# Patient Record
Sex: MALE | Race: WHITE | NOT HISPANIC OR LATINO | Employment: OTHER | URBAN - METROPOLITAN AREA
[De-identification: names, ages, dates, MRNs, and addresses within clinical notes are randomized per-mention and may not be internally consistent; named-entity substitution may affect disease eponyms.]

---

## 2017-04-26 ENCOUNTER — OFFICE VISIT (OUTPATIENT)
Dept: SURGERY | Facility: CLINIC | Age: 62
End: 2017-04-26

## 2017-04-26 VITALS — WEIGHT: 158 LBS | BODY MASS INDEX: 23.95 KG/M2 | HEIGHT: 68 IN

## 2017-04-26 DIAGNOSIS — R10.31 RIGHT LOWER QUADRANT ABDOMINAL PAIN: Primary | ICD-10-CM

## 2017-04-26 PROCEDURE — 99203 OFFICE O/P NEW LOW 30 MIN: CPT | Performed by: SURGERY

## 2017-04-26 RX ORDER — LIOTHYRONINE SODIUM 5 UG/1
5 TABLET ORAL
COMMUNITY

## 2017-04-26 RX ORDER — ACETAMINOPHEN 160 MG
2000 TABLET,DISINTEGRATING ORAL DAILY
COMMUNITY
End: 2022-08-24

## 2017-04-27 NOTE — PROGRESS NOTES
Date of Service: 4/27/2017    Chief Complaint   Patient presents with   • Hernia     [facility]       Subjective: Kenneth Jean Baptiste is a 61 y.o. male who is referred to my clinic by Gerard Miller* for Right hip pain.  The patient has chronic pain issues about one week ago he developed sudden onset of right buttock and hip pain.  The pain was dull and burning in nature and radiates to the back right leg, lower abdomen and scrotum.  The rated the pain is 9 out of 10.  He will come and go.  We will get better with passing gas and belching and food intake would make the pain worse.  There is no specific food that triggers the pain. He reports nausea but no episodes of vomiting.  He has not been eating well due to concern of worsening pain.  The pain is now resolved.  He has history of chronic constipation and is being followed by Dr. Aragon.  He also reports some intermittent perirectal pain. He is taking linzess.  He reports having one bowel movement every 1 or 2 days.  The last bowel movements were hard and small.  He reports passing gas.  He usually takes.  Soft gels and prune juice for constipation but this time he hasn't been working.  He denies any fevers and chills.  He denies any pain with bowel movements.  He had a CT scan of the abdomen on Thursday that although I don't have the images the reports show only findings consistent on file small containing fat inguinal hernia.  There is no other intra-abdominal pathology.  He has history of multiple herniated this in his back and has never had surgery by physical therapy.  His last colonoscopy was in 2013 when he was found to have diverticulosis but no polyps.  He denies any other complaint    Past Medical History:   Diagnosis Date   • Arthritis    • Diverticulitis    • Diverticulosis    • GERD (gastroesophageal reflux disease)    • Gout    • Hypertension    • Thyroid disease        Past Surgical History:   Procedure Laterality Date   • APPENDECTOMY  03/2013     Dr. Granado   • BICEPS TENDON REPAIR  08/28/2012   • INGUINAL HERNIA REPAIR Right 2000    Dr. Mckenna   • JOINT REPLACEMENT Left     hip repair 2012, replaced in 1980's   • THYROIDECTOMY, PARTIAL  06/08/2016       Current Outpatient Prescriptions on File Prior to Visit   Medication Sig Dispense Refill   • acetaminophen (TYLENOL) 325 MG tablet Take 2 tablets by mouth Every 6 (Six) Hours As Needed for mild pain (1-3).  0   • allopurinol (ZYLOPRIM) 100 MG tablet TK 1 T PO Daily  1   • ALPRAZolam (XANAX) 0.5 MG tablet Take 1 tablet by mouth At Night As Needed.     • Aspirin-Acetaminophen-Caffeine (EXCEDRIN PO) Take 1 tablet by mouth As Needed.     • lansoprazole (PREVACID) 15 MG capsule Take 20 mg by mouth Daily.     • levothyroxine (SYNTHROID, LEVOTHROID) 100 MCG tablet Take 1 tablet by mouth Daily. Wait to start until stress test has been done and is ok. 30 tablet 0   • Linaclotide 145 MCG capsule Take 1 capsule by mouth as needed.     • metoprolol succinate XL (TOPROL-XL) 25 MG 24 hr tablet Take 1 tablet by mouth Daily. 30 tablet 0   • naproxen sodium (ALEVE) 220 MG tablet Take 220 mg by mouth 2 (two) times a day as needed for mild pain (1-3).     • traMADol (ULTRAM) 50 MG tablet Take 50 mg by mouth every 6 (six) hours as needed for moderate pain (4-6).       No current facility-administered medications on file prior to visit.        Allergies   Allergen Reactions   • Sulfa Antibiotics Anaphylaxis   • Ciprofloxacin Other (See Comments)     Causes thrush   • Citalopram      PT STATED HIS HEAD HURT WHEN TAKING CELEXA    • Codeine Nausea Only   • Meperidine Nausea Only   • Sertraline Hcl      UNPLEASANT FEELING AND NAUSEA       Social History     Social History   • Marital status:      Spouse name: N/A   • Number of children: N/A   • Years of education: N/A     Occupational History   • Not on file.     Social History Main Topics   • Smoking status: Never Smoker   • Smokeless tobacco: Never Used   • Alcohol use  "Yes      Comment: 1/day   • Drug use: No   • Sexual activity: Defer     Other Topics Concern   • Not on file     Social History Narrative       Family History   Problem Relation Age of Onset   • Cancer Mother    • Stroke Father    • Heart disease Father    • Cancer Sister    • No Known Problems Daughter    • Graves' disease Son    • Cancer Maternal Aunt        REVIEW OF SYSTEMS    CONSTITUTIONAL: Reports activity change, appetite change, chills, fatigue.  Denies fever  HEENT: Reports tinnitus, denies congestion.  RESPIRATORY: Denies chronic cough or sob.  CARDIAC: Denies chest pain, palpitations, edema  GI: Reports abdominal pain, constipation, rectal pain   : Denies dysuria or hematuria.. Reports pelvic and flank pain  MUSCULOSKELETAL: Reports joint and back and neck pain  NEURO: Reports chronic headaches denies tremor  ENDOCRINE: Denies significant heat or cold intolerance or history of thyroid problems.    DERM: no rashes,lesions or discharge.     Physical Examination  Ht 68\" (172.7 cm)  Wt 158 lb (71.7 kg)  BMI 24.02 kg/m2  Body mass index is 24.02 kg/(m^2).  GENERAL:alert, well appearing, and in no distress and oriented to person, place, and time  HEENT: normochephalic, atraumatic, no scleral icterus moist mucous membranes.  NECK: Supple there is no thyromegaly or lymphadenopathy  CHEST: clear to auscultation, no wheezes, rales or rhonchi, symmetric air entry  CARDIAC: regular rate and rhythm    ABDOMEN: soft, tender at right flank and RLQ, no rebound or guarding, no peritoneal signs.  nondistended, no masses or organomegaly. Small easy reducible inguinal hernia.  The right superior gluteal area is tender to palpation just above of the takeoff of the sciatic nerve.  There is no masses or bulges.  There is no evidence of lumbar or flank hernias.  He has mild pain at his right hip with leg elevation.   EXTREMITIES: no cyanosis, clubbing or edema  NEURO: alert and oriented, normal speech, cranial nerves 2-12 " grossly intact, no focal deficits   SKIN: Moist, warm, no rashes.  Rectal exam: Perianal examination revealed no evidence of hemorrhoids.  Digital rectal examination show a tonic anal sphincter, he has an enlarged prostate without any obvious nodularities.  There is no masses in the rectal vault.  The examination is negative for blood.    Radiographic Studies:  CT A/P(4/20/17): (Report- No imaging available) Small fat containing inguinal hernia, diverticulosis without diverticulitis, splenomegaly.     Labs(4/18/17):   WBC 4.8 Hgb 5.7  Lipase, LFT normal  No other lab abnormalities    Assessment:   Kenneth Jean Baptiste is a 61 y.o. male who presents with right buttocks pain that radiates to the right lower quadrant and groin and right leg as well as lower back.  His physical exam does not show any signs of intra-abdominal pathology.  He has a small easy reducible inguinal hernia that would not cause the pain the patient is experiencing.  He has chronic right lower quadrant pain and had several studies done.  He has history of chronic constipation and has been treated by Dr. Aragon.  I do not think the patient has any signs or symptoms of bowel obstruction.  He has pain and feels nauseous after food intake that can be attributed to gallbladder disease.  I don't think his pain is related to any intra-abdominal pathology.  He has multiple hernia disease and the type of pain and distribution is more consistent with this.  I do not think the constipation will cause his right hip pain.    Plan:      - I will order a HIDA scan to rule out gallbladder dyskinesia, the next step would be to perform an upper endoscopy to rule out peptic ulcer disease.  If negative will need to have spine x-rays order.  - He has small inguinal hernia that is not the cause of his symptoms and can be fixed electively if needed after his acute issues are resolved  - Bowel regimen as per Dr. Aragon.  He can use OTC mg citrate.    I discussed my  findings with the patient.  He verbalized understanding and agree with    Zbigniew Starr MD  General, Minimally Invasive and Endoscopic Surgery  [facility]    [address], [address]  [place], [place], [address]  P: [phone]  F: [phone]

## 2017-05-03 ENCOUNTER — APPOINTMENT (OUTPATIENT)
Dept: NUCLEAR MEDICINE | Facility: HOSPITAL | Age: 62
End: 2017-05-03
Attending: SURGERY

## 2017-05-09 ENCOUNTER — OFFICE (AMBULATORY)
Dept: URBAN - METROPOLITAN AREA CLINIC 75 | Facility: CLINIC | Age: 62
End: 2017-05-09

## 2017-05-09 VITALS
HEIGHT: 68 IN | WEIGHT: 157 LBS | HEART RATE: 73 BPM | SYSTOLIC BLOOD PRESSURE: 122 MMHG | DIASTOLIC BLOOD PRESSURE: 72 MMHG

## 2017-05-09 DIAGNOSIS — K59.09 OTHER CONSTIPATION: ICD-10-CM

## 2017-05-09 DIAGNOSIS — R10.30 LOWER ABDOMINAL PAIN, UNSPECIFIED: ICD-10-CM

## 2017-05-09 DIAGNOSIS — R14.3 FLATULENCE: ICD-10-CM

## 2017-05-09 DIAGNOSIS — R19.4 CHANGE IN BOWEL HABIT: ICD-10-CM

## 2017-05-09 DIAGNOSIS — Z80.9 FAMILY HISTORY OF MALIGNANT NEOPLASM, UNSPECIFIED: ICD-10-CM

## 2017-05-09 PROCEDURE — 99214 OFFICE O/P EST MOD 30 MIN: CPT

## 2017-05-09 RX ORDER — LINACLOTIDE 290 UG/1
290 CAPSULE, GELATIN COATED ORAL
Qty: 90 | Refills: 3 | Status: COMPLETED
Start: 2017-05-09 | End: 2021-01-26

## 2017-05-09 RX ORDER — LINACLOTIDE 145 UG/1
145 CAPSULE, GELATIN COATED ORAL
Qty: 30 | Refills: 0 | Status: COMPLETED
End: 2017-05-09

## 2017-05-15 ENCOUNTER — APPOINTMENT (OUTPATIENT)
Dept: NUCLEAR MEDICINE | Facility: HOSPITAL | Age: 62
End: 2017-05-15
Attending: SURGERY

## 2017-06-20 VITALS
RESPIRATION RATE: 14 BRPM | WEIGHT: 148 LBS | HEART RATE: 65 BPM | SYSTOLIC BLOOD PRESSURE: 119 MMHG | DIASTOLIC BLOOD PRESSURE: 79 MMHG | HEART RATE: 70 BPM | HEART RATE: 72 BPM | RESPIRATION RATE: 19 BRPM | DIASTOLIC BLOOD PRESSURE: 49 MMHG | HEIGHT: 68 IN | TEMPERATURE: 97.3 F | SYSTOLIC BLOOD PRESSURE: 107 MMHG | DIASTOLIC BLOOD PRESSURE: 72 MMHG | HEART RATE: 78 BPM | RESPIRATION RATE: 16 BRPM | OXYGEN SATURATION: 100 % | DIASTOLIC BLOOD PRESSURE: 68 MMHG | SYSTOLIC BLOOD PRESSURE: 98 MMHG | RESPIRATION RATE: 18 BRPM | SYSTOLIC BLOOD PRESSURE: 116 MMHG | DIASTOLIC BLOOD PRESSURE: 64 MMHG | TEMPERATURE: 97.5 F | OXYGEN SATURATION: 97 % | DIASTOLIC BLOOD PRESSURE: 75 MMHG | SYSTOLIC BLOOD PRESSURE: 102 MMHG | HEART RATE: 76 BPM | RESPIRATION RATE: 10 BRPM | SYSTOLIC BLOOD PRESSURE: 95 MMHG | SYSTOLIC BLOOD PRESSURE: 123 MMHG | HEART RATE: 67 BPM | HEART RATE: 71 BPM | SYSTOLIC BLOOD PRESSURE: 101 MMHG | SYSTOLIC BLOOD PRESSURE: 109 MMHG | DIASTOLIC BLOOD PRESSURE: 66 MMHG | OXYGEN SATURATION: 99 % | OXYGEN SATURATION: 98 % | DIASTOLIC BLOOD PRESSURE: 77 MMHG | HEART RATE: 74 BPM

## 2017-06-21 ENCOUNTER — AMBULATORY SURGICAL CENTER (AMBULATORY)
Dept: URBAN - METROPOLITAN AREA SURGERY 17 | Facility: SURGERY | Age: 62
End: 2017-06-21

## 2017-06-21 ENCOUNTER — OFFICE (AMBULATORY)
Dept: URBAN - METROPOLITAN AREA CLINIC 64 | Facility: CLINIC | Age: 62
End: 2017-06-21

## 2017-06-21 DIAGNOSIS — D12.2 BENIGN NEOPLASM OF ASCENDING COLON: ICD-10-CM

## 2017-06-21 DIAGNOSIS — K57.30 DIVERTICULOSIS OF LARGE INTESTINE WITHOUT PERFORATION OR ABS: ICD-10-CM

## 2017-06-21 DIAGNOSIS — R10.30 LOWER ABDOMINAL PAIN, UNSPECIFIED: ICD-10-CM

## 2017-06-21 DIAGNOSIS — R19.4 CHANGE IN BOWEL HABIT: ICD-10-CM

## 2017-06-21 LAB
GI HISTOLOGY: A. UNSPECIFIED: (no result)
GI HISTOLOGY: PDF REPORT: (no result)

## 2017-06-21 PROCEDURE — 45385 COLONOSCOPY W/LESION REMOVAL: CPT | Performed by: INTERNAL MEDICINE

## 2017-06-21 PROCEDURE — 88305 TISSUE EXAM BY PATHOLOGIST: CPT | Performed by: INTERNAL MEDICINE

## 2017-06-21 RX ADMIN — PROPOFOL 50 MG: 10 INJECTION, EMULSION INTRAVENOUS at 09:59

## 2017-06-21 RX ADMIN — PROPOFOL 100 MG: 10 INJECTION, EMULSION INTRAVENOUS at 09:52

## 2017-06-21 RX ADMIN — PROPOFOL 50 MG: 10 INJECTION, EMULSION INTRAVENOUS at 09:56

## 2017-06-21 RX ADMIN — PROPOFOL 50 MG: 10 INJECTION, EMULSION INTRAVENOUS at 09:52

## 2017-06-21 NOTE — SERVICENOTES
Due hip replacement and wonders if this could contribute to abdominal pain. Gluten free diet has helped.

## 2017-06-21 NOTE — SERVICEHPINOTES
Patient presents with c/o lower abdominal pain radiating to low back, bloating, constipation x 1 month. He had been taking Linzess 145mg PRN which was working until this recent complaints. He did have a CT scan which suggested a Right Inguinal Hernia. He saw a surgeon who did not feel like this is the problem. He finally took Mg Citrate which relieved the pain, bloating, constipation. He did go gluten free about 10 days ago which has seemed to help his symptoms. He continues to have lower abdominal pain and tenderness. His pain is worsened by foods. It did not seem to correlate with any food types. Last CN was in 2013 which was normal. He is due every 5 years due to FHCC and Appendicular cancer.

## 2017-12-19 PROCEDURE — 99283 EMERGENCY DEPT VISIT LOW MDM: CPT

## 2017-12-20 ENCOUNTER — HOSPITAL ENCOUNTER (EMERGENCY)
Facility: HOSPITAL | Age: 62
Discharge: HOME | End: 2017-12-20
Attending: EMERGENCY MEDICINE | Admitting: EMERGENCY MEDICINE

## 2017-12-20 VITALS
TEMPERATURE: 98 F | DIASTOLIC BLOOD PRESSURE: 84 MMHG | BODY MASS INDEX: 22.43 KG/M2 | WEIGHT: 148 LBS | RESPIRATION RATE: 17 BRPM | HEIGHT: 68 IN | SYSTOLIC BLOOD PRESSURE: 140 MMHG | OXYGEN SATURATION: 100 % | HEART RATE: 84 BPM

## 2017-12-20 DIAGNOSIS — M25.562 ACUTE PAIN OF LEFT KNEE: Primary | ICD-10-CM

## 2017-12-20 DIAGNOSIS — M71.22 SYNOVIAL CYST OF LEFT POPLITEAL SPACE: ICD-10-CM

## 2017-12-20 LAB
ALBUMIN SERPL-MCNC: 4.5 G/DL (ref 3.5–5.2)
ALBUMIN/GLOB SERPL: 1.4 G/DL
ALP SERPL-CCNC: 65 U/L (ref 39–117)
ALT SERPL W P-5'-P-CCNC: 9 U/L (ref 1–41)
ANION GAP SERPL CALCULATED.3IONS-SCNC: 12.4 MMOL/L
AST SERPL-CCNC: 15 U/L (ref 1–40)
BASOPHILS # BLD AUTO: 0.04 10*3/MM3 (ref 0–0.2)
BASOPHILS NFR BLD AUTO: 0.6 % (ref 0–1.5)
BILIRUB SERPL-MCNC: 0.4 MG/DL (ref 0.1–1.2)
BUN BLD-MCNC: 17 MG/DL (ref 8–23)
BUN/CREAT SERPL: 15.9 (ref 7–25)
CALCIUM SPEC-SCNC: 9.3 MG/DL (ref 8.6–10.5)
CHLORIDE SERPL-SCNC: 101 MMOL/L (ref 98–107)
CO2 SERPL-SCNC: 28.6 MMOL/L (ref 22–29)
CREAT BLD-MCNC: 1.07 MG/DL (ref 0.76–1.27)
D DIMER PPP FEU-MCNC: <0.27 MCGFEU/ML (ref 0–0.49)
DEPRECATED RDW RBC AUTO: 46.3 FL (ref 37–54)
EOSINOPHIL # BLD AUTO: 0.28 10*3/MM3 (ref 0–0.7)
EOSINOPHIL NFR BLD AUTO: 4.5 % (ref 0.3–6.2)
ERYTHROCYTE [DISTWIDTH] IN BLOOD BY AUTOMATED COUNT: 13.5 % (ref 11.5–14.5)
GFR SERPL CREATININE-BSD FRML MDRD: 70 ML/MIN/1.73
GLOBULIN UR ELPH-MCNC: 3.3 GM/DL
GLUCOSE BLD-MCNC: 101 MG/DL (ref 65–99)
HCT VFR BLD AUTO: 44.7 % (ref 40.4–52.2)
HGB BLD-MCNC: 15.5 G/DL (ref 13.7–17.6)
HOLD SPECIMEN: NORMAL
HOLD SPECIMEN: NORMAL
IMM GRANULOCYTES # BLD: 0.02 10*3/MM3 (ref 0–0.03)
IMM GRANULOCYTES NFR BLD: 0.3 % (ref 0–0.5)
LYMPHOCYTES # BLD AUTO: 1.87 10*3/MM3 (ref 0.9–4.8)
LYMPHOCYTES NFR BLD AUTO: 29.7 % (ref 19.6–45.3)
MCH RBC QN AUTO: 33.1 PG (ref 27–32.7)
MCHC RBC AUTO-ENTMCNC: 34.7 G/DL (ref 32.6–36.4)
MCV RBC AUTO: 95.5 FL (ref 79.8–96.2)
MONOCYTES # BLD AUTO: 0.43 10*3/MM3 (ref 0.2–1.2)
MONOCYTES NFR BLD AUTO: 6.8 % (ref 5–12)
NEUTROPHILS # BLD AUTO: 3.65 10*3/MM3 (ref 1.9–8.1)
NEUTROPHILS NFR BLD AUTO: 58.1 % (ref 42.7–76)
PLATELET # BLD AUTO: 155 10*3/MM3 (ref 140–500)
PMV BLD AUTO: 10 FL (ref 6–12)
POTASSIUM BLD-SCNC: 3.8 MMOL/L (ref 3.5–5.2)
PROT SERPL-MCNC: 7.8 G/DL (ref 6–8.5)
RBC # BLD AUTO: 4.68 10*6/MM3 (ref 4.6–6)
SODIUM BLD-SCNC: 142 MMOL/L (ref 136–145)
WBC NRBC COR # BLD: 6.29 10*3/MM3 (ref 4.5–10.7)
WHOLE BLOOD HOLD SPECIMEN: NORMAL
WHOLE BLOOD HOLD SPECIMEN: NORMAL

## 2017-12-20 PROCEDURE — 85379 FIBRIN DEGRADATION QUANT: CPT | Performed by: NURSE PRACTITIONER

## 2017-12-20 PROCEDURE — 85025 COMPLETE CBC W/AUTO DIFF WBC: CPT | Performed by: EMERGENCY MEDICINE

## 2017-12-20 PROCEDURE — 80053 COMPREHEN METABOLIC PANEL: CPT | Performed by: EMERGENCY MEDICINE

## 2017-12-20 NOTE — DISCHARGE INSTRUCTIONS
Rest, ice, elevate and wear ace wrap for 3-5 days  Follow up with pmd/orthopedic md in 5-7 days if symptoms not improving  Return to er for increased pain/swelling, fever, chills, chest pain, shortness of air, or any new or worsening symptoms

## 2017-12-20 NOTE — ED NOTES
PT REPORTS THAT I THINK I HAVE A DVT IN MY LEFT CALF. PT REPORTS THERE IS A BUMP COMING OUT OF THE BACK OF LEFT KNEE. PT REPORTS LUMP HAS MOVED DOWN TO CALF NOW.      Arminda Corral RN  12/19/17 9938

## 2017-12-20 NOTE — ED PROVIDER NOTES
Pt with a hx of hypertension who presents complaining of L calf pain onset about two weeks ago. He denies any other symptoms at this time. Pt denies recent long distance travel or hx of blood clots.    On exam:  Musculoskeletal: tednerness to popliteal facet and gastroc; Mild crepitance with flexion and extension of L knee    Bedside US used    I reviewed pt's lab results. Suggested pt place ice to the effected area and follow up with his PCP. Will discharge. Pt understands and agrees with treatment. All questions and concerns were addressed at this time.    I supervised care provided by the midlevel provider.    We have discussed this patient's history, physical exam, and treatment plan.   I have reviewed the note and personally saw and examined the patient and agree with the plan of care.  Documentation assistance provided by netta Tena for Dr. Ocasio.  Information recorded by the scribe was done at my direction and has been verified and validated by me.       Octavio Tnea  12/20/17 8835       Reese Ocasio MD  12/20/17 3653

## 2017-12-20 NOTE — ED PROVIDER NOTES
EMERGENCY DEPARTMENT ENCOUNTER    CHIEF COMPLAINT  Chief Complaint: leg swelling   History given by: patient   History limited by: nothing   Room Number: 37/37  PMD: Gerard Miller MD      HPI:  Pt is a 62 y.o. male who presents with left calf pain and swelling for roughly 2 weeks. Patient denies fever or any other sx at this time. He recently saw his PCP regarding this issue. Pt has chronic left leg weakness prior to hip replacement years ago. Pt denies personal h/o clots but does have a family history. He denies recent travel.     Past Medical History of hypertension.     Duration: 2 weeks   Timing: constant   Location: left calf   Radiation: none   Intensity/Severity: moderate   Progression: unchanged   Associated Symptoms:none  Aggravating Factors: none specified   Alleviating Factors: none specified   Previous Episodes: none specified  Treatment before arrival: none specified     PAST MEDICAL HISTORY  Active Ambulatory Problems     Diagnosis Date Noted   • Degeneration of intervertebral disc of mid-cervical region 05/18/2016   • Left facial numbness 12/23/2016   • Migraine without aura and without status migrainosus, not intractable 12/24/2016   • Graves' disease 12/24/2016   • Postoperative hypothyroidism 12/24/2016   • Heart palpitations 12/24/2016   • Chest pain 12/24/2016   • Thrombocytopenia 12/24/2016     Resolved Ambulatory Problems     Diagnosis Date Noted   • No Resolved Ambulatory Problems     Past Medical History:   Diagnosis Date   • Arthritis    • Diverticulitis    • Diverticulosis    • GERD (gastroesophageal reflux disease)    • Gout    • Hypertension    • Thyroid disease        PAST SURGICAL HISTORY  Past Surgical History:   Procedure Laterality Date   • APPENDECTOMY  03/2013    Dr. Granado   • BICEPS TENDON REPAIR  08/28/2012   • INGUINAL HERNIA REPAIR Right 2000    Dr. Mckenna   • JOINT REPLACEMENT Left     hip repair 2012, replaced in 1980's   • THYROIDECTOMY, PARTIAL  06/08/2016        FAMILY HISTORY  Family History   Problem Relation Age of Onset   • Cancer Mother    • Stroke Father    • Heart disease Father    • Cancer Sister    • No Known Problems Daughter    • Graves' disease Son    • Cancer Maternal Aunt        SOCIAL HISTORY  Social History     Social History   • Marital status:      Spouse name: N/A   • Number of children: N/A   • Years of education: N/A     Occupational History   • Not on file.     Social History Main Topics   • Smoking status: Never Smoker   • Smokeless tobacco: Never Used   • Alcohol use Yes      Comment: 1/day   • Drug use: No   • Sexual activity: Defer     Other Topics Concern   • Not on file     Social History Narrative         ALLERGIES  Sulfa antibiotics; Ciprofloxacin; Citalopram; Codeine; Meperidine; and Sertraline hcl    REVIEW OF SYSTEMS  Review of Systems   Constitutional: Negative for chills and fever.   HENT: Negative for sore throat.    Cardiovascular: Positive for leg swelling (left).   Gastrointestinal: Negative for nausea and vomiting.   Genitourinary: Negative for dysuria.   Musculoskeletal: Negative for back pain.        Left leg pain   Skin: Negative for rash.   Psychiatric/Behavioral: The patient is not nervous/anxious.        PHYSICAL EXAM  ED Triage Vitals   Temp Heart Rate Resp BP SpO2   12/19/17 2133 12/19/17 2133 12/19/17 2133 12/19/17 2144 12/19/17 2133   98 °F (36.7 °C) 86 16 173/93 100 %       Physical Exam   Constitutional: He is well-developed, well-nourished, and in no distress. No distress.   HENT:   Head: Atraumatic.   Mouth/Throat: Mucous membranes are normal.   Eyes: No scleral icterus.   Neck: Normal range of motion.   Cardiovascular: Normal rate, regular rhythm and normal heart sounds.    Pulses:       Dorsalis pedis pulses are 2+ on the left side.        Posterior tibial pulses are 2+ on the left side.   Pulmonary/Chest: Effort normal and breath sounds normal.   Musculoskeletal: Normal range of motion.        Left lower  leg: He exhibits tenderness (proximal). He exhibits no swelling.   No warmth or erythema to left calf. Negative Homans side.    Neurological: He is alert.   Skin: Skin is warm and dry.   Psychiatric: Mood and affect normal.   Nursing note and vitals reviewed.      LAB RESULTS  Recent Results (from the past 24 hour(s))   Comprehensive Metabolic Panel    Collection Time: 12/20/17  1:46 AM   Result Value Ref Range    Glucose 101 (H) 65 - 99 mg/dL    BUN 17 8 - 23 mg/dL    Creatinine 1.07 0.76 - 1.27 mg/dL    Sodium 142 136 - 145 mmol/L    Potassium 3.8 3.5 - 5.2 mmol/L    Chloride 101 98 - 107 mmol/L    CO2 28.6 22.0 - 29.0 mmol/L    Calcium 9.3 8.6 - 10.5 mg/dL    Total Protein 7.8 6.0 - 8.5 g/dL    Albumin 4.50 3.50 - 5.20 g/dL    ALT (SGPT) 9 1 - 41 U/L    AST (SGOT) 15 1 - 40 U/L    Alkaline Phosphatase 65 39 - 117 U/L    Total Bilirubin 0.4 0.1 - 1.2 mg/dL    eGFR Non African Amer 70 >60 mL/min/1.73    Globulin 3.3 gm/dL    A/G Ratio 1.4 g/dL    BUN/Creatinine Ratio 15.9 7.0 - 25.0    Anion Gap 12.4 mmol/L   Light Blue Top    Collection Time: 12/20/17  1:46 AM   Result Value Ref Range    Extra Tube hold for add-on    Green Top (Gel)    Collection Time: 12/20/17  1:46 AM   Result Value Ref Range    Extra Tube Hold for add-ons.    Lavender Top    Collection Time: 12/20/17  1:46 AM   Result Value Ref Range    Extra Tube hold for add-on    Gold Top - SST    Collection Time: 12/20/17  1:46 AM   Result Value Ref Range    Extra Tube Hold for add-ons.    CBC Auto Differential    Collection Time: 12/20/17  1:46 AM   Result Value Ref Range    WBC 6.29 4.50 - 10.70 10*3/mm3    RBC 4.68 4.60 - 6.00 10*6/mm3    Hemoglobin 15.5 13.7 - 17.6 g/dL    Hematocrit 44.7 40.4 - 52.2 %    MCV 95.5 79.8 - 96.2 fL    MCH 33.1 (H) 27.0 - 32.7 pg    MCHC 34.7 32.6 - 36.4 g/dL    RDW 13.5 11.5 - 14.5 %    RDW-SD 46.3 37.0 - 54.0 fl    MPV 10.0 6.0 - 12.0 fL    Platelets 155 140 - 500 10*3/mm3    Neutrophil % 58.1 42.7 - 76.0 %    Lymphocyte  % 29.7 19.6 - 45.3 %    Monocyte % 6.8 5.0 - 12.0 %    Eosinophil % 4.5 0.3 - 6.2 %    Basophil % 0.6 0.0 - 1.5 %    Immature Grans % 0.3 0.0 - 0.5 %    Neutrophils, Absolute 3.65 1.90 - 8.10 10*3/mm3    Lymphocytes, Absolute 1.87 0.90 - 4.80 10*3/mm3    Monocytes, Absolute 0.43 0.20 - 1.20 10*3/mm3    Eosinophils, Absolute 0.28 0.00 - 0.70 10*3/mm3    Basophils, Absolute 0.04 0.00 - 0.20 10*3/mm3    Immature Grans, Absolute 0.02 0.00 - 0.03 10*3/mm3   D-dimer, Quantitative    Collection Time: 12/20/17  1:46 AM   Result Value Ref Range    D-Dimer, Quantitative <0.27 0.00 - 0.49 MCGFEU/mL       I ordered the above labs and reviewed the results    PROGRESS AND CONSULTS    2142: Labs were ordered in triage.     0334: Rechecked pt. Pt is resting comfortably. Informed pt that d-dimer is negative, which makes a blood clot unlikely. I suspect that pt's pain is musculoskeletal and encouraged him to f/u with his PCP if pain persists. I also encouraged pt to see his orthopedist as needed.     0340: Reviewed pt's history and workup with Dr. Ocasio.  At bedside evaluation, they agree with the plan of care.    0405: Dr. Ocasio evaluated LLE with US at bedside. Evidence for baker's cyst seen.     Reviewed implications of results, diagnosis, meds, responsibility to follow up, warning signs and symptoms of possible worsening, potential complications and reasons to return to ER with patient.  Discussed all results and noted any abnormalities with patient.  Discussed absolute need to recheck abnormalities with PCP or orthopedist.     Discussed plan for discharge, as there is no emergent indication for admission.  Pt is agreeable and understands need for follow up and repeat testing.  Pt is aware that discharge does not mean that nothing is wrong but it indicates no emergency is present.  Pt is discharged with instructions to follow up with primary care doctor to have their blood pressure rechecked.       DIAGNOSIS  Final diagnoses:  "  Acute pain of left knee   Synovial cyst of left popliteal space       FOLLOW UP   Gerard Miller MD  [address] [facility] [id number] [phone]          Ronald Stevens MD  [address] [id number] [phone]    Schedule an appointment as soon as possible for a visit      Gerard Miller MD  [address] [facility] [id number] [phone]    Call in 1 day      COURSE & MEDICAL DECISION MAKING  Pertinent Labs that were ordered and reviewed are noted above.  Results were reviewed/discussed with the patient and they were also made aware of online assess.   Pt also made aware that some labs, such as cultures, will not be resulted during ER visit and follow up with PMD is necessary.     MEDICATIONS GIVEN IN ER  Medications - No data to display    /80  Pulse 84  Temp 98 °F (36.7 °C) (Tympanic)   Resp 16  Ht 172.7 cm (68\")  Wt 67.1 kg (148 lb)  SpO2 100%  BMI 22.5 kg/m2      I personally reviewed the past medical history, past surgical history, social history, family history, current medications and allergies as they appear in this chart.  The scribe's note accurately reflects the work and decisions made by me.     Documentation assistance provided by netta Light for NAHID Freed on 12/19/2017 at 4:16 AM. Information recorded by the scribe was done at my direction and has been verified and validated by me.           Monalisa Light  12/20/17 0417       OCTAVIA Gambino  12/20/17 0448    "

## 2017-12-20 NOTE — ED NOTES
"Pt to Room 27 with c/o left sided posterior knee tenderness X4 weeks.  Pt denies trauma to the area, or long car/plane travel.  Pt states that he followed up with his PC and wad advised that \"it was apart of the knee coming through the back because of age.\"  Pt denies history of blood clots, states that his father does have a history of such.  Pt not have swelling to the left calf/knee.  Pt is alert and oriented X4, PERRLA, respirations are even and unlabored, chest rise and fall is equal in expansion.  Pt does not appear to be in distress at this time.  Pt denies fever, chills, n/v/d, blurred vision, chest pain, abdominal pain, loss in control of bowel/bladder.  Bed in low position, call light within reach, side rails up X2.      Julee Mascorro RN  12/20/17 0156    "

## 2019-07-30 ENCOUNTER — TRANSCRIBE ORDERS (OUTPATIENT)
Dept: ADMINISTRATIVE | Facility: HOSPITAL | Age: 64
End: 2019-07-30

## 2019-07-30 DIAGNOSIS — R51.9 GENERALIZED HEADACHES: Primary | ICD-10-CM

## 2019-08-12 ENCOUNTER — HOSPITAL ENCOUNTER (OUTPATIENT)
Dept: MRI IMAGING | Facility: HOSPITAL | Age: 64
Discharge: HOME | End: 2019-08-12
Admitting: INTERNAL MEDICINE

## 2019-08-12 DIAGNOSIS — R51.9 GENERALIZED HEADACHES: ICD-10-CM

## 2019-08-12 PROCEDURE — 70551 MRI BRAIN STEM W/O DYE: CPT

## 2020-03-30 ENCOUNTER — OFFICE (AMBULATORY)
Dept: URBAN - METROPOLITAN AREA TELEHEALTH 6 | Facility: TELEHEALTH | Age: 65
End: 2020-03-30

## 2020-03-30 VITALS — WEIGHT: 139 LBS | HEIGHT: 68 IN

## 2020-03-30 DIAGNOSIS — R19.4 CHANGE IN BOWEL HABIT: ICD-10-CM

## 2020-03-30 DIAGNOSIS — R10.31 RIGHT LOWER QUADRANT PAIN: ICD-10-CM

## 2020-03-30 DIAGNOSIS — K59.09 OTHER CONSTIPATION: ICD-10-CM

## 2020-03-30 DIAGNOSIS — K41.90: ICD-10-CM

## 2020-03-30 PROCEDURE — 99214 OFFICE O/P EST MOD 30 MIN: CPT | Mod: 95 | Performed by: INTERNAL MEDICINE

## 2020-03-31 RX ORDER — LINACLOTIDE 290 UG/1
290 CAPSULE, GELATIN COATED ORAL
Qty: 90 | Refills: 3 | Status: COMPLETED
Start: 2017-05-09 | End: 2021-01-26

## 2021-01-26 VITALS
HEART RATE: 80 BPM | DIASTOLIC BLOOD PRESSURE: 53 MMHG | OXYGEN SATURATION: 99 % | OXYGEN SATURATION: 97 % | SYSTOLIC BLOOD PRESSURE: 98 MMHG | SYSTOLIC BLOOD PRESSURE: 166 MMHG | SYSTOLIC BLOOD PRESSURE: 143 MMHG | HEIGHT: 68 IN | RESPIRATION RATE: 19 BRPM | HEART RATE: 78 BPM | SYSTOLIC BLOOD PRESSURE: 112 MMHG | SYSTOLIC BLOOD PRESSURE: 121 MMHG | WEIGHT: 138 LBS | RESPIRATION RATE: 13 BRPM | SYSTOLIC BLOOD PRESSURE: 118 MMHG | OXYGEN SATURATION: 100 % | DIASTOLIC BLOOD PRESSURE: 82 MMHG | TEMPERATURE: 97.1 F | RESPIRATION RATE: 12 BRPM | DIASTOLIC BLOOD PRESSURE: 47 MMHG | HEART RATE: 77 BPM | RESPIRATION RATE: 10 BRPM | DIASTOLIC BLOOD PRESSURE: 67 MMHG | TEMPERATURE: 98.2 F | RESPIRATION RATE: 15 BRPM | DIASTOLIC BLOOD PRESSURE: 69 MMHG | SYSTOLIC BLOOD PRESSURE: 110 MMHG | SYSTOLIC BLOOD PRESSURE: 119 MMHG | SYSTOLIC BLOOD PRESSURE: 100 MMHG | HEART RATE: 85 BPM | DIASTOLIC BLOOD PRESSURE: 59 MMHG | SYSTOLIC BLOOD PRESSURE: 93 MMHG | HEART RATE: 75 BPM | HEART RATE: 79 BPM | DIASTOLIC BLOOD PRESSURE: 79 MMHG | HEART RATE: 74 BPM | RESPIRATION RATE: 8 BRPM | DIASTOLIC BLOOD PRESSURE: 60 MMHG | HEART RATE: 82 BPM | RESPIRATION RATE: 16 BRPM | HEART RATE: 81 BPM | DIASTOLIC BLOOD PRESSURE: 94 MMHG | RESPIRATION RATE: 14 BRPM | HEART RATE: 76 BPM | DIASTOLIC BLOOD PRESSURE: 85 MMHG

## 2021-01-29 ENCOUNTER — AMBULATORY SURGICAL CENTER (AMBULATORY)
Dept: URBAN - METROPOLITAN AREA SURGERY 17 | Facility: SURGERY | Age: 66
End: 2021-01-29

## 2021-01-29 DIAGNOSIS — Z80.9 FAMILY HISTORY OF MALIGNANT NEOPLASM, UNSPECIFIED: ICD-10-CM

## 2021-01-29 DIAGNOSIS — Z86.010 PERSONAL HISTORY OF COLONIC POLYPS: ICD-10-CM

## 2021-01-29 DIAGNOSIS — K57.30 DIVERTICULOSIS OF LARGE INTESTINE WITHOUT PERFORATION OR ABS: ICD-10-CM

## 2021-01-29 PROCEDURE — 45378 DIAGNOSTIC COLONOSCOPY: CPT | Performed by: INTERNAL MEDICINE

## 2021-03-16 ENCOUNTER — TELEPHONE (OUTPATIENT)
Dept: NEUROSURGERY | Facility: CLINIC | Age: 66
End: 2021-03-16

## 2021-03-16 RX ORDER — OMEPRAZOLE 20 MG/1
20 CAPSULE, DELAYED RELEASE ORAL DAILY
COMMUNITY
End: 2021-09-27 | Stop reason: ALTCHOICE

## 2021-03-16 RX ORDER — LORATADINE 10 MG/1
10 TABLET ORAL DAILY
COMMUNITY
Start: 2015-04-16 | End: 2021-11-29

## 2021-03-16 RX ORDER — VITAMIN B COMPLEX
1 TABLET ORAL DAILY
COMMUNITY
Start: 2017-04-18 | End: 2021-11-29

## 2021-03-17 ENCOUNTER — OFFICE VISIT (OUTPATIENT)
Dept: NEUROSURGERY | Facility: CLINIC | Age: 66
End: 2021-03-17

## 2021-03-17 VITALS
BODY MASS INDEX: 20.83 KG/M2 | WEIGHT: 145.5 LBS | SYSTOLIC BLOOD PRESSURE: 146 MMHG | HEIGHT: 70 IN | DIASTOLIC BLOOD PRESSURE: 88 MMHG | TEMPERATURE: 98.9 F

## 2021-03-17 DIAGNOSIS — G89.29 CHRONIC BILATERAL THORACIC BACK PAIN: ICD-10-CM

## 2021-03-17 DIAGNOSIS — M54.6 CHRONIC BILATERAL THORACIC BACK PAIN: ICD-10-CM

## 2021-03-17 DIAGNOSIS — M50.122 CERVICAL DISC DISORDER AT C5-C6 LEVEL WITH RADICULOPATHY: Primary | ICD-10-CM

## 2021-03-17 DIAGNOSIS — R29.898 BILATERAL LEG WEAKNESS: ICD-10-CM

## 2021-03-17 PROCEDURE — 99204 OFFICE O/P NEW MOD 45 MIN: CPT | Performed by: NEUROLOGICAL SURGERY

## 2021-03-17 RX ORDER — MELOXICAM 15 MG/1
15 TABLET ORAL
COMMUNITY
Start: 2020-12-30 | End: 2021-09-27

## 2021-04-14 ENCOUNTER — HOSPITAL ENCOUNTER (OUTPATIENT)
Dept: MRI IMAGING | Facility: HOSPITAL | Age: 66
Discharge: HOME | End: 2021-04-14

## 2021-04-14 DIAGNOSIS — M54.6 CHRONIC BILATERAL THORACIC BACK PAIN: ICD-10-CM

## 2021-04-14 DIAGNOSIS — R29.898 BILATERAL LEG WEAKNESS: ICD-10-CM

## 2021-04-14 DIAGNOSIS — G89.29 CHRONIC BILATERAL THORACIC BACK PAIN: ICD-10-CM

## 2021-04-14 PROCEDURE — 72148 MRI LUMBAR SPINE W/O DYE: CPT

## 2021-04-14 PROCEDURE — 72146 MRI CHEST SPINE W/O DYE: CPT

## 2021-04-19 ENCOUNTER — ANESTHESIA EVENT (OUTPATIENT)
Dept: PAIN MEDICINE | Facility: HOSPITAL | Age: 66
End: 2021-04-19

## 2021-04-19 ENCOUNTER — HOSPITAL ENCOUNTER (OUTPATIENT)
Dept: GENERAL RADIOLOGY | Facility: HOSPITAL | Age: 66
Discharge: HOME | End: 2021-04-19

## 2021-04-19 ENCOUNTER — ANESTHESIA (OUTPATIENT)
Dept: PAIN MEDICINE | Facility: HOSPITAL | Age: 66
End: 2021-04-19

## 2021-04-19 ENCOUNTER — HOSPITAL ENCOUNTER (OUTPATIENT)
Dept: PAIN MEDICINE | Facility: HOSPITAL | Age: 66
Discharge: HOME | End: 2021-04-19

## 2021-04-19 VITALS
OXYGEN SATURATION: 100 % | HEART RATE: 66 BPM | RESPIRATION RATE: 16 BRPM | BODY MASS INDEX: 21.22 KG/M2 | WEIGHT: 140 LBS | SYSTOLIC BLOOD PRESSURE: 128 MMHG | HEIGHT: 68 IN | TEMPERATURE: 97.1 F | DIASTOLIC BLOOD PRESSURE: 76 MMHG

## 2021-04-19 DIAGNOSIS — M50.122 CERVICAL DISC DISORDER AT C5-C6 LEVEL WITH RADICULOPATHY: Primary | ICD-10-CM

## 2021-04-19 DIAGNOSIS — R52 PAIN: ICD-10-CM

## 2021-04-19 PROCEDURE — 77003 FLUOROGUIDE FOR SPINE INJECT: CPT

## 2021-04-19 PROCEDURE — 25010000002 DEXAMETHASONE SODIUM PHOSPHATE 10 MG/ML SOLUTION: Performed by: ANESTHESIOLOGY

## 2021-04-19 RX ORDER — LIDOCAINE HYDROCHLORIDE 10 MG/ML
1 INJECTION, SOLUTION INFILTRATION; PERINEURAL ONCE
Status: DISCONTINUED | OUTPATIENT
Start: 2021-04-19 | End: 2021-04-20 | Stop reason: HOSPADM

## 2021-04-19 RX ORDER — DEXAMETHASONE SODIUM PHOSPHATE 10 MG/ML
10 INJECTION, SOLUTION INTRAMUSCULAR; INTRAVENOUS ONCE
Status: COMPLETED | OUTPATIENT
Start: 2021-04-19 | End: 2021-04-19

## 2021-04-19 RX ORDER — FENTANYL CITRATE 50 UG/ML
100 INJECTION, SOLUTION INTRAMUSCULAR; INTRAVENOUS ONCE
Status: DISCONTINUED | OUTPATIENT
Start: 2021-04-19 | End: 2021-04-20 | Stop reason: HOSPADM

## 2021-04-19 RX ORDER — MIDAZOLAM HYDROCHLORIDE 1 MG/ML
2 INJECTION INTRAMUSCULAR; INTRAVENOUS ONCE
Status: DISCONTINUED | OUTPATIENT
Start: 2021-04-19 | End: 2021-04-20 | Stop reason: HOSPADM

## 2021-04-19 RX ADMIN — DEXAMETHASONE SODIUM PHOSPHATE 10 MG: 10 INJECTION, SOLUTION INTRAMUSCULAR; INTRAVENOUS at 08:26

## 2021-04-19 NOTE — ANESTHESIA PROCEDURE NOTES
PAIN Epidural block    Pre-sedation assessment completed: 4/19/2021 8:18 AM    Patient reassessed immediately prior to procedure    Patient location during procedure: pain clinic  Start Time: 4/19/2021 8:18 AM  Stop Time: 4/19/2021 8:28 AM  Indication:at surgeon's request and procedure for pain  Performed By  Anesthesiologist: Jackson Lucero MD  Preanesthetic Checklist  Completed: patient identified, IV checked, site marked, risks and benefits discussed, surgical consent, monitors and equipment checked, pre-op evaluation and timeout performed  Additional Notes  Dx : Post-Op Diagnosis Codes:     * Cervical disc displacement (M50.20)     * Cervical radiculopathy (M54.12)      Plan : return to clinic as needed  Prep:  Pt Position:prone (prone)  Sterile Tech:cap, gloves, mask and sterile barrier  Prep:chlorhexidine gluconate and isopropyl alcohol  Monitoring:blood pressure monitoring, continuous pulse oximetry and EKG  Procedure:Sedation: no     Approach:midline  Guidance: fluoroscopy and c arm pa and lat and loss of resistance  Location:cervical  Level:6-7 (interlaminar)  Needle Type:Food Runnertead  Needle Gauge:20  Aspiration:negative  Medications:  Preservative Free Saline:3mL  Comments:Dexamethasone 10 mg in epid  Post Assessment:  Post-procedure: bandaid.  Pt Tolerance:patient tolerated the procedure well with no apparent complications  Complications:no

## 2021-04-19 NOTE — H&P
[facility]    History and Physical    Patient Name: Kenneth Jean Baptiste  :  1955  MRN:  [id number]  Date of Admission: 2021    Subjective     Patient is a 65 y.o. male presents with chief complaint of chronic, intermitent, moderate, severe, 7/10, unknown etiology, sharp, aching, stabbing, tingling, muscle spasms in legs neck and arm: bilateral pain.  Onset of symptoms was gradual starting several years ago.  Symptoms are associated/aggravated by nothing in particular, activity, lifting, sitting, standing or straining. Symptoms improve with massage, relaxation, pain medication, lying down, TENS unit, rest and cbd oil    The following portions of the patients history were reviewed and updated as appropriate: current medications, allergies, past medical history, past surgical history, past family history, past social history and problem list                Objective     Past Medical History:   Past Medical History:   Diagnosis Date   • Arthritis    • Diverticulitis    • Diverticulosis    • GERD (gastroesophageal reflux disease)    • Gout    • Hypertension    • Thyroid disease      Past Surgical History:   Past Surgical History:   Procedure Laterality Date   • APPENDECTOMY  2013    Dr. Granado   • BICEPS TENDON REPAIR  2012   • INGUINAL HERNIA REPAIR Right     Dr. Mckenna   • JOINT REPLACEMENT Left     hip repair , replaced in    • THYROIDECTOMY, PARTIAL  2016     Family History:   Family History   Problem Relation Age of Onset   • Cancer Mother    • Stroke Father    • Heart disease Father    • Cancer Sister    • No Known Problems Daughter    • Graves' disease Son    • Cancer Maternal Aunt      Social History:   Social History     Socioeconomic History   • Marital status:      Spouse name: Not on file   • Number of children: Not on file   • Years of education: Not on file   • Highest education level: Not on file   Tobacco Use   • Smoking status: Never Smoker   •  "Smokeless tobacco: Never Used   Substance and Sexual Activity   • Alcohol use: Yes     Comment: 1/day   • Drug use: No   • Sexual activity: Defer       Vital Signs Range for the last 24 hours  Temperature: Temp:  [36.2 °C (97.1 °F)] 36.2 °C (97.1 °F)   Temp Source: Temp src: Infrared   BP: BP: (143)/(80) 143/80   Pulse: Heart Rate:  [71] 71   Respirations: Resp:  [16] 16   SPO2: SpO2:  [100 %] 100 %   O2 Amount (l/min):     O2 Devices Device (Oxygen Therapy): room air   Weight: Weight:  [63.5 kg (140 lb)] 63.5 kg (140 lb)     Flowsheet Rows      First Filed Value   Admission Height  172.7 cm (68\") Documented at 04/19/2021 0801   Admission Weight  63.5 kg (140 lb) Documented at 04/19/2021 0801          --------------------------------------------------------------------------------    Current Outpatient Medications   Medication Sig Dispense Refill   • acetaminophen (TYLENOL) 325 MG tablet Take 2 tablets by mouth Every 6 (Six) Hours As Needed for mild pain (1-3).  0   • allopurinol (ZYLOPRIM) 100 MG tablet TK 1 T PO Daily  1   • ALPRAZolam (XANAX) 0.5 MG tablet Take 1 tablet by mouth At Night As Needed.     • B Complex Vitamins (B-Complex/B-12) tablet Take 1 tablet by mouth Daily.     • Cholecalciferol (VITAMIN D3) 2000 UNITS capsule Take 2,000 Units by mouth Daily.     • lansoprazole (PREVACID) 15 MG capsule Take 20 mg by mouth Daily.     • levothyroxine (SYNTHROID, LEVOTHROID) 100 MCG tablet Take 1 tablet by mouth Daily. Wait to start until stress test has been done and is ok. 30 tablet 0   • Linaclotide 145 MCG capsule Take 1 capsule by mouth as needed.     • liothyronine (CYTOMEL) 5 MCG tablet Take 5 mcg by mouth.     • loratadine (CLARITIN) 10 MG tablet Take 10 mg by mouth Daily.     • meloxicam (MOBIC) 15 MG tablet Take 15 mg by mouth.     • naproxen sodium (ALEVE) 220 MG tablet Take 220 mg by mouth 2 (two) times a day as needed for mild pain (1-3).     • omeprazole (priLOSEC) 20 MG capsule Take 20 mg by mouth " Daily.     • traMADol (ULTRAM) 50 MG tablet Take 50 mg by mouth every 6 (six) hours as needed for moderate pain (4-6).     • Aspirin-Acetaminophen-Caffeine (EXCEDRIN PO) Take 1 tablet by mouth As Needed.     • metoprolol succinate XL (TOPROL-XL) 25 MG 24 hr tablet Take 1 tablet by mouth Daily. 30 tablet 0     Current Facility-Administered Medications   Medication Dose Route Frequency Provider Last Rate Last Admin   • dexamethasone sodium phosphate injection 10 mg  10 mg Intravenous Once Jackson Lucero MD       • fentaNYL citrate (PF) (SUBLIMAZE) injection 100 mcg  100 mcg Intravenous Once Jackson Lucero MD       • iopamidol (ISOVUE-M 200) injection 41%  2 mL Epidural Once in imaging Jackson Lucero MD       • lidocaine (XYLOCAINE) 1 % injection 1 mL  1 mL Intradermal Once Jackson Lucero MD       • midazolam (VERSED) injection 2 mg  2 mg Intravenous Once Jackson Lucero MD           --------------------------------------------------------------------------------  Assessment/Plan      Anesthesia Evaluation     Patient summary reviewed and Nursing notes reviewed         Pain impairs ability to perform ADLs: Sleeping, Working, Toileting and Exercise/Activity  Modalities previously tried to control pain with limited effectiveness within the last 4-6 weeks: Rest, Heat, OTC medications, Prescription medications and Physical therapy     Airway   Mallampati: II  Dental - normal exam     Pulmonary - negative pulmonary ROS and normal exam   Cardiovascular - normal exam    (+) hypertension,       Neuro/Psych- negative ROS and neuro exam normal  GI/Hepatic/Renal/Endo - negative ROS     Musculoskeletal (-) negative ROS and normal exam    Abdominal  - normal exam   Substance History - negative use     OB/GYN negative ob/gyn ROS         Other                 Diagnosis and Plan    Treatment Plan  ASA 2      Procedures: Cervical Epidural Steroid Injection(LIDA), With fluoroscopy,       Anesthetic plan and risks discussed with  patient.          Diagnosis     * Cervical disc displacement [M50.20]     * Cervical radiculopathy [M54.12]      CHIEF COMPLAINT:       HISTORY OF PRESENT ILLNESS:      PAST MEDICAL HISTORY:  Current Outpatient Medications on File Prior to Encounter   Medication Sig Dispense Refill   • acetaminophen (TYLENOL) 325 MG tablet Take 2 tablets by mouth Every 6 (Six) Hours As Needed for mild pain (1-3).  0   • allopurinol (ZYLOPRIM) 100 MG tablet TK 1 T PO Daily  1   • ALPRAZolam (XANAX) 0.5 MG tablet Take 1 tablet by mouth At Night As Needed.     • B Complex Vitamins (B-Complex/B-12) tablet Take 1 tablet by mouth Daily.     • Cholecalciferol (VITAMIN D3) 2000 UNITS capsule Take 2,000 Units by mouth Daily.     • lansoprazole (PREVACID) 15 MG capsule Take 20 mg by mouth Daily.     • levothyroxine (SYNTHROID, LEVOTHROID) 100 MCG tablet Take 1 tablet by mouth Daily. Wait to start until stress test has been done and is ok. 30 tablet 0   • Linaclotide 145 MCG capsule Take 1 capsule by mouth as needed.     • liothyronine (CYTOMEL) 5 MCG tablet Take 5 mcg by mouth.     • loratadine (CLARITIN) 10 MG tablet Take 10 mg by mouth Daily.     • meloxicam (MOBIC) 15 MG tablet Take 15 mg by mouth.     • naproxen sodium (ALEVE) 220 MG tablet Take 220 mg by mouth 2 (two) times a day as needed for mild pain (1-3).     • omeprazole (priLOSEC) 20 MG capsule Take 20 mg by mouth Daily.     • traMADol (ULTRAM) 50 MG tablet Take 50 mg by mouth every 6 (six) hours as needed for moderate pain (4-6).     • Aspirin-Acetaminophen-Caffeine (EXCEDRIN PO) Take 1 tablet by mouth As Needed.     • metoprolol succinate XL (TOPROL-XL) 25 MG 24 hr tablet Take 1 tablet by mouth Daily. 30 tablet 0   • [DISCONTINUED] aspirin 81 MG tablet Take 81 mg by mouth Daily.       No current facility-administered medications on file prior to encounter.       Past Medical History:   Diagnosis Date   • Arthritis    • Diverticulitis    • Diverticulosis    • GERD  "(gastroesophageal reflux disease)    • Gout    • Hypertension    • Neck pain    • Peripheral neuropathy    • Thyroid disease          SOCIAL HISTORY:  No tobacco    REVIEW OF SYSTEMS:  No hematologic infectious or constitutional symptoms    PHYSICAL EXAM:  /80 (BP Location: Left arm, Patient Position: Sitting)   Pulse 71   Temp 36.2 °C (97.1 °F) (Infrared)   Resp 16   Ht 172.7 cm (68\")   Wt 63.5 kg (140 lb)   SpO2 100%   BMI 21.29 kg/m²     Well-developed well-nourished no acute distress  Extra ocular movements intact  Mallampati class II airway  Cardiac:  Regular rate and rhythm  Lungs:  Clear to auscultation bilaterally with good effort  Alert and oriented ×3  Deep tendon reflexes normal in the bilateral bicep and tricep   5 out of 5 strength bilateral upper and lower extremities  Cervical spine without obvious deformities ecchymoses  Cervical spine nontender to palpation      DIAGNOSIS:  Post-Op Diagnosis Codes:     * Cervical disc displacement [M50.20]     * Cervical radiculopathy [M54.12]    PLAN:  1.  Cervical 6 epidural steroid injections, up to 3, spaced 1-2 weeks apart.  If pain control is acceptable after 1 or 2 injections, it was discussed with the patient that they may return for the subsequent injections if and when their pain returns.  The risks were discussed with the patient including failure of relief, worsening pain, Headache (post dural puncture headache), bleeding (epidural hematoma) and infection (epidural abscess or skin infection).  2.  Physical therapy exercises at home as prescribed by physical therapy or from the pain clinic handout (given to the patient).  Continuation of these exercises every day, or multiple times per week, even when the patient has good pain relief, was stressed to the patient as a preventative measure to decrease the frequency and severity of future pain episodes.  3.  Continue pain medicines as already prescribed.  If patient not currently taking any, it " is recommended to begin Acetaminophen 1000 mg po q 8 hours.  If other medicines containing Acetaminophen are currently prescribed, maintain daily dose at 3000 mg.    4.  If they can tolerate NSAIDS, it is recommended to take Ibuprofen 600 mg po q 6 hours for 7 days during pain exacerbations.  Alternatively, they may substitute an NSAID of their choice (e.g. Aleve).  This may be taken at the same time as Acetaminophen.  5.  Heat and ice to the affected area as tolerated for pain control.  It was discussed that heating pads can cause burns.  6.  Low impact exercise such as walking or water exercise was recommended to maintain overall health and aid in weight control.   7.  Follow up as needed for subsequent injections.  8.  Patient was counseled to abstain from tobacco products.      Target : C6-7      Time :  23    min

## 2021-05-05 ENCOUNTER — OFFICE (AMBULATORY)
Dept: URBAN - METROPOLITAN AREA CLINIC 75 | Facility: CLINIC | Age: 66
End: 2021-05-05

## 2021-05-05 VITALS
HEART RATE: 58 BPM | DIASTOLIC BLOOD PRESSURE: 74 MMHG | RESPIRATION RATE: 16 BRPM | HEIGHT: 68 IN | WEIGHT: 143 LBS | TEMPERATURE: 97.1 F | OXYGEN SATURATION: 98 % | SYSTOLIC BLOOD PRESSURE: 124 MMHG

## 2021-05-05 DIAGNOSIS — R10.31 RIGHT LOWER QUADRANT PAIN: ICD-10-CM

## 2021-05-05 DIAGNOSIS — K59.09 OTHER CONSTIPATION: ICD-10-CM

## 2021-05-05 DIAGNOSIS — K58.1 IRRITABLE BOWEL SYNDROME WITH CONSTIPATION: ICD-10-CM

## 2021-05-05 PROCEDURE — 99214 OFFICE O/P EST MOD 30 MIN: CPT | Performed by: INTERNAL MEDICINE
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
## 2021-05-19 ENCOUNTER — OFFICE VISIT (OUTPATIENT)
Dept: NEUROSURGERY | Facility: CLINIC | Age: 66
End: 2021-05-19

## 2021-05-19 VITALS
SYSTOLIC BLOOD PRESSURE: 140 MMHG | BODY MASS INDEX: 21.7 KG/M2 | WEIGHT: 143.2 LBS | HEIGHT: 68 IN | TEMPERATURE: 98.2 F | DIASTOLIC BLOOD PRESSURE: 76 MMHG

## 2021-05-19 DIAGNOSIS — M50.122 CERVICAL DISC DISORDER AT C5-C6 LEVEL WITH RADICULOPATHY: Primary | ICD-10-CM

## 2021-05-19 DIAGNOSIS — M51.36 DDD (DEGENERATIVE DISC DISEASE), LUMBAR: ICD-10-CM

## 2021-05-19 DIAGNOSIS — G89.29 CHRONIC BILATERAL THORACIC BACK PAIN: ICD-10-CM

## 2021-05-19 DIAGNOSIS — M54.6 CHRONIC BILATERAL THORACIC BACK PAIN: ICD-10-CM

## 2021-05-19 PROBLEM — M51.369 DDD (DEGENERATIVE DISC DISEASE), LUMBAR: Status: ACTIVE | Noted: 2021-05-19

## 2021-05-19 PROCEDURE — 99214 OFFICE O/P EST MOD 30 MIN: CPT | Performed by: NEUROLOGICAL SURGERY

## 2021-08-05 ENCOUNTER — OFFICE (AMBULATORY)
Dept: URBAN - METROPOLITAN AREA CLINIC 75 | Facility: CLINIC | Age: 66
End: 2021-08-05

## 2021-08-05 VITALS — DIASTOLIC BLOOD PRESSURE: 72 MMHG | SYSTOLIC BLOOD PRESSURE: 128 MMHG | HEIGHT: 68 IN | WEIGHT: 141 LBS

## 2021-08-05 DIAGNOSIS — R19.09 OTHER INTRA-ABDOMINAL AND PELVIC SWELLING, MASS AND LUMP: ICD-10-CM

## 2021-08-05 DIAGNOSIS — K58.1 IRRITABLE BOWEL SYNDROME WITH CONSTIPATION: ICD-10-CM

## 2021-08-05 PROCEDURE — 99214 OFFICE O/P EST MOD 30 MIN: CPT | Performed by: NURSE PRACTITIONER

## 2021-08-05 RX ORDER — LACTULOSE 10 G/15ML
600 SOLUTION ORAL; RECTAL
Qty: 1800 | Refills: 11 | Status: COMPLETED
Start: 2021-08-05 | End: 2021-11-18

## 2021-09-22 ENCOUNTER — TELEPHONE (OUTPATIENT)
Dept: NEUROSURGERY | Facility: CLINIC | Age: 66
End: 2021-09-22

## 2021-09-22 NOTE — TELEPHONE ENCOUNTER
I called and left message on patient’s voicemail regarding appointment on 11-29-21 @ 845 am has been changed to 12-6-21 @ 1130 am due to Dr Can will be out of the office.

## 2021-09-27 ENCOUNTER — OFFICE VISIT (OUTPATIENT)
Dept: SURGERY | Facility: CLINIC | Age: 66
End: 2021-09-27

## 2021-09-27 VITALS — HEIGHT: 68 IN | WEIGHT: 140 LBS | BODY MASS INDEX: 21.22 KG/M2

## 2021-09-27 DIAGNOSIS — K40.21 BILATERAL RECURRENT INGUINAL HERNIA WITHOUT OBSTRUCTION OR GANGRENE: Primary | ICD-10-CM

## 2021-09-27 DIAGNOSIS — K42.9 UMBILICAL HERNIA WITHOUT OBSTRUCTION AND WITHOUT GANGRENE: ICD-10-CM

## 2021-09-27 PROCEDURE — 99204 OFFICE O/P NEW MOD 45 MIN: CPT | Performed by: SURGERY

## 2021-09-27 RX ORDER — CEFAZOLIN SODIUM 2 G/100ML
2 INJECTION, SOLUTION INTRAVENOUS ONCE
Status: CANCELLED | OUTPATIENT
Start: 2021-12-01 | End: 2021-09-27

## 2021-09-27 RX ORDER — LACTULOSE 10 G/15ML
SOLUTION ORAL DAILY PRN
COMMUNITY
Start: 2021-08-06 | End: 2021-11-29

## 2021-09-27 RX ORDER — ONDANSETRON 2 MG/ML
4 INJECTION INTRAMUSCULAR; INTRAVENOUS EVERY 6 HOURS PRN
Status: CANCELLED | OUTPATIENT
Start: 2021-09-27

## 2021-09-27 RX ORDER — POLYETHYLENE GLYCOL 3350 17 G/17G
17 POWDER, FOR SOLUTION ORAL DAILY PRN
COMMUNITY
End: 2022-08-24

## 2021-09-27 NOTE — PROGRESS NOTES
Date: 2021   Patient Name: Kenneth Jean Baptiste   Medical Record #: [id number]   : 1955  Age: 66 y.o.  Sex: male      Referring MD:  Ollie Davila DO    Reason for Visit  Chief Complaint   Patient presents with   • Hernia        History of Present Illness:     Kenneth Jean Baptiste is a 66 y.o. male who presents with a bilateral masses in his inguinal region.  He is here today for second opinion regarding treatment of his hernias.  He reports having small bulge over the right groin now for several years.  He reports intermittent episodes of right lower quadrant abdominal pain as well as right groin pain.  He has not been able to reduce any bulges from the area.  He has history of appendectomy.  He has a sister that had appendectomy later in life developed pseudomyxoma peritonei.  He is concerned about the possibility of having pseudomyxoma peritonei.  The pain sometimes radiates to the back and improves with palpation of the area.  He has history of lumbar vertebral disease.  He does not have any recent weight loss.  He has history of constipation.  He takes multiple laxatives as well as stool softeners.  Has been on Linzess.  He also complains of intermittent episode of left groin pain.  He has felt a bulge over the area for the past several years.  He was diagnosed by Dr. Medina with bilateral inguinal hernia recommend he undergo inguinal hernia repair.    He also has intermittent episodes of abdominal pain located over the umbilical area without radiation.  Pain is sharp and usually self-limited.  He does not feel any bulge over the area.  He also has left upper abdominal pain that is burning in nature and radiates to his left flank and back.  He has been able to tolerate diet without any nausea or vomiting.  He has been having better bowel function now that he was started on Linzess.  He underwent multiple CT scan of the abdomen and pelvis, none of them with images available but per reports.   1 CT scan reports a recurrent right inguinal hernia.  Other scans did not mention anything about hernia    Past Medical History    Patient Active Problem List   Diagnosis   • Degeneration of intervertebral disc of mid-cervical region   • Left facial numbness   • Migraine without aura and without status migrainosus, not intractable   • Graves' disease   • Postoperative hypothyroidism   • Heart palpitations   • Chest pain   • Thrombocytopenia (CMS/HCC)   • Bilateral leg weakness   • Cervical disc disorder at C5-C6 level with radiculopathy   • Chronic bilateral thoracic back pain   • DDD (degenerative disc disease), lumbar   • Bilateral recurrent inguinal hernia without obstruction or gangrene   • Umbilical hernia without obstruction and without gangrene         Past Surgical History    Past Surgical History:   Procedure Laterality Date   • APPENDECTOMY  03/2013    Dr. Granado   • BICEPS TENDON REPAIR  08/28/2012   • COLONOSCOPY N/A 02/2021   • EPIDURAL BLOCK     • INGUINAL HERNIA REPAIR Right 2000    Dr. Mckenna   • JOINT REPLACEMENT Left     hip repair 2012, replaced in 1980's   • THYROIDECTOMY, PARTIAL  06/08/2016       Allergies    Allergies   Allergen Reactions   • Sulfa Antibiotics Anaphylaxis   • Ciprofloxacin Other (See Comments)     Causes thrush   • Citalopram      PT STATED HIS HEAD HURT WHEN TAKING CELEXA    • Codeine Nausea Only   • Meperidine Nausea Only   • Sertraline Hcl      UNPLEASANT FEELING AND NAUSEA       Medications  Current Outpatient Medications   Medication Sig Dispense Refill   • allopurinol (ZYLOPRIM) 100 MG tablet Take 300 mg by mouth Daily.  1   • ALPRAZolam (XANAX) 0.5 MG tablet Take 1 tablet by mouth At Night As Needed.     • Aspirin-Acetaminophen-Caffeine (EXCEDRIN PO) Take 1 tablet by mouth As Needed.     • B Complex Vitamins (B-Complex/B-12) tablet Take 1 tablet by mouth Daily.     • Cholecalciferol (VITAMIN D3) 2000 UNITS capsule Take 2,000 Units by mouth Daily.     • lactulose  (CHRONULAC) 10 GM/15ML solution Daily As Needed.     • lansoprazole (PREVACID) 15 MG capsule Take 20 mg by mouth Daily.     • levothyroxine (SYNTHROID, LEVOTHROID) 100 MCG tablet Take 1 tablet by mouth Daily. Wait to start until stress test has been done and is ok. 30 tablet 0   • Linaclotide 145 MCG capsule Take 1 capsule by mouth as needed.     • liothyronine (CYTOMEL) 5 MCG tablet Take 5 mcg by mouth.     • loratadine (CLARITIN) 10 MG tablet Take 10 mg by mouth Daily.     • naproxen sodium (ALEVE) 220 MG tablet Take 220 mg by mouth 2 (two) times a day as needed for mild pain (1-3).     • polyethylene glycol (MIRALAX) 17 g packet Take 17 g by mouth Daily.     • traMADol (ULTRAM) 50 MG tablet Take 50 mg by mouth every 6 (six) hours as needed for moderate pain (4-6).       No current facility-administered medications for this visit.         Social History  Social History     Socioeconomic History   • Marital status:      Spouse name: Not on file   • Number of children: Not on file   • Years of education: Not on file   • Highest education level: Not on file   Tobacco Use   • Smoking status: Never Smoker   • Smokeless tobacco: Never Used   Vaping Use   • Vaping Use: Never used   Substance and Sexual Activity   • Alcohol use: Yes     Alcohol/week: 7.0 standard drinks     Types: 7 Standard drinks or equivalent per week     Comment: 1/day   • Drug use: No   • Sexual activity: Yes     Partners: Female     Birth control/protection: None       Family History  Family History   Problem Relation Age of Onset   • Cancer Mother    • Stroke Father    • Heart disease Father    • Cancer Sister    • No Known Problems Daughter    • Graves' disease Son    • Cancer Maternal Aunt          Review of Systems      CONSTITUTIONAL: Denies fevers, chills, unintentional weight loss or weight gain  RESPIRATORY: Denies chronic cough or sob.   CARDIAC: Denies chest pain, palpitations, edema  GI: Denies dyspepsia, reflux, heartburn, nausea,  "vomiting, diarrhea but reports constipation  : Denies dysuria or hematuria.  MUSCULOSKELETAL: Denies muscle weakness and pain   NEURO: Denies chronic headaches.   ENDOCRINE: Denies significant heat or cold intolerance or history of thyroid problems.   DERM: no rashes,lesions or discharge.     Physical Exam  Ht 172.7 cm (68\")   Wt 63.5 kg (140 lb)   BMI 21.29 kg/m²   Body mass index is 21.29 kg/m².  General: Alert oriented x3, no acute distress  HEENT: EOMI, conjunctiva clear, moist mucus membranes.  Lungs: clear to auscultation and percussion, no chest deformities noted.  Cardiovascular:  Regular rate and rhythm  Extremities: Without clubbing cyanosis or edema  Musculoskeletal: Without acute abnormality  Skin:  No rashes or hematomas.  Neuro: Gait normal. Sensation and strength grossly normal.  Abdominal exam: soft, mildly tender right lower quadrant, no rebound or guarding, no distention, no peritoneal irritation.  Small and easily reducible right inguinal hernia, moderate size and is reducible left inguinal hernia.  Mild tenderness over the area.  Small easily reducible umbilical hernia.  Well-healed laparoscopic incisions    Medical Decision Making  Test Results:    Results for orders placed or performed during the hospital encounter of 12/20/17   Comprehensive Metabolic Panel    Specimen: Blood   Result Value Ref Range    Glucose 101 (H) 65 - 99 mg/dL    BUN 17 8 - 23 mg/dL    Creatinine 1.07 0.76 - 1.27 mg/dL    Sodium 142 136 - 145 mmol/L    Potassium 3.8 3.5 - 5.2 mmol/L    Chloride 101 98 - 107 mmol/L    CO2 28.6 22.0 - 29.0 mmol/L    Calcium 9.3 8.6 - 10.5 mg/dL    Total Protein 7.8 6.0 - 8.5 g/dL    Albumin 4.50 3.50 - 5.20 g/dL    ALT (SGPT) 9 1 - 41 U/L    AST (SGOT) 15 1 - 40 U/L    Alkaline Phosphatase 65 39 - 117 U/L    Total Bilirubin 0.4 0.1 - 1.2 mg/dL    eGFR Non African Amer 70 >60 mL/min/1.73    Globulin 3.3 gm/dL    A/G Ratio 1.4 g/dL    BUN/Creatinine Ratio 15.9 7.0 - 25.0    Anion Gap " 12.4 mmol/L   CBC Auto Differential    Specimen: Blood   Result Value Ref Range    WBC 6.29 4.50 - 10.70 10*3/mm3    RBC 4.68 4.60 - 6.00 10*6/mm3    Hemoglobin 15.5 13.7 - 17.6 g/dL    Hematocrit 44.7 40.4 - 52.2 %    MCV 95.5 79.8 - 96.2 fL    MCH 33.1 (H) 27.0 - 32.7 pg    MCHC 34.7 32.6 - 36.4 g/dL    RDW 13.5 11.5 - 14.5 %    RDW-SD 46.3 37.0 - 54.0 fl    MPV 10.0 6.0 - 12.0 fL    Platelets 155 140 - 500 10*3/mm3    Neutrophil % 58.1 42.7 - 76.0 %    Lymphocyte % 29.7 19.6 - 45.3 %    Monocyte % 6.8 5.0 - 12.0 %    Eosinophil % 4.5 0.3 - 6.2 %    Basophil % 0.6 0.0 - 1.5 %    Immature Grans % 0.3 0.0 - 0.5 %    Neutrophils, Absolute 3.65 1.90 - 8.10 10*3/mm3    Lymphocytes, Absolute 1.87 0.90 - 4.80 10*3/mm3    Monocytes, Absolute 0.43 0.20 - 1.20 10*3/mm3    Eosinophils, Absolute 0.28 0.00 - 0.70 10*3/mm3    Basophils, Absolute 0.04 0.00 - 0.20 10*3/mm3    Immature Grans, Absolute 0.02 0.00 - 0.03 10*3/mm3   D-dimer, Quantitative    Specimen: Blood   Result Value Ref Range    D-Dimer, Quantitative <0.27 0.00 - 0.49 MCGFEU/mL   Light Blue Top   Result Value Ref Range    Extra Tube hold for add-on    Green Top (Gel)   Result Value Ref Range    Extra Tube Hold for add-ons.    Lavender Top   Result Value Ref Range    Extra Tube hold for add-on    Gold Top - SST   Result Value Ref Range    Extra Tube Hold for add-ons.      CT scan abdomen pelvis with IV and oral contrast 2/23/2021 (only report available): No acute CT findings, colonic diverticulosis, prostamegaly.    CT scan abdomen pelvis 4/8/2020: Enlarged prostate, liver lesions consistent with hemangiomas, postsurgical changes from prior right inguinal hernia repair with small fat-containing hernia in the inguinal canal.    Colonoscopy report 1/29/2021: Nonbleeding diverticula, normal terminal ileum      Impression:  This is a 66 y.o. male patient with recurrent reducible right inguinal hernia, left inguinal hernia, reducible umbilical hernia.  Discussed with  him about treatment options including conservative management versus surgical intervention.  At this point he is minimally symptomatic and does not need to have this urgently fix.  Discussed with him about possibility of conservative management with watchful waiting for worsening symptoms or hernia enlargement versus surgical repair of the hernia.  Risk and benefits of both approaches including the risk of incarceration strangulation and the need for emergency operation with conservative management versus risk and benefits of surgical intervention including bleeding, infection, mesh infection, chronic pain, intra-abdominal injuries as well as the risk of hernia recurrence were discussed in detail with the patient.  I offer him robotic assisted laparoscopic bilateral inguinal hernia repair with mesh placement and umbilical hernia repair with mesh.  Discussed with him that due to the recurrent nature of the right inguinal hernia that was done laparoscopically in the past he may need to have open right inguinal hernia and robotic left inguinal hernia repair.  I will try to repair his umbilical hernia robotically but if not possible then I would proceed to repair it with mesh in an open fashion.  He understand that his constipation most likely worsen after surgery due to narcotic pain medication use and the risk of urinary retention that is higher in his case due to enlarged prostate.  I discussed with him about the need to start Flomax preop to try to prevent urinary retention postop.  He is allergic to sulfa and had cardiac arrest and although cross-reaction with Flomax is very rare and hesitant to start him on Flomax.  I will discuss with urology service about different options and get back to the patient.    Of note: I discussed with him that to me is unlikely that he will  have pseudomyxoma peritonei, there is a extremely rare disease and none of the imaging suggest evidence of this.  I will take a detailed look of  the abdomen during surgery    Plan:  Surgical scheduling process is initiated.      Orders:   Orders Placed This Encounter   Procedures   • Follow Anesthesia Guidelines / Protocol     Standing Status:   Future   • Obtain Informed Consent     Standing Status:   Future     Order Specific Question:   Informed Consent Given For     Answer:   Robotic assisted laparoscopic bilateral inguinal hernia repair with mesh placement and umbilical hernia repair with mesh   • Provide NPO Instructions to Patient     Standing Status:   Future   • Chlorhexidine Skin Prep     Chlorhexidine Skin Prep and Instructions For All Patients Having A Procedure Requiring an Outward Incision if Not Allergic. If Allergic, Give Antibacterial Skin Wipes and Instructions. Do Not Use For Facial Cases or on Any Mucus Membranes.     Standing Status:   Future       Zbigniew Starr MD  General, Minimally Invasive and Endoscopic Surgery  [facility]    [address], [address]  [place], [place], [address]  P: [phone]  F: [phone]

## 2021-11-18 ENCOUNTER — OFFICE (AMBULATORY)
Dept: URBAN - METROPOLITAN AREA CLINIC 75 | Facility: CLINIC | Age: 66
End: 2021-11-18

## 2021-11-18 VITALS
DIASTOLIC BLOOD PRESSURE: 72 MMHG | HEART RATE: 79 BPM | SYSTOLIC BLOOD PRESSURE: 114 MMHG | OXYGEN SATURATION: 98 % | WEIGHT: 137 LBS | HEIGHT: 68 IN

## 2021-11-18 DIAGNOSIS — R10.31 RIGHT LOWER QUADRANT PAIN: ICD-10-CM

## 2021-11-18 DIAGNOSIS — R10.11 RIGHT UPPER QUADRANT PAIN: ICD-10-CM

## 2021-11-18 DIAGNOSIS — K59.00 CONSTIPATION, UNSPECIFIED: ICD-10-CM

## 2021-11-18 PROCEDURE — 99214 OFFICE O/P EST MOD 30 MIN: CPT | Performed by: NURSE PRACTITIONER

## 2021-11-18 RX ORDER — LINACLOTIDE 72 UG/1
72 CAPSULE, GELATIN COATED ORAL
Qty: 90 | Refills: 3 | Status: COMPLETED
End: 2022-01-27

## 2021-11-29 ENCOUNTER — PRE-ADMISSION TESTING (OUTPATIENT)
Dept: PREADMISSION TESTING | Facility: HOSPITAL | Age: 66
End: 2021-11-29

## 2021-11-29 VITALS
HEIGHT: 68 IN | TEMPERATURE: 98.1 F | WEIGHT: 141.3 LBS | SYSTOLIC BLOOD PRESSURE: 128 MMHG | HEART RATE: 73 BPM | BODY MASS INDEX: 21.41 KG/M2 | RESPIRATION RATE: 16 BRPM | OXYGEN SATURATION: 100 % | DIASTOLIC BLOOD PRESSURE: 75 MMHG

## 2021-11-29 LAB
ANION GAP SERPL CALCULATED.3IONS-SCNC: 6.3 MMOL/L (ref 5–15)
BUN SERPL-MCNC: 13 MG/DL (ref 8–23)
BUN/CREAT SERPL: 15.1 (ref 7–25)
CALCIUM SPEC-SCNC: 9.3 MG/DL (ref 8.6–10.5)
CHLORIDE SERPL-SCNC: 105 MMOL/L (ref 98–107)
CO2 SERPL-SCNC: 28.7 MMOL/L (ref 22–29)
CREAT SERPL-MCNC: 0.86 MG/DL (ref 0.76–1.27)
DEPRECATED RDW RBC AUTO: 44.1 FL (ref 37–54)
ERYTHROCYTE [DISTWIDTH] IN BLOOD BY AUTOMATED COUNT: 12.9 % (ref 12.3–15.4)
GFR SERPL CREATININE-BSD FRML MDRD: 89 ML/MIN/1.73
GLUCOSE SERPL-MCNC: 100 MG/DL (ref 65–99)
HCT VFR BLD AUTO: 42.7 % (ref 37.5–51)
HGB BLD-MCNC: 14.9 G/DL (ref 13–17.7)
MCH RBC QN AUTO: 32.5 PG (ref 26.6–33)
MCHC RBC AUTO-ENTMCNC: 34.9 G/DL (ref 31.5–35.7)
MCV RBC AUTO: 93 FL (ref 79–97)
PLATELET # BLD AUTO: 144 10*3/MM3 (ref 140–450)
PMV BLD AUTO: 10.2 FL (ref 6–12)
POTASSIUM SERPL-SCNC: 4.2 MMOL/L (ref 3.5–5.2)
QT INTERVAL: 434 MS
RBC # BLD AUTO: 4.59 10*6/MM3 (ref 4.14–5.8)
SARS-COV-2 ORF1AB RESP QL NAA+PROBE: NOT DETECTED
SODIUM SERPL-SCNC: 140 MMOL/L (ref 136–145)
WBC NRBC COR # BLD: 5.79 10*3/MM3 (ref 3.4–10.8)

## 2021-11-29 PROCEDURE — C9803 HOPD COVID-19 SPEC COLLECT: HCPCS

## 2021-11-29 PROCEDURE — 93010 ELECTROCARDIOGRAM REPORT: CPT | Performed by: INTERNAL MEDICINE

## 2021-11-29 PROCEDURE — U0004 COV-19 TEST NON-CDC HGH THRU: HCPCS

## 2021-11-29 PROCEDURE — U0005 INFEC AGEN DETEC AMPLI PROBE: HCPCS

## 2021-11-29 PROCEDURE — 93005 ELECTROCARDIOGRAM TRACING: CPT

## 2021-11-29 PROCEDURE — 80048 BASIC METABOLIC PNL TOTAL CA: CPT

## 2021-11-29 PROCEDURE — 85027 COMPLETE CBC AUTOMATED: CPT

## 2021-11-29 PROCEDURE — 36415 COLL VENOUS BLD VENIPUNCTURE: CPT

## 2021-11-29 RX ORDER — ALLOPURINOL 300 MG/1
300 TABLET ORAL DAILY
COMMUNITY
Start: 2021-11-01

## 2021-12-01 ENCOUNTER — ANESTHESIA (OUTPATIENT)
Dept: PERIOP | Facility: HOSPITAL | Age: 66
End: 2021-12-01

## 2021-12-01 ENCOUNTER — HOSPITAL ENCOUNTER (OUTPATIENT)
Facility: HOSPITAL | Age: 66
Setting detail: HOSPITAL OUTPATIENT SURGERY
Discharge: HOME | End: 2021-12-01
Attending: SURGERY | Admitting: SURGERY

## 2021-12-01 ENCOUNTER — ANESTHESIA EVENT (OUTPATIENT)
Dept: PERIOP | Facility: HOSPITAL | Age: 66
End: 2021-12-01

## 2021-12-01 VITALS
WEIGHT: 139.33 LBS | OXYGEN SATURATION: 98 % | RESPIRATION RATE: 18 BRPM | SYSTOLIC BLOOD PRESSURE: 142 MMHG | DIASTOLIC BLOOD PRESSURE: 87 MMHG | TEMPERATURE: 97.8 F | HEART RATE: 76 BPM | BODY MASS INDEX: 21.12 KG/M2 | HEIGHT: 68 IN

## 2021-12-01 DIAGNOSIS — K40.21 BILATERAL RECURRENT INGUINAL HERNIA WITHOUT OBSTRUCTION OR GANGRENE: ICD-10-CM

## 2021-12-01 DIAGNOSIS — K42.9 UMBILICAL HERNIA WITHOUT OBSTRUCTION AND WITHOUT GANGRENE: ICD-10-CM

## 2021-12-01 DIAGNOSIS — R33.9 URINARY RETENTION: Primary | ICD-10-CM

## 2021-12-01 PROCEDURE — C1781 MESH (IMPLANTABLE): HCPCS | Performed by: SURGERY

## 2021-12-01 PROCEDURE — 0 CEFAZOLIN IN DEXTROSE 2-4 GM/100ML-% SOLUTION: Performed by: SURGERY

## 2021-12-01 PROCEDURE — 49652 PR LAP, VENTRAL HERNIA REPAIR,REDUCIBLE: CPT | Performed by: SURGERY

## 2021-12-01 PROCEDURE — 25010000002 ONDANSETRON PER 1 MG: Performed by: NURSE ANESTHETIST, CERTIFIED REGISTERED

## 2021-12-01 PROCEDURE — 25010000002 HYDROMORPHONE PER 4 MG: Performed by: NURSE ANESTHETIST, CERTIFIED REGISTERED

## 2021-12-01 PROCEDURE — 49651 LAP ING HERNIA REPAIR RECUR: CPT | Performed by: SURGERY

## 2021-12-01 PROCEDURE — 25010000002 KETOROLAC TROMETHAMINE PER 15 MG: Performed by: NURSE ANESTHETIST, CERTIFIED REGISTERED

## 2021-12-01 PROCEDURE — C1889 IMPLANT/INSERT DEVICE, NOC: HCPCS | Performed by: SURGERY

## 2021-12-01 PROCEDURE — 25010000002 DROPERIDOL PER 5 MG: Performed by: NURSE ANESTHETIST, CERTIFIED REGISTERED

## 2021-12-01 PROCEDURE — 49650 LAP ING HERNIA REPAIR INIT: CPT | Performed by: SURGERY

## 2021-12-01 PROCEDURE — 25010000002 FENTANYL CITRATE (PF) 50 MCG/ML SOLUTION: Performed by: NURSE ANESTHETIST, CERTIFIED REGISTERED

## 2021-12-01 PROCEDURE — 25010000002 PROPOFOL 10 MG/ML EMULSION: Performed by: NURSE ANESTHETIST, CERTIFIED REGISTERED

## 2021-12-01 PROCEDURE — S0260 H&P FOR SURGERY: HCPCS | Performed by: SURGERY

## 2021-12-01 PROCEDURE — S2900 ROBOTIC SURGICAL SYSTEM: HCPCS | Performed by: SURGERY

## 2021-12-01 PROCEDURE — 25010000002 DEXAMETHASONE PER 1 MG: Performed by: NURSE ANESTHETIST, CERTIFIED REGISTERED

## 2021-12-01 PROCEDURE — P9612 CATHETERIZE FOR URINE SPEC: HCPCS

## 2021-12-01 PROCEDURE — 25010000002 MIDAZOLAM PER 1 MG: Performed by: ANESTHESIOLOGY

## 2021-12-01 PROCEDURE — 25010000002 NEOSTIGMINE 5 MG/10ML SOLUTION: Performed by: NURSE ANESTHETIST, CERTIFIED REGISTERED

## 2021-12-01 DEVICE — SEAL HEMO SURG ARISTA/AH ABS/PWDR 3GM: Type: IMPLANTABLE DEVICE | Site: ABDOMEN | Status: FUNCTIONAL

## 2021-12-01 DEVICE — BARD 3DMAX MESH RIGHT LARGE
Type: IMPLANTABLE DEVICE | Site: ABDOMEN | Status: FUNCTIONAL
Brand: BARD 3DMAX MESH

## 2021-12-01 DEVICE — DEV WND/CLS CONTRL TISS STRATAFIX SPIRAL MNCRYL SH 2/0 20CM: Type: IMPLANTABLE DEVICE | Site: ABDOMEN | Status: FUNCTIONAL

## 2021-12-01 DEVICE — BARD 3DMAX MESH LEFT LARGE
Type: IMPLANTABLE DEVICE | Site: ABDOMEN | Status: FUNCTIONAL
Brand: BARD 3DMAX MESH

## 2021-12-01 RX ORDER — FENTANYL CITRATE 50 UG/ML
INJECTION, SOLUTION INTRAMUSCULAR; INTRAVENOUS AS NEEDED
Status: DISCONTINUED | OUTPATIENT
Start: 2021-12-01 | End: 2021-12-01 | Stop reason: SURG

## 2021-12-01 RX ORDER — IBUPROFEN 600 MG/1
600 TABLET ORAL ONCE AS NEEDED
Status: COMPLETED | OUTPATIENT
Start: 2021-12-01 | End: 2021-12-01

## 2021-12-01 RX ORDER — PROMETHAZINE HYDROCHLORIDE 12.5 MG/1
12.5 TABLET ORAL ONCE AS NEEDED
Status: DISCONTINUED | OUTPATIENT
Start: 2021-12-01 | End: 2021-12-01 | Stop reason: HOSPADM

## 2021-12-01 RX ORDER — NALOXONE HCL 0.4 MG/ML
0.2 VIAL (ML) INJECTION AS NEEDED
Status: DISCONTINUED | OUTPATIENT
Start: 2021-12-01 | End: 2021-12-01 | Stop reason: HOSPADM

## 2021-12-01 RX ORDER — FENTANYL CITRATE 50 UG/ML
50 INJECTION, SOLUTION INTRAMUSCULAR; INTRAVENOUS
Status: DISCONTINUED | OUTPATIENT
Start: 2021-12-01 | End: 2021-12-01 | Stop reason: HOSPADM

## 2021-12-01 RX ORDER — KETOROLAC TROMETHAMINE 30 MG/ML
INJECTION, SOLUTION INTRAMUSCULAR; INTRAVENOUS AS NEEDED
Status: DISCONTINUED | OUTPATIENT
Start: 2021-12-01 | End: 2021-12-01 | Stop reason: SURG

## 2021-12-01 RX ORDER — OXYCODONE AND ACETAMINOPHEN 7.5; 325 MG/1; MG/1
2 TABLET ORAL EVERY 4 HOURS PRN
Status: DISCONTINUED | OUTPATIENT
Start: 2021-12-01 | End: 2021-12-01 | Stop reason: HOSPADM

## 2021-12-01 RX ORDER — MIDAZOLAM HYDROCHLORIDE 1 MG/ML
0.5 INJECTION INTRAMUSCULAR; INTRAVENOUS
Status: DISCONTINUED | OUTPATIENT
Start: 2021-12-01 | End: 2021-12-01 | Stop reason: HOSPADM

## 2021-12-01 RX ORDER — OXYCODONE HYDROCHLORIDE AND ACETAMINOPHEN 5; 325 MG/1; MG/1
1 TABLET ORAL ONCE AS NEEDED
Status: DISCONTINUED | OUTPATIENT
Start: 2021-12-01 | End: 2021-12-01 | Stop reason: HOSPADM

## 2021-12-01 RX ORDER — ONDANSETRON 2 MG/ML
INJECTION INTRAMUSCULAR; INTRAVENOUS AS NEEDED
Status: DISCONTINUED | OUTPATIENT
Start: 2021-12-01 | End: 2021-12-01 | Stop reason: SURG

## 2021-12-01 RX ORDER — FINASTERIDE 5 MG/1
5 TABLET, FILM COATED ORAL DAILY
Qty: 30 TABLET | Refills: 0 | Status: SHIPPED | OUTPATIENT
Start: 2021-12-01 | End: 2021-12-14 | Stop reason: ALTCHOICE

## 2021-12-01 RX ORDER — PROMETHAZINE HYDROCHLORIDE 25 MG/1
25 TABLET ORAL ONCE AS NEEDED
Status: DISCONTINUED | OUTPATIENT
Start: 2021-12-01 | End: 2021-12-01 | Stop reason: HOSPADM

## 2021-12-01 RX ORDER — DOCUSATE SODIUM 100 MG/1
100 CAPSULE, LIQUID FILLED ORAL 2 TIMES DAILY PRN
Status: DISCONTINUED | OUTPATIENT
Start: 2021-12-01 | End: 2021-12-01 | Stop reason: HOSPADM

## 2021-12-01 RX ORDER — SODIUM CHLORIDE, SODIUM LACTATE, POTASSIUM CHLORIDE, CALCIUM CHLORIDE 600; 310; 30; 20 MG/100ML; MG/100ML; MG/100ML; MG/100ML
9 INJECTION, SOLUTION INTRAVENOUS CONTINUOUS
Status: DISCONTINUED | OUTPATIENT
Start: 2021-12-01 | End: 2021-12-01 | Stop reason: HOSPADM

## 2021-12-01 RX ORDER — PROMETHAZINE HYDROCHLORIDE 25 MG/1
25 SUPPOSITORY RECTAL ONCE AS NEEDED
Status: DISCONTINUED | OUTPATIENT
Start: 2021-12-01 | End: 2021-12-01 | Stop reason: HOSPADM

## 2021-12-01 RX ORDER — ROCURONIUM BROMIDE 10 MG/ML
INJECTION, SOLUTION INTRAVENOUS AS NEEDED
Status: DISCONTINUED | OUTPATIENT
Start: 2021-12-01 | End: 2021-12-01 | Stop reason: SURG

## 2021-12-01 RX ORDER — FLUMAZENIL 0.1 MG/ML
0.2 INJECTION INTRAVENOUS AS NEEDED
Status: DISCONTINUED | OUTPATIENT
Start: 2021-12-01 | End: 2021-12-01 | Stop reason: HOSPADM

## 2021-12-01 RX ORDER — GLYCOPYRROLATE 0.2 MG/ML
INJECTION INTRAMUSCULAR; INTRAVENOUS AS NEEDED
Status: DISCONTINUED | OUTPATIENT
Start: 2021-12-01 | End: 2021-12-01 | Stop reason: SURG

## 2021-12-01 RX ORDER — OXYCODONE HYDROCHLORIDE AND ACETAMINOPHEN 5; 325 MG/1; MG/1
1 TABLET ORAL EVERY 4 HOURS PRN
Qty: 30 TABLET | Refills: 0 | Status: SHIPPED | OUTPATIENT
Start: 2021-12-01 | End: 2021-12-14

## 2021-12-01 RX ORDER — LABETALOL HYDROCHLORIDE 5 MG/ML
5 INJECTION, SOLUTION INTRAVENOUS
Status: DISCONTINUED | OUTPATIENT
Start: 2021-12-01 | End: 2021-12-01 | Stop reason: HOSPADM

## 2021-12-01 RX ORDER — FAMOTIDINE 10 MG/ML
20 INJECTION, SOLUTION INTRAVENOUS ONCE
Status: COMPLETED | OUTPATIENT
Start: 2021-12-01 | End: 2021-12-01

## 2021-12-01 RX ORDER — BUPIVACAINE HYDROCHLORIDE AND EPINEPHRINE 5; 5 MG/ML; UG/ML
INJECTION, SOLUTION PERINEURAL AS NEEDED
Status: DISCONTINUED | OUTPATIENT
Start: 2021-12-01 | End: 2021-12-01 | Stop reason: HOSPADM

## 2021-12-01 RX ORDER — LIDOCAINE HYDROCHLORIDE 10 MG/ML
0.5 INJECTION, SOLUTION EPIDURAL; INFILTRATION; INTRACAUDAL; PERINEURAL ONCE AS NEEDED
Status: DISCONTINUED | OUTPATIENT
Start: 2021-12-01 | End: 2021-12-01 | Stop reason: HOSPADM

## 2021-12-01 RX ORDER — ONDANSETRON HYDROCHLORIDE 8 MG/1
4 TABLET, FILM COATED ORAL EVERY 8 HOURS PRN
Qty: 15 TABLET | Refills: 1 | Status: SHIPPED | OUTPATIENT
Start: 2021-12-01 | End: 2021-12-14

## 2021-12-01 RX ORDER — SODIUM CHLORIDE 0.9 % (FLUSH) 0.9 %
3 SYRINGE (ML) INJECTION EVERY 12 HOURS SCHEDULED
Status: DISCONTINUED | OUTPATIENT
Start: 2021-12-01 | End: 2021-12-01 | Stop reason: HOSPADM

## 2021-12-01 RX ORDER — SODIUM CHLORIDE 9 MG/ML
INJECTION, SOLUTION INTRAVENOUS AS NEEDED
Status: DISCONTINUED | OUTPATIENT
Start: 2021-12-01 | End: 2021-12-01 | Stop reason: HOSPADM

## 2021-12-01 RX ORDER — ONDANSETRON 2 MG/ML
4 INJECTION INTRAMUSCULAR; INTRAVENOUS EVERY 6 HOURS PRN
Status: DISCONTINUED | OUTPATIENT
Start: 2021-12-01 | End: 2021-12-01 | Stop reason: HOSPADM

## 2021-12-01 RX ORDER — DIPHENHYDRAMINE HYDROCHLORIDE 50 MG/ML
12.5 INJECTION INTRAMUSCULAR; INTRAVENOUS
Status: DISCONTINUED | OUTPATIENT
Start: 2021-12-01 | End: 2021-12-01 | Stop reason: HOSPADM

## 2021-12-01 RX ORDER — SCOLOPAMINE TRANSDERMAL SYSTEM 1 MG/1
1 PATCH, EXTENDED RELEASE TRANSDERMAL
Status: DISCONTINUED | OUTPATIENT
Start: 2021-12-01 | End: 2021-12-01

## 2021-12-01 RX ORDER — HYDROCODONE BITARTRATE AND ACETAMINOPHEN 7.5; 325 MG/1; MG/1
1 TABLET ORAL ONCE AS NEEDED
Status: DISCONTINUED | OUTPATIENT
Start: 2021-12-01 | End: 2021-12-01 | Stop reason: HOSPADM

## 2021-12-01 RX ORDER — SODIUM CHLORIDE 0.9 % (FLUSH) 0.9 %
3-10 SYRINGE (ML) INJECTION AS NEEDED
Status: DISCONTINUED | OUTPATIENT
Start: 2021-12-01 | End: 2021-12-01 | Stop reason: HOSPADM

## 2021-12-01 RX ORDER — HYDROMORPHONE HYDROCHLORIDE 1 MG/ML
0.5 INJECTION, SOLUTION INTRAMUSCULAR; INTRAVENOUS; SUBCUTANEOUS
Status: DISCONTINUED | OUTPATIENT
Start: 2021-12-01 | End: 2021-12-01 | Stop reason: HOSPADM

## 2021-12-01 RX ORDER — PROPOFOL 10 MG/ML
VIAL (ML) INTRAVENOUS AS NEEDED
Status: DISCONTINUED | OUTPATIENT
Start: 2021-12-01 | End: 2021-12-01 | Stop reason: SURG

## 2021-12-01 RX ORDER — MAGNESIUM HYDROXIDE 1200 MG/15ML
LIQUID ORAL AS NEEDED
Status: DISCONTINUED | OUTPATIENT
Start: 2021-12-01 | End: 2021-12-01 | Stop reason: HOSPADM

## 2021-12-01 RX ORDER — DEXAMETHASONE SODIUM PHOSPHATE 10 MG/ML
INJECTION INTRAMUSCULAR; INTRAVENOUS AS NEEDED
Status: DISCONTINUED | OUTPATIENT
Start: 2021-12-01 | End: 2021-12-01 | Stop reason: SURG

## 2021-12-01 RX ORDER — HYDRALAZINE HYDROCHLORIDE 20 MG/ML
5 INJECTION INTRAMUSCULAR; INTRAVENOUS
Status: DISCONTINUED | OUTPATIENT
Start: 2021-12-01 | End: 2021-12-01 | Stop reason: HOSPADM

## 2021-12-01 RX ORDER — LIDOCAINE HYDROCHLORIDE 20 MG/ML
INJECTION, SOLUTION INFILTRATION; PERINEURAL AS NEEDED
Status: DISCONTINUED | OUTPATIENT
Start: 2021-12-01 | End: 2021-12-01 | Stop reason: SURG

## 2021-12-01 RX ORDER — ONDANSETRON 2 MG/ML
4 INJECTION INTRAMUSCULAR; INTRAVENOUS ONCE AS NEEDED
Status: DISCONTINUED | OUTPATIENT
Start: 2021-12-01 | End: 2021-12-01 | Stop reason: HOSPADM

## 2021-12-01 RX ORDER — MIDAZOLAM HYDROCHLORIDE 1 MG/ML
1 INJECTION INTRAMUSCULAR; INTRAVENOUS
Status: DISCONTINUED | OUTPATIENT
Start: 2021-12-01 | End: 2021-12-01 | Stop reason: HOSPADM

## 2021-12-01 RX ORDER — CEFAZOLIN SODIUM 2 G/100ML
2 INJECTION, SOLUTION INTRAVENOUS ONCE
Status: COMPLETED | OUTPATIENT
Start: 2021-12-01 | End: 2021-12-01

## 2021-12-01 RX ORDER — DROPERIDOL 2.5 MG/ML
INJECTION, SOLUTION INTRAMUSCULAR; INTRAVENOUS AS NEEDED
Status: DISCONTINUED | OUTPATIENT
Start: 2021-12-01 | End: 2021-12-01 | Stop reason: SURG

## 2021-12-01 RX ORDER — DIPHENHYDRAMINE HCL 25 MG
25 CAPSULE ORAL
Status: DISCONTINUED | OUTPATIENT
Start: 2021-12-01 | End: 2021-12-01 | Stop reason: HOSPADM

## 2021-12-01 RX ORDER — NEOSTIGMINE METHYLSULFATE 0.5 MG/ML
INJECTION, SOLUTION INTRAVENOUS AS NEEDED
Status: DISCONTINUED | OUTPATIENT
Start: 2021-12-01 | End: 2021-12-01 | Stop reason: SURG

## 2021-12-01 RX ORDER — EPHEDRINE SULFATE 50 MG/ML
5 INJECTION, SOLUTION INTRAVENOUS ONCE AS NEEDED
Status: DISCONTINUED | OUTPATIENT
Start: 2021-12-01 | End: 2021-12-01 | Stop reason: HOSPADM

## 2021-12-01 RX ADMIN — GLYCOPYRROLATE 0.4 MG: 0.2 INJECTION INTRAMUSCULAR; INTRAVENOUS at 09:57

## 2021-12-01 RX ADMIN — ROCURONIUM BROMIDE 10 MG: 50 INJECTION INTRAVENOUS at 08:36

## 2021-12-01 RX ADMIN — LIDOCAINE HYDROCHLORIDE 100 MG: 20 INJECTION, SOLUTION INFILTRATION; PERINEURAL at 07:33

## 2021-12-01 RX ADMIN — FAMOTIDINE 20 MG: 10 INJECTION INTRAVENOUS at 07:09

## 2021-12-01 RX ADMIN — IBUPROFEN 600 MG: 600 TABLET ORAL at 16:44

## 2021-12-01 RX ADMIN — FENTANYL CITRATE 50 MCG: 50 INJECTION INTRAMUSCULAR; INTRAVENOUS at 16:18

## 2021-12-01 RX ADMIN — KETOROLAC TROMETHAMINE 30 MG: 30 INJECTION, SOLUTION INTRAMUSCULAR at 09:55

## 2021-12-01 RX ADMIN — DROPERIDOL 0.62 MG: 2.5 INJECTION, SOLUTION INTRAMUSCULAR; INTRAVENOUS at 09:00

## 2021-12-01 RX ADMIN — CEFAZOLIN SODIUM 2 G: 2 INJECTION, SOLUTION INTRAVENOUS at 07:22

## 2021-12-01 RX ADMIN — ROCURONIUM BROMIDE 40 MG: 50 INJECTION INTRAVENOUS at 07:33

## 2021-12-01 RX ADMIN — FENTANYL CITRATE 50 MCG: 0.05 INJECTION, SOLUTION INTRAMUSCULAR; INTRAVENOUS at 07:51

## 2021-12-01 RX ADMIN — HYDROMORPHONE HYDROCHLORIDE 0.5 MG: 1 INJECTION, SOLUTION INTRAMUSCULAR; INTRAVENOUS; SUBCUTANEOUS at 10:57

## 2021-12-01 RX ADMIN — LIDOCAINE HYDROCHLORIDE: 20 JELLY TOPICAL at 17:54

## 2021-12-01 RX ADMIN — FENTANYL CITRATE 50 MCG: 0.05 INJECTION, SOLUTION INTRAMUSCULAR; INTRAVENOUS at 07:33

## 2021-12-01 RX ADMIN — FENTANYL CITRATE 50 MCG: 50 INJECTION INTRAMUSCULAR; INTRAVENOUS at 10:25

## 2021-12-01 RX ADMIN — OXYCODONE AND ACETAMINOPHEN 1 TABLET: 5; 325 TABLET ORAL at 15:08

## 2021-12-01 RX ADMIN — ONDANSETRON 4 MG: 2 INJECTION INTRAMUSCULAR; INTRAVENOUS at 09:50

## 2021-12-01 RX ADMIN — PROPOFOL 25 MCG/KG/MIN: 10 INJECTION, EMULSION INTRAVENOUS at 07:45

## 2021-12-01 RX ADMIN — MIDAZOLAM 0.5 MG: 1 INJECTION INTRAMUSCULAR; INTRAVENOUS at 07:14

## 2021-12-01 RX ADMIN — NEOSTIGMINE METHYLSULFATE 2 MG: 0.5 INJECTION INTRAVENOUS at 09:57

## 2021-12-01 RX ADMIN — DEXAMETHASONE SODIUM PHOSPHATE 8 MG: 10 INJECTION INTRAMUSCULAR; INTRAVENOUS at 07:47

## 2021-12-01 RX ADMIN — SODIUM CHLORIDE, POTASSIUM CHLORIDE, SODIUM LACTATE AND CALCIUM CHLORIDE: 600; 310; 30; 20 INJECTION, SOLUTION INTRAVENOUS at 09:30

## 2021-12-01 RX ADMIN — SODIUM CHLORIDE, POTASSIUM CHLORIDE, SODIUM LACTATE AND CALCIUM CHLORIDE 9 ML/HR: 600; 310; 30; 20 INJECTION, SOLUTION INTRAVENOUS at 07:01

## 2021-12-01 RX ADMIN — PROPOFOL 150 MG: 10 INJECTION, EMULSION INTRAVENOUS at 07:33

## 2021-12-01 RX ADMIN — HYDROMORPHONE HYDROCHLORIDE 0.5 MG: 1 INJECTION, SOLUTION INTRAMUSCULAR; INTRAVENOUS; SUBCUTANEOUS at 10:44

## 2021-12-01 RX ADMIN — OXYCODONE AND ACETAMINOPHEN 1 TABLET: 5; 325 TABLET ORAL at 10:44

## 2021-12-01 NOTE — DISCHARGE INSTRUCTIONS
Dr. Zbigniew Starr  [address]  [place] [address]  [phone]    Discharge Instructions for Hernia Surgery      1. Go home, rest and take it easy today; however, you should get up and move about several times today to reduce the risk of developing a clot in your legs.      2. You may experience some dizziness or memory loss from the anesthesia.  This may last for the next 24 hours.  Someone should plan on staying with you for the first 24 hours for your safety.    3. Do not make any important legal decisions or sign any legal papers for the next 24 hours.      4. Eat and drink lightly today.  Start off with liquids, jello, soup, crackers or other bland foods at first. You may advance your diet tomorrow as tolerated as long as you do not experience any nausea or vomiting.     5. You may remove your outer dressings in 3 days.  The white tapes called steri-strips should stay in place.  They will fall off on their own in 1-2 weeks.  Do not worry if they come off sooner.      6. You may notice some bleeding/drainage on your outer dressings. A little bloody drainage is normal. If the bleeding/drainage is such that the bandage cannot absorb it, remove the dressing, apply clean gauze and apply firm pressure for a full 15 minutes.  If the bleeding continues, please call me.    7. You may shower tomorrow.  No tub baths until your incisions are completely healed.      8. No lifting > 20 lbs. until you are seen at your follow-up visit.         9. You have received a prescription for a narcotic pain medicine, as you will have some pain following surgery.   You will not be totally pain free, but your pain medicine should make the pain tolerable.  Please take your pain medicine as prescribed and always take your pills with food to prevent nausea. If you are having severe pain that cannot be controlled by the pain medicine, please contact me.      10. You have also received a prescription for an anti-nausea  medicine.  Please take this as prescribed for any nausea or vomiting.  Nausea could be a result of the anesthesia or a result of the narcotic pain medicine.  If you experience severe nausea and vomiting that cannot be controlled by the nausea medicine, please call me.      11. If you had a laparoscopic surgery, it is not unusual to experience pain/discomfort in your shoulders or under your ribs after surgery.  It is from the gas used during the laparoscopic procedure and usually lasts 1-3 days.  The prescription pain medicine is used to treat the surgical pain and does not typically alleviate this “gassy” pain.     12. No driving for 24 hours and for as long as you are taking your prescription pain medicine.              13. You will need to call the office at [phone] to schedule a follow-up appointment in 6-10 days.     14. Remember to contact me for any of the following:    • Fever > 101 degrees  • Severe pain that cannot be controlled by taking your pain pills  • Severe nausea or vomiting that cannot be controlled by taking your nausea pills  • Significant bleeding of your incisions  • Drainage that has a bad smell or is yellow or green in appearance  • Any other questions or concerns        Additional Instruction for Inguinal Hernia Patients Only    1. If you did not urinate at the hospital after your surgery or if you feel the need to urinate and cannot, this will necessitate a return to the Emergency Room for placement of a urinary catheter.  You should also notify me as well.  As a rule, you should be able to empty your bladder within 4-6 hours after discharge from the hospital.      2. You may notice some scrotal bruising and/or swelling. A scrotal support or briefs as well as ice packs may be used to alleviate discomfort.

## 2021-12-01 NOTE — PERIOPERATIVE NURSING NOTE
Patient has been in Ph 2 since 1125, his sheffield catheter has been out since 1000. Patient has been up to ambulate and has attempted to void 4 times. He has been bladder scanned several times, has had nearly 1 liter of IVFs, has had 4 cups of PO liquid and has still be unable to void. Last bladder scan showed 698ml, bladder felt slightly distended and tender to palpation. Per standing orders, I performed a straight catheterization and drained 500ml from his bladder, residual bladder scan was 176ml.

## 2021-12-01 NOTE — H&P
Referring MD:  Ollie Davila DO     Reason for Visit      Chief Complaint   Patient presents with   • Hernia         History of Present Illness:     Kenneth Jean Baptiste is a 66 y.o. male who presents with a bilateral masses in his inguinal region.  He is here today for second opinion regarding treatment of his hernias.  He reports having small bulge over the right groin now for several years.  He reports intermittent episodes of right lower quadrant abdominal pain as well as right groin pain.  He has not been able to reduce any bulges from the area.  He has history of appendectomy.  He has a sister that had appendectomy later in life developed pseudomyxoma peritonei.  He is concerned about the possibility of having pseudomyxoma peritonei.  The pain sometimes radiates to the back and improves with palpation of the area.  He has history of lumbar vertebral disease.  He does not have any recent weight loss.  He has history of constipation.  He takes multiple laxatives as well as stool softeners.  Has been on Linzess.  He also complains of intermittent episode of left groin pain.  He has felt a bulge over the area for the past several years.  He was diagnosed by Dr. Medina with bilateral inguinal hernia recommend he undergo inguinal hernia repair.    He also has intermittent episodes of abdominal pain located over the umbilical area without radiation.  Pain is sharp and usually self-limited.  He does not feel any bulge over the area.  He also has left upper abdominal pain that is burning in nature and radiates to his left flank and back.  He has been able to tolerate diet without any nausea or vomiting.  He has been having better bowel function now that he was started on Linzess.  He underwent multiple CT scan of the abdomen and pelvis, none of them with images available but per reports.  1 CT scan reports a recurrent right inguinal hernia.  Other scans did not mention anything about hernia     Past Medical  History         Patient Active Problem List   Diagnosis   • Degeneration of intervertebral disc of mid-cervical region   • Left facial numbness   • Migraine without aura and without status migrainosus, not intractable   • Graves' disease   • Postoperative hypothyroidism   • Heart palpitations   • Chest pain   • Thrombocytopenia (CMS/HCC)   • Bilateral leg weakness   • Cervical disc disorder at C5-C6 level with radiculopathy   • Chronic bilateral thoracic back pain   • DDD (degenerative disc disease), lumbar   • Bilateral recurrent inguinal hernia without obstruction or gangrene   • Umbilical hernia without obstruction and without gangrene            Past Surgical History     Surgical History         Past Surgical History:   Procedure Laterality Date   • APPENDECTOMY   03/2013     Dr. Granado   • BICEPS TENDON REPAIR   08/28/2012   • COLONOSCOPY N/A 02/2021   • EPIDURAL BLOCK       • INGUINAL HERNIA REPAIR Right 2000     Dr. Mckenna   • JOINT REPLACEMENT Left       hip repair 2012, replaced in 1980's   • THYROIDECTOMY, PARTIAL   06/08/2016            Allergies           Allergies   Allergen Reactions   • Sulfa Antibiotics Anaphylaxis   • Ciprofloxacin Other (See Comments)       Causes thrush   • Citalopram         PT STATED HIS HEAD HURT WHEN TAKING CELEXA    • Codeine Nausea Only   • Meperidine Nausea Only   • Sertraline Hcl         UNPLEASANT FEELING AND NAUSEA         Medications  Current Medications          Current Outpatient Medications   Medication Sig Dispense Refill   • allopurinol (ZYLOPRIM) 100 MG tablet Take 300 mg by mouth Daily.   1   • ALPRAZolam (XANAX) 0.5 MG tablet Take 1 tablet by mouth At Night As Needed.       • Aspirin-Acetaminophen-Caffeine (EXCEDRIN PO) Take 1 tablet by mouth As Needed.       • B Complex Vitamins (B-Complex/B-12) tablet Take 1 tablet by mouth Daily.       • Cholecalciferol (VITAMIN D3) 2000 UNITS capsule Take 2,000 Units by mouth Daily.       • lactulose (CHRONULAC) 10 GM/15ML  solution Daily As Needed.       • lansoprazole (PREVACID) 15 MG capsule Take 20 mg by mouth Daily.       • levothyroxine (SYNTHROID, LEVOTHROID) 100 MCG tablet Take 1 tablet by mouth Daily. Wait to start until stress test has been done and is ok. 30 tablet 0   • Linaclotide 145 MCG capsule Take 1 capsule by mouth as needed.       • liothyronine (CYTOMEL) 5 MCG tablet Take 5 mcg by mouth.       • loratadine (CLARITIN) 10 MG tablet Take 10 mg by mouth Daily.       • naproxen sodium (ALEVE) 220 MG tablet Take 220 mg by mouth 2 (two) times a day as needed for mild pain (1-3).       • polyethylene glycol (MIRALAX) 17 g packet Take 17 g by mouth Daily.       • traMADol (ULTRAM) 50 MG tablet Take 50 mg by mouth every 6 (six) hours as needed for moderate pain (4-6).          No current facility-administered medications for this visit.               Social History  Social History   Social History            Socioeconomic History   • Marital status:        Spouse name: Not on file   • Number of children: Not on file   • Years of education: Not on file   • Highest education level: Not on file   Tobacco Use   • Smoking status: Never Smoker   • Smokeless tobacco: Never Used   Vaping Use   • Vaping Use: Never used   Substance and Sexual Activity   • Alcohol use: Yes       Alcohol/week: 7.0 standard drinks       Types: 7 Standard drinks or equivalent per week       Comment: 1/day   • Drug use: No   • Sexual activity: Yes       Partners: Female       Birth control/protection: None            Family History        Family History   Problem Relation Age of Onset   • Cancer Mother     • Stroke Father     • Heart disease Father     • Cancer Sister     • No Known Problems Daughter     • Graves' disease Son     • Cancer Maternal Aunt              Review of Systems       CONSTITUTIONAL: Denies fevers, chills, unintentional weight loss or weight gain  RESPIRATORY: Denies chronic cough or sob.   CARDIAC: Denies chest pain,  "palpitations, edema  GI: Denies dyspepsia, reflux, heartburn, nausea, vomiting, diarrhea but reports constipation  : Denies dysuria or hematuria.  MUSCULOSKELETAL: Denies muscle weakness and pain   NEURO: Denies chronic headaches.   ENDOCRINE: Denies significant heat or cold intolerance or history of thyroid problems.   DERM: no rashes,lesions or discharge.      Physical Exam  Ht 172.7 cm (68\")   Wt 63.5 kg (140 lb)   BMI 21.29 kg/m²   Body mass index is 21.29 kg/m².  General: Alert oriented x3, no acute distress  HEENT: EOMI, conjunctiva clear, moist mucus membranes.  Lungs: clear to auscultation and percussion, no chest deformities noted.  Cardiovascular:  Regular rate and rhythm  Extremities: Without clubbing cyanosis or edema  Musculoskeletal: Without acute abnormality  Skin:  No rashes or hematomas.  Neuro: Gait normal. Sensation and strength grossly normal.  Abdominal exam: soft, mildly tender right lower quadrant, no rebound or guarding, no distention, no peritoneal irritation.  Small and easily reducible right inguinal hernia, moderate size and is reducible left inguinal hernia.  Mild tenderness over the area.  Small easily reducible umbilical hernia.  Well-healed laparoscopic incisions     Medical Decision Making  Test Results:           Results for orders placed or performed during the hospital encounter of 12/20/17   Comprehensive Metabolic Panel     Specimen: Blood   Result Value Ref Range     Glucose 101 (H) 65 - 99 mg/dL     BUN 17 8 - 23 mg/dL     Creatinine 1.07 0.76 - 1.27 mg/dL     Sodium 142 136 - 145 mmol/L     Potassium 3.8 3.5 - 5.2 mmol/L     Chloride 101 98 - 107 mmol/L     CO2 28.6 22.0 - 29.0 mmol/L     Calcium 9.3 8.6 - 10.5 mg/dL     Total Protein 7.8 6.0 - 8.5 g/dL     Albumin 4.50 3.50 - 5.20 g/dL     ALT (SGPT) 9 1 - 41 U/L     AST (SGOT) 15 1 - 40 U/L     Alkaline Phosphatase 65 39 - 117 U/L     Total Bilirubin 0.4 0.1 - 1.2 mg/dL     eGFR Non African Amer 70 >60 mL/min/1.73     " Globulin 3.3 gm/dL     A/G Ratio 1.4 g/dL     BUN/Creatinine Ratio 15.9 7.0 - 25.0     Anion Gap 12.4 mmol/L   CBC Auto Differential     Specimen: Blood   Result Value Ref Range     WBC 6.29 4.50 - 10.70 10*3/mm3     RBC 4.68 4.60 - 6.00 10*6/mm3     Hemoglobin 15.5 13.7 - 17.6 g/dL     Hematocrit 44.7 40.4 - 52.2 %     MCV 95.5 79.8 - 96.2 fL     MCH 33.1 (H) 27.0 - 32.7 pg     MCHC 34.7 32.6 - 36.4 g/dL     RDW 13.5 11.5 - 14.5 %     RDW-SD 46.3 37.0 - 54.0 fl     MPV 10.0 6.0 - 12.0 fL     Platelets 155 140 - 500 10*3/mm3     Neutrophil % 58.1 42.7 - 76.0 %     Lymphocyte % 29.7 19.6 - 45.3 %     Monocyte % 6.8 5.0 - 12.0 %     Eosinophil % 4.5 0.3 - 6.2 %     Basophil % 0.6 0.0 - 1.5 %     Immature Grans % 0.3 0.0 - 0.5 %     Neutrophils, Absolute 3.65 1.90 - 8.10 10*3/mm3     Lymphocytes, Absolute 1.87 0.90 - 4.80 10*3/mm3     Monocytes, Absolute 0.43 0.20 - 1.20 10*3/mm3     Eosinophils, Absolute 0.28 0.00 - 0.70 10*3/mm3     Basophils, Absolute 0.04 0.00 - 0.20 10*3/mm3     Immature Grans, Absolute 0.02 0.00 - 0.03 10*3/mm3   D-dimer, Quantitative     Specimen: Blood   Result Value Ref Range     D-Dimer, Quantitative <0.27 0.00 - 0.49 MCGFEU/mL   Light Blue Top   Result Value Ref Range     Extra Tube hold for add-on     Green Top (Gel)   Result Value Ref Range     Extra Tube Hold for add-ons.     Lavender Top   Result Value Ref Range     Extra Tube hold for add-on     Gold Top - SST   Result Value Ref Range     Extra Tube Hold for add-ons.        CT scan abdomen pelvis with IV and oral contrast 2/23/2021 (only report available): No acute CT findings, colonic diverticulosis, prostamegaly.     CT scan abdomen pelvis 4/8/2020: Enlarged prostate, liver lesions consistent with hemangiomas, postsurgical changes from prior right inguinal hernia repair with small fat-containing hernia in the inguinal canal.     Colonoscopy report 1/29/2021: Nonbleeding diverticula, normal terminal ileum       Impression:  This is a  66 y.o. male patient with recurrent reducible right inguinal hernia, left inguinal hernia, reducible umbilical hernia.  Discussed with him about treatment options including conservative management versus surgical intervention.  At this point he is minimally symptomatic and does not need to have this urgently fix.  Discussed with him about possibility of conservative management with watchful waiting for worsening symptoms or hernia enlargement versus surgical repair of the hernia.  Risk and benefits of both approaches including the risk of incarceration strangulation and the need for emergency operation with conservative management versus risk and benefits of surgical intervention including bleeding, infection, mesh infection, chronic pain, intra-abdominal injuries as well as the risk of hernia recurrence were discussed in detail with the patient.  I offer him robotic assisted laparoscopic bilateral inguinal hernia repair with mesh placement and umbilical hernia repair with mesh.  Discussed with him that due to the recurrent nature of the right inguinal hernia that was done laparoscopically in the past he may need to have open right inguinal hernia and robotic left inguinal hernia repair.  I will try to repair his umbilical hernia robotically but if not possible then I would proceed to repair it with mesh in an open fashion.  He understand that his constipation most likely worsen after surgery due to narcotic pain medication use and the risk of urinary retention that is higher in his case due to enlarged prostate.  I discussed with him about the need to start Flomax preop to try to prevent urinary retention postop.  He is allergic to sulfa and had cardiac arrest and although cross-reaction with Flomax is very rare and hesitant to start him on Flomax.  I will discuss with urology service about different options and get back to the patient.     Of note: I discussed with him that to me is unlikely that he will  have  pseudomyxoma peritonei, there is a extremely rare disease and none of the imaging suggest evidence of this.  I will take a detailed look of the abdomen during surgery     Plan:  Surgical scheduling process is initiated.

## 2021-12-01 NOTE — ANESTHESIA PROCEDURE NOTES
Airway  Urgency: elective    Date/Time: 12/1/2021 7:33 AM  Airway not difficult    General Information and Staff    Patient location during procedure: OR  CRNA: Lakia Anthony CRNA    Indications and Patient Condition  Indications for airway management: airway protection    Preoxygenated: yes  MILS not maintained throughout  Mask difficulty assessment: 1 - vent by mask    Final Airway Details  Final airway type: endotracheal airway      Successful airway: ETT  Cuffed: yes   Successful intubation technique: direct laryngoscopy  Endotracheal tube insertion site: oral  Blade: Caitie  Blade size: 4  ETT size (mm): 7.5  Cormack-Lehane Classification: grade I - full view of glottis  Placement verified by: chest auscultation and capnometry   Measured from: lips  ETT/EBT  to lips (cm): 23  Number of attempts at approach: 1  Assessment: lips, teeth, and gum same as pre-op and atraumatic intubation    Additional Comments  Dentition intact and unchanged. CBEBS.  +ETCO2.

## 2021-12-01 NOTE — ANESTHESIA POSTPROCEDURE EVALUATION
Patient: Kenneth Jean Baptiste    Procedure Summary     Date: 12/01/21 Room / Location: [facility] OR [address] [place] [address] MAIN OR    Anesthesia Start: 0726 Anesthesia Stop: 1019    Procedure: BILATERAL INGUINAL HERNIA REPAIR LAPAROSCOPIC WITH DAVINCI ROBOT UMBILICAL HERNIA REPAIR (Bilateral Abdomen) Diagnosis:       Bilateral recurrent inguinal hernia without obstruction or gangrene      Umbilical hernia without obstruction and without gangrene      (Bilateral recurrent inguinal hernia without obstruction or gangrene [K40.21])      (Umbilical hernia without obstruction and without gangrene [K42.9])    Surgeons: Zbigniew Starr MD Provider: Vishnu Lamas MD    Anesthesia Type: general ASA Status: 2          Anesthesia Type: general    Vitals  Vitals Value Taken Time   /74 12/01/21 1116   Temp 36.6 °C (97.8 °F) 12/01/21 1109   Pulse 77 12/01/21 1120   Resp 16 12/01/21 1115   SpO2 97 % 12/01/21 1120   Vitals shown include unvalidated device data.        Post Anesthesia Care and Evaluation    Patient location during evaluation: PACU  Patient participation: complete - patient participated  Level of consciousness: awake and alert  Pain management: adequate  Airway patency: patent  Anesthetic complications: No anesthetic complications    Cardiovascular status: acceptable  Respiratory status: acceptable  Hydration status: acceptable    Comments: --------------------            12/01/21               1201     --------------------   BP:       136/72     Pulse:      90       Resp:       18       Temp:                SpO2:      97%      --------------------

## 2021-12-01 NOTE — ANESTHESIA PREPROCEDURE EVALUATION
" Anesthesia Evaluation     Patient summary reviewed and Nursing notes reviewed   history of anesthetic complications: PONV               Airway   Mallampati: II  TM distance: >3 FB  Neck ROM: full  Dental      Pulmonary - negative pulmonary ROS   Cardiovascular     ECG reviewed  Rhythm: regular  Rate: normal    (+) hypertension,       Neuro/Psych  (+) headaches, numbness,     GI/Hepatic/Renal/Endo    (+)  GERD,      Musculoskeletal     (+) neck pain,   Abdominal    Substance History - negative use     OB/GYN negative ob/gyn ROS         Other   arthritis,                      Anesthesia Plan    ASA 2     general   (Patient goes by \"Castillo\"    Patient prefers to NOT have a scope     I have reviewed the patient's history with the patient and the chart, including all pertinent laboratory results and imaging. I have explained the risks of anesthesia including but not limited to dental damage, corneal abrasion, nerve injury, MI, stroke, and death. Questions asked and answered. Anesthetic plan discussed with patient and team as indicated. Patient expressed understanding of the above.  )  intravenous induction     Anesthetic plan, all risks, benefits, and alternatives have been provided, discussed and informed consent has been obtained with: patient.      "

## 2021-12-01 NOTE — PERIOPERATIVE NURSING NOTE
"Patient states that he was able to dribble \"a few ounces\" of urine into the toilet, called me into the bathroom because of the amount of blood coming from his penis. The toilet is full of blood, drops of blood all over the toilet seat and patient is continuing to drip some blood and there is 1 quarter sized clot that has come out of the urethra. Patient assisted with clean up. Back to bed, bladder scan shows 930ml. Explained to patient that we will use lidocaine jelly this time to help with pain and discomfort and a coude catheter to hopefully, make insertion easier. Patient agrees.  "

## 2021-12-01 NOTE — BRIEF OP NOTE
INGUINAL HERNIA REPAIR LAPAROSCOPIC WITH DAVINCI ROBOT  Progress Note    Kenneth Jean Baptiste  12/1/2021    Pre-op Diagnosis:   Bilateral recurrent inguinal hernia without obstruction or gangrene [K40.21]  Umbilical hernia without obstruction and without gangrene [K42.9]       Post-Op Diagnosis Codes:     * Bilateral recurrent inguinal hernia without obstruction or gangrene [K40.21]     * Umbilical hernia without obstruction and without gangrene [K42.9]    Procedure/CPT® Codes:        Procedure(s):  BILATERAL INGUINAL HERNIA REPAIR LAPAROSCOPIC WITH DAVINCI ROBOT UMBILICAL HERNIA REPAIR    Surgeon(s):  Zbigniew tSarr MD    Anesthesia: General    Staff:   Circulator: Eleanor Mckeon RN; Ronald Stoner RN  Scrub Person: Kendal Gary; Katherine Salcedo  Assistant: Fortunato Saez CSA  Assistant: Fortunato Saez CSA      Estimated Blood Loss: minimal    Urine Voided: * No values recorded between 12/1/2021  7:26 AM and 12/1/2021  9:58 AM *    Specimens:                Metallic tackers removed not sent for pathology          Drains:   [REMOVED] Urethral Catheter Silicone 16 Fr. (Removed)       Findings: Recurrent right direct inguinal hernia, evidence of prior laparoscopic repair with mesh that have significantly shrink and was covering only part of the direct space.  There were metallic tackers to the Arvin ligament that were removed  Indirect left inguinal hernia    Complications: None    Assistant: Fortunato Saez CSA  was responsible for performing the following activities: Retraction, Suction, Irrigation, Suturing, Closing, Placing Dressing and Held/Positioned Camera and their skilled assistance was necessary for the success of this case.    Zbigniew Starr MD     Date: 12/1/2021  Time: 09:58 EST

## 2021-12-02 ENCOUNTER — TELEPHONE (OUTPATIENT)
Dept: SURGERY | Facility: CLINIC | Age: 66
End: 2021-12-02

## 2021-12-02 ENCOUNTER — TELEPHONE (OUTPATIENT)
Dept: NEUROSURGERY | Facility: CLINIC | Age: 66
End: 2021-12-02

## 2021-12-02 NOTE — PERIOPERATIVE NURSING NOTE
Home care instructions for the urinary catheter reviewed and demonstrated with patient and his wife. Standard drainage bag emptied and removed, replaced with a leg bag. Sending patient home with alcohol pads and disposable cleansing cloths.

## 2021-12-02 NOTE — TELEPHONE ENCOUNTER
Patient called and stated scrotum was swelled up like a baseball. Patient was advised to elevate area and apply ice as this will help and eventually will start to go down. Patient also is prescribed Ibuprofen and he was encouraged to take as this would help with inflammation and pain. Patient voiced understanding. If there are any other recommendations please advise.

## 2021-12-02 NOTE — TELEPHONE ENCOUNTER
Caller: Kenneth Jean Baptiste    Relationship to patient: Self    Best call back number:     Chief complaint: PATIENT CANNOT MAKE APPOINTMENT ON 12/06/2021 DUE TO SURGICAL COMPLICATIONS HE'S HAVING FROM A UROLOGY PROCEDURE, ANOTHER APPOINTMENT WAS SCHEDULED FOR THIS DAY.     Type of visit: FOLLOW UP (6 MONTH)    Requested date: Return in about 6 months (around 11/19/2021) for Face-to-face. Office Visit with Mu Can MD (05/19/2021)      If rescheduling, when is the original appointment: Appointment with Mu Can MD (12/06/2021)      Additional notes: PLEASE CONTACT PATIENT WHEN ABLE TO BE RESCHEDULED. HE IS UNABLE TO MAKE HIS APPOINTMENT DUE TO A CONFLICTING URGENT POST-OP APPOINTMENT W/ UROLOGY.     THANK YOU VERY MUCH.

## 2021-12-03 ENCOUNTER — TELEPHONE (OUTPATIENT)
Dept: SURGERY | Facility: CLINIC | Age: 66
End: 2021-12-03

## 2021-12-03 NOTE — TELEPHONE ENCOUNTER
Patient has called and complained of white skin patch and redness to Uvula. Please review picture and advise.

## 2021-12-03 NOTE — TELEPHONE ENCOUNTER
I called the patient.  On review of the picture, it looks as though he has a mild abrasion to his uvula which could have been induced during anesthesia intubation.  He could also have strep throat, but that would be highly unlikely and would usually involve more of the lateral tonsils as opposed to the uvula.  A uvular abrasion from the endotracheal tube will heal fine on its own and I encouraged him to start doing twice daily warm salt water swish and spit.  He reports he had a temperature of 99.9 today, which I explained is probably from the inflammation of his uvula but does not warrant any oral antibiotics.  He expressed understanding.

## 2021-12-13 NOTE — PROGRESS NOTES
Cc: Post-op check hernia surgery    S: Kenneth Jean Baptiste is a 66 y.o. male patient here for follow up of his robotic assisted laparoscopic bilateral inguinal hernia repair with mesh placement and umbilical hernia repair on 12/1/2021.  Patient had urinary retention postop and required Real catheter placement. Real catheter was removed he has been urinating without any problem. He was placed on Flomax.  He had inflammation of the uvula that is now completely resolved. He reports that he has been having bowel movements as he never had for the past 2 years  Patient complains of left testicular pain    O:   Alert, no acute distress  Breathing comfortable  Wounds are healing well without signs of infection.  No current signs of recurrence. Small right inguinal seroma  Abdomen: soft, non tender.     A/P Patient s/p robotic assisted laparoscopic bilateral inguinal hernia repair with mesh placement and umbilical hernia repair was complicated by urinary retention. The patient is doing great and denies any major issues.   Having great bowel function, still mild testicular pain that I discussed with him the most likely will improve with time.    He was instructed to continue with limited activity for the time being.      At 4 weeks he may resume light aerobic activity, followed by a trial of lifting heavier objects at 6 weeks.    He will follow-up in my office in 1 month    The patient was instructed to call with any difficulties.      Zbigniew Starr MD  General, Minimally Invasive and Endoscopic Surgery  [facility]    [address] [place] [address], [address]  [place], [place], [address]  P: [phone]  F: [phone]

## 2021-12-14 ENCOUNTER — OFFICE VISIT (OUTPATIENT)
Dept: SURGERY | Facility: CLINIC | Age: 66
End: 2021-12-14

## 2021-12-14 DIAGNOSIS — Z51.89 VISIT FOR WOUND CHECK: ICD-10-CM

## 2021-12-14 DIAGNOSIS — Z09 POSTOP CHECK: Primary | ICD-10-CM

## 2021-12-14 PROCEDURE — 99024 POSTOP FOLLOW-UP VISIT: CPT | Performed by: SURGERY

## 2021-12-14 RX ORDER — LACTULOSE 10 G/15ML
20 SOLUTION ORAL 2 TIMES DAILY
COMMUNITY

## 2021-12-14 RX ORDER — TAMSULOSIN HYDROCHLORIDE 0.4 MG/1
1 CAPSULE ORAL DAILY
COMMUNITY
Start: 2021-12-06 | End: 2022-08-24

## 2021-12-14 RX ORDER — LEVOTHYROXINE SODIUM 88 MCG
88 TABLET ORAL DAILY
COMMUNITY
Start: 2021-12-13

## 2022-01-27 ENCOUNTER — OFFICE (AMBULATORY)
Dept: URBAN - METROPOLITAN AREA CLINIC 75 | Facility: CLINIC | Age: 67
End: 2022-01-27

## 2022-01-27 VITALS
HEIGHT: 68 IN | WEIGHT: 141 LBS | HEART RATE: 61 BPM | SYSTOLIC BLOOD PRESSURE: 128 MMHG | OXYGEN SATURATION: 100 % | DIASTOLIC BLOOD PRESSURE: 62 MMHG

## 2022-01-27 DIAGNOSIS — K58.1 IRRITABLE BOWEL SYNDROME WITH CONSTIPATION: ICD-10-CM

## 2022-01-27 DIAGNOSIS — R10.31 RIGHT LOWER QUADRANT PAIN: ICD-10-CM

## 2022-01-27 DIAGNOSIS — K41.90: ICD-10-CM

## 2022-01-27 DIAGNOSIS — K57.30 DIVERTICULOSIS OF LARGE INTESTINE WITHOUT PERFORATION OR ABS: ICD-10-CM

## 2022-01-27 DIAGNOSIS — R14.3 FLATULENCE: ICD-10-CM

## 2022-01-27 DIAGNOSIS — R19.4 CHANGE IN BOWEL HABIT: ICD-10-CM

## 2022-01-27 DIAGNOSIS — Z86.010 PERSONAL HISTORY OF COLONIC POLYPS: ICD-10-CM

## 2022-01-27 DIAGNOSIS — K59.09 OTHER CONSTIPATION: ICD-10-CM

## 2022-01-27 DIAGNOSIS — R10.30 LOWER ABDOMINAL PAIN, UNSPECIFIED: ICD-10-CM

## 2022-01-27 DIAGNOSIS — Z80.9 FAMILY HISTORY OF MALIGNANT NEOPLASM, UNSPECIFIED: ICD-10-CM

## 2022-01-27 PROCEDURE — 99214 OFFICE O/P EST MOD 30 MIN: CPT | Performed by: NURSE PRACTITIONER

## 2022-02-14 NOTE — PROGRESS NOTES
Cc: Post-op check hernia surgery     S: Kenneth Jean Baptiste is a 66 y.o. male  patient here for follow up of his robotic assisted laparoscopic bilateral inguinal hernia repair with mesh placement and umbilical hernia repair on 12/1/2021.   He is feeling much better. He denies any abdominal pain nausea vomiting. He reports intermittent episodes of left testicular pain with pain the last for several minutes. The pain improves by itself.    O:   Alert, no acute distress  Breathing comfortable  Wounds are healing well without signs of infection.  No current signs of recurrence. There is no testicular swelling but there is exquisite tender to palpation over the left testicle.  Abdomen: soft, non tender.     A/P: Patient s/p robotic assisted laparoscopic bilateral inguinal hernia repair with mesh placement and umbilical hernia repair. Patient with the only complaint of left testicular pain. Left testicle tender to palpation. Not distended or swollen.   I discussed with him about complications from hernia repair. Testicular pain is possible but extremely rare. Discussed with him about the need to follow-up with Dr. Loredo for evaluation of testicular pain. For now continue conservative management with pain medication as needed.    Follow-up in my office in 2 months if pain does not improve. He understood    Zbigniew Starr MD, FACS  General, Minimally Invasive and Endoscopic Surgery  [facility]    [address], [address]  [place], [place], [address]  P: [phone]  F: [phone]

## 2022-02-15 ENCOUNTER — OFFICE VISIT (OUTPATIENT)
Dept: SURGERY | Facility: CLINIC | Age: 67
End: 2022-02-15

## 2022-02-15 VITALS — WEIGHT: 147 LBS | HEIGHT: 68 IN | BODY MASS INDEX: 22.28 KG/M2

## 2022-02-15 DIAGNOSIS — Z51.89 VISIT FOR WOUND CHECK: Primary | ICD-10-CM

## 2022-02-15 DIAGNOSIS — Z09 POSTOP CHECK: ICD-10-CM

## 2022-02-15 DIAGNOSIS — N50.812 TESTICULAR PAIN, LEFT: ICD-10-CM

## 2022-02-15 PROCEDURE — 99024 POSTOP FOLLOW-UP VISIT: CPT | Performed by: SURGERY

## 2022-02-15 RX ORDER — MELOXICAM 15 MG/1
15 TABLET ORAL DAILY
COMMUNITY

## 2022-05-02 ENCOUNTER — OFFICE VISIT (OUTPATIENT)
Dept: NEUROSURGERY | Facility: CLINIC | Age: 67
End: 2022-05-02

## 2022-05-02 VITALS
SYSTOLIC BLOOD PRESSURE: 130 MMHG | TEMPERATURE: 97.6 F | BODY MASS INDEX: 22.32 KG/M2 | WEIGHT: 147.27 LBS | HEIGHT: 68 IN | HEART RATE: 77 BPM | DIASTOLIC BLOOD PRESSURE: 80 MMHG | OXYGEN SATURATION: 96 %

## 2022-05-02 DIAGNOSIS — M50.123 CERVICAL DISC DISORDER AT C6-C7 LEVEL WITH RADICULOPATHY: ICD-10-CM

## 2022-05-02 DIAGNOSIS — M50.122 CERVICAL DISC DISORDER AT C5-C6 LEVEL WITH RADICULOPATHY: Primary | ICD-10-CM

## 2022-05-02 DIAGNOSIS — M54.6 CHRONIC BILATERAL THORACIC BACK PAIN: ICD-10-CM

## 2022-05-02 DIAGNOSIS — G89.29 CHRONIC BILATERAL THORACIC BACK PAIN: ICD-10-CM

## 2022-05-02 DIAGNOSIS — M54.2 CHRONIC NECK PAIN: ICD-10-CM

## 2022-05-02 DIAGNOSIS — G89.29 CHRONIC NECK PAIN: ICD-10-CM

## 2022-05-02 PROCEDURE — 99214 OFFICE O/P EST MOD 30 MIN: CPT | Performed by: NEUROLOGICAL SURGERY

## 2022-05-02 RX ORDER — CYCLOBENZAPRINE HCL 10 MG
10 TABLET ORAL 3 TIMES DAILY PRN
Qty: 60 TABLET | Refills: 3 | Status: SHIPPED | OUTPATIENT
Start: 2022-05-02 | End: 2022-07-11

## 2022-07-11 ENCOUNTER — OFFICE VISIT (OUTPATIENT)
Dept: NEUROSURGERY | Facility: CLINIC | Age: 67
End: 2022-07-11

## 2022-07-11 VITALS
BODY MASS INDEX: 22.35 KG/M2 | HEIGHT: 68 IN | DIASTOLIC BLOOD PRESSURE: 60 MMHG | OXYGEN SATURATION: 98 % | WEIGHT: 147.49 LBS | TEMPERATURE: 97.7 F | SYSTOLIC BLOOD PRESSURE: 100 MMHG | HEART RATE: 88 BPM

## 2022-07-11 DIAGNOSIS — M54.2 CHRONIC NECK PAIN: ICD-10-CM

## 2022-07-11 DIAGNOSIS — M62.838 NECK MUSCLE SPASM: ICD-10-CM

## 2022-07-11 DIAGNOSIS — G89.29 CHRONIC NECK PAIN: ICD-10-CM

## 2022-07-11 DIAGNOSIS — M50.123 CERVICAL DISC DISORDER AT C6-C7 LEVEL WITH RADICULOPATHY: ICD-10-CM

## 2022-07-11 DIAGNOSIS — M50.122 CERVICAL DISC DISORDER AT C5-C6 LEVEL WITH RADICULOPATHY: Primary | ICD-10-CM

## 2022-07-11 PROCEDURE — 99214 OFFICE O/P EST MOD 30 MIN: CPT | Performed by: NEUROLOGICAL SURGERY

## 2022-07-11 RX ORDER — METHOCARBAMOL 750 MG/1
750 TABLET, FILM COATED ORAL 2 TIMES DAILY PRN
Qty: 60 TABLET | Refills: 3 | Status: SHIPPED | OUTPATIENT
Start: 2022-07-11

## 2022-07-11 NOTE — PROGRESS NOTES
Subjective   Patient ID: Kenneth Jean Baptiste is a 67 y.o. male is here today for 2 month follow-up for neck and upper back pain after physical therapy.    Mr. Jean Baptiste reports he got minimal relief of pain from physical therapy. He reports he feels that there is more pain at the top of his left shoulder now that is now worse. He reports sometimes he feels that there is a lot of weight sitting on his shoulders.     This gentleman had some left-sided neck pain and some arm symptoms.  Physical therapy really did not help him.  The Flexeril made him too sleepy.  I asked him if they have tried dry needling and they have not.  His pain seems to be mainly in the trapezius on the left side of the neck associated with chronic muscle spasm.  The trapezius is quite tight and spasmed to me.  He has no motor deficits.  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  I am surprised the physical therapist did not try dry needling and we will [facility].  We will switch over from Flexeril to Robaxin and have him come back in 3 months.  We can have pain management try trigger point injections next time around if the dry needling is not helpful enough.    Neck Pain   This is a chronic problem. The current episode started more than 1 year ago. The problem occurs constantly. The problem has been gradually worsening. The pain is present in the left side and midline. The quality of the pain is described as aching, stabbing and shooting. The pain is at a severity of 2/10. The pain is mild. The symptoms are aggravated by position. Associated symptoms include numbness and tingling. Pertinent negatives include no fever or weakness. He has tried muscle relaxants and NSAIDs for the symptoms.       The following portions of the patient's history were reviewed and updated as appropriate: allergies,  "current medications, past family history, past medical history, past social history, past surgical history and problem list.    Review of Systems   Constitutional: Negative for activity change and fever.   Musculoskeletal: Positive for neck pain.   Neurological: Positive for tingling and numbness. Negative for weakness.   Psychiatric/Behavioral: Negative for sleep disturbance.   All other systems reviewed and are negative.          Objective     Vitals:    07/11/22 1439   BP: 100/60   Pulse: 88   Temp: 97.7 °F (36.5 °C)   SpO2: 98%   Weight: 66.9 kg (147 lb 7.8 oz)   Height: 172.7 cm (68\")     Body mass index is 22.43 kg/m².      Physical Exam  Constitutional:       Appearance: He is well-developed.   HENT:      Head: Normocephalic and atraumatic.   Eyes:      Extraocular Movements: EOM normal.      Conjunctiva/sclera: Conjunctivae normal.      Pupils: Pupils are equal, round, and reactive to light.   Neck:      Vascular: No carotid bruit.   Neurological:      Mental Status: He is oriented to person, place, and time.      Coordination: Finger-Nose-Finger Test and Heel to Shin Test normal.      Gait: Gait is intact.      Deep Tendon Reflexes:      Reflex Scores:       Tricep reflexes are 2+ on the right side and 2+ on the left side.       Bicep reflexes are 2+ on the right side and 2+ on the left side.       Brachioradialis reflexes are 2+ on the right side and 2+ on the left side.       Patellar reflexes are 2+ on the right side and 2+ on the left side.       Achilles reflexes are 2+ on the right side and 2+ on the left side.  Psychiatric:         Speech: Speech normal.       Neurologic Exam     Mental Status   Oriented to person, place, and time.   Registration of memory: Good recent and remote memory.   Attention: normal. Concentration: normal.   Speech: speech is normal   Level of consciousness: alert  Knowledge: consistent with education.     Cranial Nerves     CN II   Visual fields full to confrontation. "   Visual acuity: normal    CN III, IV, VI   Pupils are equal, round, and reactive to light.  Extraocular motions are normal.     CN V   Facial sensation intact.   Right corneal reflex: normal  Left corneal reflex: normal    CN VII   Facial expression full, symmetric.   Right facial weakness: none  Left facial weakness: none    CN VIII   Hearing: intact    CN IX, X   Palate: symmetric    CN XI   Right sternocleidomastoid strength: normal  Left sternocleidomastoid strength: normal    CN XII   Tongue: not atrophic  Tongue deviation: none    Motor Exam   Muscle bulk: normal  Right arm tone: normal  Left arm tone: normal  Right leg tone: normal  Left leg tone: normal    Strength   Strength 5/5 except as noted.     Sensory Exam   Light touch normal.     Gait, Coordination, and Reflexes     Gait  Gait: normal    Coordination   Finger to nose coordination: normal  Heel to shin coordination: normal    Reflexes   Right brachioradialis: 2+  Left brachioradialis: 2+  Right biceps: 2+  Left biceps: 2+  Right triceps: 2+  Left triceps: 2+  Right patellar: 2+  Left patellar: 2+  Right achilles: 2+  Left achilles: 2+  Right : 2+  Left : 2+          Assessment & Plan   Independent Review of Radiographic Studies:      I personally reviewed the images from the following studies.    I reviewed the cervical MRI done in March 2022 at [facility] which does show persistence of the osteophytic cervical disc disease at C5-C6.  A bit of progression at C6-C7 and a left C7-T1 disc bulge.  Agree with the report.    Medical Decision Making:      We [facility] go ahead [facility] to the left side of the neck and the trapezius.  We will try Robaxin and stop the Flexeril and have him come back in 3 months.  Think it is best to avoid epidural injections given his history and surgery as well as he seems to have mostly chronic spasming type of pain.      Diagnoses and all orders for this  visit:    1. Cervical disc disorder at C5-C6 level with radiculopathy (Primary)  -     Ambulatory Referral to Physical Therapy Evaluate and treat    2. Chronic neck pain  -     Ambulatory Referral to Physical Therapy Evaluate and treat    3. Cervical disc disorder at C6-C7 level with radiculopathy  -     Ambulatory Referral to Physical Therapy Evaluate and treat    4. Neck muscle spasm  -     Ambulatory Referral to Physical Therapy Evaluate and treat    Other orders  -     methocarbamol (ROBAXIN) 750 MG tablet; Take 1 tablet by mouth 2 (Two) Times a Day As Needed for Muscle Spasms.  Dispense: 60 tablet; Refill: 3      Return in about 3 months (around 10/11/2022) for face to face.

## 2022-07-27 ENCOUNTER — OFFICE (AMBULATORY)
Dept: URBAN - METROPOLITAN AREA CLINIC 75 | Facility: CLINIC | Age: 67
End: 2022-07-27

## 2022-07-27 VITALS
OXYGEN SATURATION: 93 % | DIASTOLIC BLOOD PRESSURE: 72 MMHG | HEART RATE: 59 BPM | HEIGHT: 68 IN | SYSTOLIC BLOOD PRESSURE: 116 MMHG | WEIGHT: 142 LBS

## 2022-07-27 DIAGNOSIS — K58.1 IRRITABLE BOWEL SYNDROME WITH CONSTIPATION: ICD-10-CM

## 2022-07-27 DIAGNOSIS — Z80.9 FAMILY HISTORY OF MALIGNANT NEOPLASM, UNSPECIFIED: ICD-10-CM

## 2022-07-27 DIAGNOSIS — K57.30 DIVERTICULOSIS OF LARGE INTESTINE WITHOUT PERFORATION OR ABS: ICD-10-CM

## 2022-07-27 DIAGNOSIS — Z86.010 PERSONAL HISTORY OF COLONIC POLYPS: ICD-10-CM

## 2022-07-27 DIAGNOSIS — K41.90: ICD-10-CM

## 2022-07-27 DIAGNOSIS — R10.31 RIGHT LOWER QUADRANT PAIN: ICD-10-CM

## 2022-07-27 DIAGNOSIS — R10.30 LOWER ABDOMINAL PAIN, UNSPECIFIED: ICD-10-CM

## 2022-07-27 DIAGNOSIS — R14.3 FLATULENCE: ICD-10-CM

## 2022-07-27 PROCEDURE — 99214 OFFICE O/P EST MOD 30 MIN: CPT | Performed by: NURSE PRACTITIONER

## 2022-08-03 ENCOUNTER — TREATMENT (OUTPATIENT)
Dept: PHYSICAL THERAPY | Facility: CLINIC | Age: 67
End: 2022-08-03

## 2022-08-03 DIAGNOSIS — M54.2 CHRONIC NECK PAIN: Primary | ICD-10-CM

## 2022-08-03 DIAGNOSIS — M50.123 CERVICAL DISC DISORDER AT C6-C7 LEVEL WITH RADICULOPATHY: ICD-10-CM

## 2022-08-03 DIAGNOSIS — M50.122 CERVICAL DISC DISORDER AT C5-C6 LEVEL WITH RADICULOPATHY: ICD-10-CM

## 2022-08-03 DIAGNOSIS — G89.29 CHRONIC NECK PAIN: Primary | ICD-10-CM

## 2022-08-03 DIAGNOSIS — M62.838 NECK MUSCLE SPASM: ICD-10-CM

## 2022-08-03 PROCEDURE — 97110 THERAPEUTIC EXERCISES: CPT | Performed by: PHYSICAL THERAPIST

## 2022-08-03 PROCEDURE — 97140 MANUAL THERAPY 1/> REGIONS: CPT | Performed by: PHYSICAL THERAPIST

## 2022-08-03 PROCEDURE — G0283 ELEC STIM OTHER THAN WOUND: HCPCS | Performed by: PHYSICAL THERAPIST

## 2022-08-03 PROCEDURE — 97530 THERAPEUTIC ACTIVITIES: CPT | Performed by: PHYSICAL THERAPIST

## 2022-08-03 PROCEDURE — 97162 PT EVAL MOD COMPLEX 30 MIN: CPT | Performed by: PHYSICAL THERAPIST

## 2022-08-03 NOTE — PROGRESS NOTES
Physical Therapy Initial Evaluation and Plan of Care    Patient Name: Kenneth Jean Baptiste          :  1955  Referring Physician: Mu Can MD  Diagnosis: Cervical disc disorder at C5-C6 level with radiculopathy [M50.122]  ;  Date of Evaluation: 2022  ______________________________________________________________________    Subjective Evaluation    History of Present Illness  Onset date: Long history of neck pain - getting worse -  Mechanism of injury: Insidious onset -   Feels like a wt pressing down on his neck -       Patient Occupation: [employer] -  Retired - Very active -  Pain  Current pain ratin  At best pain ratin  At worst pain ratin  Location: Neck/UT, Scap (L) -knot in UT and scap -  Occassionally int dorsal forearm and hands (R>L) - Shooting pain in RIF -   Alleviating factors: Roll on foam roller - excedrin, Tamadrol - Rest; Inversioin table   Exacerbated by: More activities - Looking down - Looking up; Heavy work -   Progression: worsening    Hand dominance: right    Diagnostic Tests  MRI studies: abnormal    Treatments  Previous treatment: physical therapy and injection treatment ([facility] - finished about 1 month ago - 6-8 weeks - No help - only exercises - No manual or tractioin tried)  Patient Goals  Patient/family treatment goals: Pain alleviation; Mobility to allow ADLs and hobby activites -          ___________________________________________________  Objective          Postural Observations    Additional Postural Observation Details  Sig fwd head posture posture - Hypertrophy supraclavicular region, UT (L)  Atrophy (L) Medial scap region - Winging scap (L) -          Tenderness     Additional Tenderness Details  Tender (L) UT, 1st rib, Scalenes ; Medial scap (R)     Active Range of Motion     Additional Active Range of Motion Details  C-spine: Flex 40-45-deg; Ext 30-deg w/ pain at end range  ROT: (R) 55-deg; (L) 40-deg   SB; 20-deg (B)     UE  WNL    Strength/Myotome Testing   Cervical Spine     Left   Normal strength    Right   Normal strength    Tests     Additional Tests Details  (-) C-compression;   Decreased symptoms w/ Manual distraction -  (-) Spurling test R/L  (+) Elevated 1st rib (L)         See Treatment Flow sheet for Exercises, Manual therapy, and modalities.   FUNCTIONAL ACTIVITIES:   · TAPING / BRACING: NA  · Anatomy / Dysfunction education  · Discussing using home inversion table more often, and home TENS unit on regular basis before pain gets too severe -   · Jt protection, ADL modification; Posture and  correction / instruction     ___________________________________________________  Assessment & Plan     Assessment    Assessment details: 68 yo male: Chronic (L) sided neck pain - C-radiculopathy;   MD ordered Dry needling, but I feel Mr. Jean Baptiste would benefit from extensive manualt herapy, Trial of cervical traction, etc- Despite having PT in past, I feel he would benefit from manual therapy incluidng 1st rib mobilization, etc and traction possible if radicular symptoms -   After first rib mobilization, Pt demonstrated much improved C-rotation L>R by at least 25-deg and less tightness and tenderness (L) UT region -     PROBLEMS: Pain; Limited mobility; Postural dysfunction; Intolerance to ADLs   PROGNOSIS: Good    GOALS:   SHORT TERM GOALS: 2 weeks:  1) HEP Initiated; 2) Pain decreased 50%:   3) AROM C-spine increased 10-de) Improved Postural awareness w/ cuing -; 5) Improved functional w/ taping - ;     LONG TERM GOALS: 4 weeks (or at time of DISCHARGE): 1) (I) HEP; 2) AROM WFL and pain free; 3) Strength / mobility to be able to perform all ADL's and job-related activities w/o restrictions; 4) Improved postural awareness w/ minimal cuing -       Plan  Planned therapy interventions: flexibility, home exercise program, manual therapy, neuromuscular re-education, postural training, soft tissue mobilization, spinal/joint  mobilization, strengthening, stretching and therapeutic activities (Modalities prn; Taping; traction prn; Dry Needling if no help from traditional treatments - )  Frequency: 2x week  Duration in weeks: 4  Treatment plan discussed with: patient  Plan details: Next session:  FOCUS ON 1) GENTLE STM to neck / UT; 2) Suboccipital release, Retractions mobs; 3) Initiate C-traction (intermittent) 10lbs to start -       ___________________________________________________  Manual Therapy:    25     mins  82057;  Therapeutic Exercise:    10     mins  08162;     Neuromuscular Francesco:        mins  67968;    Therapeutic Activity:     10     mins  12122;   Self Care:                           mins  78959  Ultrasound:     06     mins  95903;  Electrical Stimulation:    15     mins  25455 ( );  Mechanical Traction:          mins  93126    Eval:   20   mins    Timed Treatment:   51   mins                  Total Treatment:     75   mins    PT SIGNATURE:   Omar Hurt, PT  DATE TREATMENT INITIATED: 8/03/2022  ___________________________________________________  Initial Certification  Certification Period: 10/31/2022  I certify that the therapy services are furnished while this patient is under my care.  The services outlined above are required by this patient, and will be reviewed every 90 days.     PHYSICIAN: ________________________________  DATE: ______  Mu Can MD        Please sign and return via fax to [phone].. Thank you, [facility].  ______________________________________________________________________  [address] [place] [address]  Phone: [phone] Fax: [phone]

## 2022-08-04 NOTE — PROGRESS NOTES
Physical Therapy Daily Progress Note    Visit Diagnoses:    ICD-10-CM ICD-9-CM   1. Cervical disc disorder at C5-C6 level with radiculopathy  M50.122 722.91     723.4   2. Cervical disc disorder at C6-C7 level with radiculopathy  M50.123 722.91     723.4   3. Chronic neck pain  M54.2 723.1    G89.29 338.29       VISIT#: Martha Jean Baptiste reports: His neck is doing a little better. He is having a little less pain since last PT session.   Current Pain Level:    3/10; Worst:   6-7/10 just after a long day - evening increased pain; Best:  0/10 with little activity  Location Of Pain: Neck/UT, Scap (L) -knot in UT and scap -  Occassionally int dorsal forearm and hands (R>L) - Shooting pain in RIF   Quality of Pain: shooting, radiating, aching, numbness   Response to Previous Session: good. No issues     Functional Deficits/Irritating Factors: More activities - Looking down - Looking up; Heavy work   Progression: improving  Compliance with HEP Reported: Yes    Objective  Presents: Sig fwd head posture posture - Hypertrophy supraclavicular region, UT (L), B Rib flaring in supine  Atrophy (L) Medial scap region - Winging scap (L)  Increased sets/reps of:  none   Increased resistance on:  none  Added to Program: Scapular mobilization, B        See Exercise, Manual, and Modality Logs for complete treatment.     Patient Education: Pt was educated on exercise biomechanical correctness, intensity, and speed.    Assessment:  Pt very tight throughout his cervical and upper thoracic region. Large knot on medial superior border of R scapula. Tender in suboccipital area.  Pt will continue to benefit from skilled PT interventions to address current functional deficits and impairments.       Plan: Progress to/Continue with current program.         Manual Therapy:    25     mins  37232;  Ultrasound:   0/10 mins 61765  Electrical Stimulation: __15____ mins 84746/  Iontophoresis: 0 mins 05046  Traction: 0 mins  37452  Work  Conditionin mins 65678  Therapeutic Exercise:    15     mins  98019;     Neuromuscular Francesco:    0    mins  74892;    Therapeutic Activity:     0     mins  66615;     Timed Treatment:   40   mins   Total Treatment:     65   mins    Jenn Amin PTA  [place] License # [id number]  Physical Therapist Assistant

## 2022-08-05 ENCOUNTER — TREATMENT (OUTPATIENT)
Dept: PHYSICAL THERAPY | Facility: CLINIC | Age: 67
End: 2022-08-05

## 2022-08-05 DIAGNOSIS — M50.122 CERVICAL DISC DISORDER AT C5-C6 LEVEL WITH RADICULOPATHY: Primary | ICD-10-CM

## 2022-08-05 DIAGNOSIS — G89.29 CHRONIC NECK PAIN: ICD-10-CM

## 2022-08-05 DIAGNOSIS — M50.123 CERVICAL DISC DISORDER AT C6-C7 LEVEL WITH RADICULOPATHY: ICD-10-CM

## 2022-08-05 DIAGNOSIS — M54.2 CHRONIC NECK PAIN: ICD-10-CM

## 2022-08-05 PROCEDURE — 97140 MANUAL THERAPY 1/> REGIONS: CPT | Performed by: PHYSICAL THERAPIST

## 2022-08-05 PROCEDURE — G0283 ELEC STIM OTHER THAN WOUND: HCPCS | Performed by: PHYSICAL THERAPIST

## 2022-08-05 PROCEDURE — 97110 THERAPEUTIC EXERCISES: CPT | Performed by: PHYSICAL THERAPIST

## 2022-08-09 NOTE — PROGRESS NOTES
"Physical Therapy Daily Progress Note    Patient Name: Kenneth Jean Baptiste         :  1955  Referring Physician: Mu Can MD  Treatment Date: 8/10/2022  Date of Initial Visit: No linked episodes    Visit Diagnoses:    ICD-10-CM ICD-9-CM   1. Cervical disc disorder at C5-C6 level with radiculopathy  M50.122 722.91     723.4   2. Cervical disc disorder at C6-C7 level with radiculopathy  M50.123 722.91     723.4   3. Chronic neck pain  M54.2 723.1    G89.29 338.29   4. Neck muscle spasm  M62.838 728.85       _____________________________________________________    Subjective   Kenneth Jean Baptiste reports:increased pain base of head (R) and infrascap region on (R)   Chin tucks irritate base of head pain after causing a spasm -  Decreased pain in neck on (L) and improved mobility after last session, but persistent large mass on UT region that is painful at times, but not painful at others - not consistent,   He notes having an US on the mass, but it did not see anything -   Noted 1 yr ago Tinnitis (L) at night and tingling (L) head/ear  Vision goes blurry at night - Saw Neurologist - MRI brain - MS?       Objective   Pt demonstrating improved posturing of C-spine / upper quarter - much less tender lower Cspine and over 1st rib (L) and supraclavicular mm much less swollen/hypertrophied (L) -   Palpable \"mass\" central/lateral UT that appears to extend into suprascapular region - it appears to have distinct borders - does not feel like a muscle tightness / spasm but a mass -       See Exercise, Manual, and Modality Logs for complete treatment.     Functional / Therapeutic Activities:    · TAPING / BRACING:   · Discussed at length about having his mass re-assessed - for formal identification -   · Jt protection, ADL modification; Posture and      Assessment/Plan   68 yo male: Chronic (L) sided neck pain - C-radiculopathy;   MD ordered Dry needling, but I feel Mr. Jean Baptiste would benefit from extensive " manualt herapy, Trial of cervical traction, etc- Despite having PT in past, I feel he would benefit from manual therapy incluidng 1st rib mobilization, etc and traction possible if radicular symptoms -   After first rib mobilization, Pt demonstrated much improved C-rotation   Feel the mass within his (L) UT region should be re-assessed to be properly ID'ed before I would feel comfortable dry needling this mass blindly -  Tinnitis, ear burning, blurry vision at night? - further assessment required -      Progress per Plan of Care - trial cervical traction prn -       _________________________________________________    Manual Therapy:            08     mins  69743;  Therapeutic Exercise:    10    mins  94687;     Neuromuscular Francesco:        mins  27853;    Therapeutic Activity:     15      mins  61373;     Ultrasound:                          mins  42437;  Iontophoresis:                     mins  31340;    Electrical Stimulation:         mins  29087 ( );  Mechanical Traction:          mins  77302  Dry Needling                       mins self-pay     Timed Treatment:   33    mins                  Total Treatment:     35    mins        Omar Hurt, PT  Physical Therapist  Lic # [id number]

## 2022-08-10 ENCOUNTER — TREATMENT (OUTPATIENT)
Dept: PHYSICAL THERAPY | Facility: CLINIC | Age: 67
End: 2022-08-10

## 2022-08-10 DIAGNOSIS — M62.838 NECK MUSCLE SPASM: ICD-10-CM

## 2022-08-10 DIAGNOSIS — M50.122 CERVICAL DISC DISORDER AT C5-C6 LEVEL WITH RADICULOPATHY: Primary | ICD-10-CM

## 2022-08-10 DIAGNOSIS — G89.29 CHRONIC NECK PAIN: ICD-10-CM

## 2022-08-10 DIAGNOSIS — M50.123 CERVICAL DISC DISORDER AT C6-C7 LEVEL WITH RADICULOPATHY: ICD-10-CM

## 2022-08-10 DIAGNOSIS — M54.2 CHRONIC NECK PAIN: ICD-10-CM

## 2022-08-10 PROCEDURE — 97110 THERAPEUTIC EXERCISES: CPT | Performed by: PHYSICAL THERAPIST

## 2022-08-10 PROCEDURE — 97530 THERAPEUTIC ACTIVITIES: CPT | Performed by: PHYSICAL THERAPIST

## 2022-08-11 ENCOUNTER — TELEPHONE (OUTPATIENT)
Dept: NEUROSURGERY | Facility: CLINIC | Age: 67
End: 2022-08-11

## 2022-08-11 DIAGNOSIS — G89.29 CHRONIC NECK PAIN: Primary | ICD-10-CM

## 2022-08-11 DIAGNOSIS — M50.123 CERVICAL DISC DISORDER AT C6-C7 LEVEL WITH RADICULOPATHY: ICD-10-CM

## 2022-08-11 DIAGNOSIS — M62.838 NECK MUSCLE SPASM: ICD-10-CM

## 2022-08-11 DIAGNOSIS — M54.2 CHRONIC NECK PAIN: Primary | ICD-10-CM

## 2022-08-11 NOTE — TELEPHONE ENCOUNTER
Patient called and states that his physical therapist wants him to have a second opinion with Dr. Jt Bird (sports Medicine) for his ultrasound. Patient would like the referral faxed to F#[phone]

## 2022-08-16 ENCOUNTER — TREATMENT (OUTPATIENT)
Dept: PHYSICAL THERAPY | Facility: CLINIC | Age: 67
End: 2022-08-16

## 2022-08-16 DIAGNOSIS — M62.838 NECK MUSCLE SPASM: ICD-10-CM

## 2022-08-16 DIAGNOSIS — M54.2 CHRONIC NECK PAIN: ICD-10-CM

## 2022-08-16 DIAGNOSIS — M50.122 CERVICAL DISC DISORDER AT C5-C6 LEVEL WITH RADICULOPATHY: Primary | ICD-10-CM

## 2022-08-16 DIAGNOSIS — M50.123 CERVICAL DISC DISORDER AT C6-C7 LEVEL WITH RADICULOPATHY: ICD-10-CM

## 2022-08-16 DIAGNOSIS — M79.18 MYOFASCIAL PAIN: ICD-10-CM

## 2022-08-16 DIAGNOSIS — G89.29 CHRONIC NECK PAIN: ICD-10-CM

## 2022-08-16 PROCEDURE — 97140 MANUAL THERAPY 1/> REGIONS: CPT | Performed by: PHYSICAL THERAPIST

## 2022-08-16 PROCEDURE — 97530 THERAPEUTIC ACTIVITIES: CPT | Performed by: PHYSICAL THERAPIST

## 2022-08-16 PROCEDURE — G0283 ELEC STIM OTHER THAN WOUND: HCPCS | Performed by: PHYSICAL THERAPIST

## 2022-08-16 NOTE — PROGRESS NOTES
Physical Therapy Daily Progress Note    Patient Name: Kenneth Jean Baptiste         :  1955  Referring Physician: Mu Can MD  Treatment Date: 2022  Date of Initial Visit: No linked episodes    Visit Diagnoses:    ICD-10-CM ICD-9-CM   1. Cervical disc disorder at C5-C6 level with radiculopathy  M50.122 722.91     723.4   2. Cervical disc disorder at C6-C7 level with radiculopathy  M50.123 722.91     723.4   3. Chronic neck pain  M54.2 723.1    G89.29 338.29   4. Neck muscle spasm  M62.838 728.85       _____________________________________________________    Subjective   Kenneth Jean Baptiste reports: tried to see Dr. Bird - had to get a referral - not to see him for a while  Not any better - Pain (R) neck and head and (R) upper / mid back - Pain constant at knot and surrounding area -  Still having ringing in ear and pain at night - seen ENT, Neurologist -     Objective   Tender along TPS T3-6 and Cspine C4-6 (R) w/ painful trigger points -   Apparent Scoliosis to (L) lower neck / upper Tspine -     See Exercise, Manual, and Modality Logs for complete treatment.     Functional / Therapeutic Activities:    · TAPING / BRACING: K-tape to Unload (R) TPS  · Assessment of neck/upper back -   · Pt advised to stop HEP since he is having more pain since starting his HEP   · Jt protection, ADL modification; Posture and      Assessment/Plan  68 yo male: Chronic (L) sided neck pain - C-radiculopathy;   MD ordered Dry needling, but I feel Mr. Jean Baptiste would benefit from extensive manualt herapy, Trial of cervical traction, etc- Despite having PT in past, I feel he would benefit from manual therapy incluidng 1st rib mobilization, etc and traction possible if radicular symptoms -   After first rib mobilization, Pt demonstrated much improved C-rotation   Feel the mass within his (L) UT region should be re-assessed to be properly ID'ed before I would feel comfortable dry needling this mass blindly  -  Tinnitis, ear burning, blurry vision at night? - further assessment required -    Pain in (R) Neck / upper back since starting HEP - felt much better after MT    Progress per Plan of Care - modify as symptoms change -        _________________________________________________    Manual Therapy:            25     mins  89423;  Therapeutic Exercise:        mins  32580;     Neuromuscular Francesco:        mins  88069;    Therapeutic Activity:     08      mins  32368;     Ultrasound:                    06      mins  07758;  Iontophoresis:                     mins  94243;    Electrical Stimulation:     15    mins  61426 ( );  Mechanical Traction:          mins  93069  Dry Needling                       mins self-pay     Timed Treatment:   39    mins                  Total Treatment:     60    mins      Omar Hurt, PT  Physical Therapist  Lic # [id number]

## 2022-08-18 ENCOUNTER — TREATMENT (OUTPATIENT)
Dept: PHYSICAL THERAPY | Facility: CLINIC | Age: 67
End: 2022-08-18

## 2022-08-18 DIAGNOSIS — M79.18 MYOFASCIAL PAIN: ICD-10-CM

## 2022-08-18 DIAGNOSIS — M50.122 CERVICAL DISC DISORDER AT C5-C6 LEVEL WITH RADICULOPATHY: Primary | ICD-10-CM

## 2022-08-18 DIAGNOSIS — M54.2 CHRONIC NECK PAIN: ICD-10-CM

## 2022-08-18 DIAGNOSIS — G89.29 CHRONIC NECK PAIN: ICD-10-CM

## 2022-08-18 DIAGNOSIS — M62.838 NECK MUSCLE SPASM: ICD-10-CM

## 2022-08-18 DIAGNOSIS — M50.123 CERVICAL DISC DISORDER AT C6-C7 LEVEL WITH RADICULOPATHY: ICD-10-CM

## 2022-08-18 PROCEDURE — 97530 THERAPEUTIC ACTIVITIES: CPT | Performed by: PHYSICAL THERAPIST

## 2022-08-18 PROCEDURE — 97035 APP MDLTY 1+ULTRASOUND EA 15: CPT | Performed by: PHYSICAL THERAPIST

## 2022-08-18 PROCEDURE — 97140 MANUAL THERAPY 1/> REGIONS: CPT | Performed by: PHYSICAL THERAPIST

## 2022-08-18 PROCEDURE — 97012 MECHANICAL TRACTION THERAPY: CPT | Performed by: PHYSICAL THERAPIST

## 2022-08-18 NOTE — PROGRESS NOTES
Physical Therapy Daily Progress Note    Patient Name: Kenneth Jean Baptiste         :  1955  Referring Physician: Mu Can MD  Treatment Date: 2022  Date of Initial Visit: Type: THERAPY  Noted: 8/3/2022    Visit Diagnoses:    ICD-10-CM ICD-9-CM   1. Cervical disc disorder at C5-C6 level with radiculopathy  M50.122 722.91     723.4   2. Cervical disc disorder at C6-C7 level with radiculopathy  M50.123 722.91     723.4   3. Chronic neck pain  M54.2 723.1    G89.29 338.29   4. Neck muscle spasm  M62.838 728.85   5. Myofascial pain  M79.18 729.1       _____________________________________________________    Subjective   Kenneth Jean Baptiste reports: decreased (R) neck pain, but more sore in (L) UT/neck   Notes intermittent random pain/shocks in forearm, hands, fingers - no pattern, only short distances - no pattern - At times he can't close his hands due to pain/numbness - again, no pattern -   Told no lesions on brain to R/O MS -   Notes no improvement w/ PT -   To see Dr. Bird Wed.     Objective   Tender over 1st rib, med scap upper and over mass in (L) UT into suprascapular region (L)  (-) Tinesls RT, CT, Cub Ventura, PT (B) UEs -   Trial C-traction  Pt was demonstrating improved posturing w/ use of his Cell Phone -       See Exercise, Manual, and Modality Logs for complete treatment. (no TE due to increased pain w/ HEP)     Functional / Therapeutic Activities:    · TAPING / BRACING: NA  · Extensive review of symptoms, discussion of options,   · Jt protection, ADL modification; Posture     Assessment/Plan  66 yo male: Chronic (L) sided neck pain - C-radiculopathy;   MD ordered Dry needling, but I feel Mr. Jean Baptiste would benefit from extensive manualt herapy, Trial of cervical traction, etc- Despite having PT in past, I feel he would benefit from manual therapy incluidng 1st rib mobilization, etc and traction possible if radicular symptoms -   After first rib mobilization, Pt demonstrated much improved  C-rotation   Feel the mass within his (L) UT region should be re-assessed to be properly ID'ed before I would feel comfortable dry needling this mass blindly -  Tinnitis, ear burning, blurry vision at night? -   Random shootin/burining/numbness in arms, fingers, hand w/ no dermatome pattern - no consistency - random not being able to close his hands w/ no pattern -  further assessment required - To see Dr. Bird Wed.      P: DC PT for now until further assessment to assess the mass in UT region, dx for the issues noted above - no improvement w/ PT -        _________________________________________________    Manual Therapy:            15     mins  96972;  Therapeutic Exercise:        mins  92501;     Neuromuscular Francesco:        mins  99188;    Therapeutic Activity:     20      mins  38067;     Ultrasound:                    06      mins  65315;  Iontophoresis:                     mins  06752;    Electrical Stimulation:         mins  38868 ( );  Mechanical Traction:      15    mins  62884  Dry Needling                       mins self-pay     Timed Treatment:   41    mins                  Total Treatment:     60    mins        Omar Hurt, PT  Physical Therapist  Lic # [id number]

## 2022-08-24 ENCOUNTER — OFFICE VISIT (OUTPATIENT)
Dept: SPORTS MEDICINE | Facility: CLINIC | Age: 67
End: 2022-08-24

## 2022-08-24 VITALS
HEART RATE: 93 BPM | BODY MASS INDEX: 21.82 KG/M2 | OXYGEN SATURATION: 98 % | WEIGHT: 144 LBS | DIASTOLIC BLOOD PRESSURE: 70 MMHG | SYSTOLIC BLOOD PRESSURE: 132 MMHG | TEMPERATURE: 98.6 F | HEIGHT: 68 IN

## 2022-08-24 DIAGNOSIS — R22.1 MASS OF LEFT SIDE OF NECK: ICD-10-CM

## 2022-08-24 DIAGNOSIS — M50.123 CERVICAL DISC DISORDER AT C6-C7 LEVEL WITH RADICULOPATHY: Primary | ICD-10-CM

## 2022-08-24 PROCEDURE — 99204 OFFICE O/P NEW MOD 45 MIN: CPT | Performed by: FAMILY MEDICINE

## 2022-08-31 ENCOUNTER — TELEPHONE (OUTPATIENT)
Dept: SPORTS MEDICINE | Facility: CLINIC | Age: 67
End: 2022-08-31

## 2022-08-31 DIAGNOSIS — M25.812 MASS OF JOINT OF LEFT SHOULDER: Primary | ICD-10-CM

## 2022-08-31 NOTE — TELEPHONE ENCOUNTER
Caller: Kenneth Jean Baptiste    Relationship: Self    Best call back number:     What is the best time to reach you: ANY     Who are you requesting to speak with (clinical staff, provider,  specific staff member): CLINICAL    What was the call regarding: PATIENT WAS SEEN A WEEK AGO AND WAS TOLD ABOUT MRI BUT HASNT HEARD BACK SINCE.  PLEASE CONTACT HIM     Do you require a callback: YES

## 2022-09-28 ENCOUNTER — HOSPITAL ENCOUNTER (OUTPATIENT)
Dept: MRI IMAGING | Facility: HOSPITAL | Age: 67
Discharge: HOME | End: 2022-09-28
Admitting: FAMILY MEDICINE

## 2022-09-28 DIAGNOSIS — M25.812 MASS OF JOINT OF LEFT SHOULDER: ICD-10-CM

## 2022-09-28 PROCEDURE — 73223 MRI JOINT UPR EXTR W/O&W/DYE: CPT

## 2022-09-28 PROCEDURE — A9577 INJ MULTIHANCE: HCPCS | Performed by: FAMILY MEDICINE

## 2022-09-28 PROCEDURE — 0 GADOBENATE DIMEGLUMINE 529 MG/ML SOLUTION: Performed by: FAMILY MEDICINE

## 2022-09-28 RX ADMIN — GADOBENATE DIMEGLUMINE 14 ML: 529 INJECTION, SOLUTION INTRAVENOUS at 15:06

## 2022-10-10 DIAGNOSIS — M25.812 MASS OF JOINT OF LEFT SHOULDER: Primary | ICD-10-CM

## 2022-10-18 ENCOUNTER — TRANSCRIBE ORDERS (OUTPATIENT)
Dept: ADMINISTRATIVE | Facility: HOSPITAL | Age: 67
End: 2022-10-18

## 2022-10-18 DIAGNOSIS — R22.32 MASS OF SKIN OF LEFT SHOULDER: Primary | ICD-10-CM

## 2022-10-26 NOTE — PROGRESS NOTES
Subjective   Patient ID: Kenneth Jean Baptiste is a 67 y.o. male is here today for follow-up after physical therapy that was ordered for left sided neck pain left sided trapezius pain.  MRI shoulder done on 9/28/22.  CT chest done on 10/28/22.    Today, Mr. Jean Baptiste reports dry needling was not done at physical therapy.  He is still c/o neck pain  That radiates down bilateral arms to hands with numbness.  He is also c/o back pain that does not radiate.    This patient was seen by me about 3 months ago and I attributed a lot of his symptoms to his cervical disc disease.  Therapy and the muscle relaxants were tried but did not do a lot.  His primary care physician sent him to Dr. 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  He is supposed to see a Dr. Manning from the [facility] who I believe is a thoracic surgeon.  The mass could potentially be the reason for a lot of the pain although it is hard to say for sure.  1 can certainly feel a hard area of trapezius muscle which I attributed to spasm but could be related to this mass.  It may need to be biopsied.  I will leave that up to the discretion of Dr. Manning.  We will put on hold the treatment of the cervical problems and see him in 3 months.  I asked the patient to let Dr. Manning know to send me a copy of his notes.    History of Present Illness    The following portions of the patient's history were reviewed and updated as appropriate: allergies, current medications, past family history, past medical history, past social history, past surgical history and problem list.    Review of Systems   Constitutional: Negative for fever.   Gastrointestinal:        No b/b incontinence   Genitourinary: Negative for difficulty urinating and enuresis.   Musculoskeletal: Positive for back pain and neck pain (radiates down bilateral arms and hands).   Neurological: Positive for  "numbness (fingers). Negative for weakness.   Psychiatric/Behavioral: Positive for sleep disturbance.   All other systems reviewed and are negative.          Objective     Vitals:    10/31/22 1255   BP: 110/76   Pulse: 85   Resp: 20   Temp: 97.5 °F (36.4 °C)   SpO2: 97%   Weight: 65.3 kg (144 lb)   Height: 172.7 cm (68\")     Body mass index is 21.9 kg/m².    Tobacco Use: Low Risk    • Smoking Tobacco Use: Never   • Smokeless Tobacco Use: Never   • Passive Exposure: Not on file          Physical Exam  Constitutional:       Appearance: He is well-developed.   HENT:      Head: Normocephalic and atraumatic.   Eyes:      Extraocular Movements: EOM normal.      Conjunctiva/sclera: Conjunctivae normal.      Pupils: Pupils are equal, round, and reactive to light.   Neck:      Vascular: No carotid bruit.   Neurological:      Mental Status: He is oriented to person, place, and time.      Coordination: Finger-Nose-Finger Test and Heel to Shin Test normal.      Gait: Gait is intact.      Deep Tendon Reflexes:      Reflex Scores:       Tricep reflexes are 2+ on the right side and 2+ on the left side.       Bicep reflexes are 2+ on the right side and 2+ on the left side.       Brachioradialis reflexes are 2+ on the right side and 2+ on the left side.       Patellar reflexes are 2+ on the right side and 2+ on the left side.       Achilles reflexes are 2+ on the right side and 2+ on the left side.  Psychiatric:         Speech: Speech normal.       Neurologic Exam     Mental Status   Oriented to person, place, and time.   Registration of memory: Good recent and remote memory.   Attention: normal. Concentration: normal.   Speech: speech is normal   Level of consciousness: alert  Knowledge: consistent with education.     Cranial Nerves     CN II   Visual fields full to confrontation.   Visual acuity: normal    CN III, IV, VI   Pupils are equal, round, and reactive to light.  Extraocular motions are normal.     CN V   Facial sensation " intact.   Right corneal reflex: normal  Left corneal reflex: normal    CN VII   Facial expression full, symmetric.   Right facial weakness: none  Left facial weakness: none    CN VIII   Hearing: intact    CN IX, X   Palate: symmetric    CN XI   Right sternocleidomastoid strength: normal  Left sternocleidomastoid strength: normal    CN XII   Tongue: not atrophic  Tongue deviation: none    Motor Exam   Muscle bulk: normal  Right arm tone: normal  Left arm tone: normal  Right leg tone: normal  Left leg tone: normal    Strength   Strength 5/5 except as noted.     Sensory Exam   Light touch normal.     Gait, Coordination, and Reflexes     Gait  Gait: normal    Coordination   Finger to nose coordination: normal  Heel to shin coordination: normal    Reflexes   Right brachioradialis: 2+  Left brachioradialis: 2+  Right biceps: 2+  Left biceps: 2+  Right triceps: 2+  Left triceps: 2+  Right patellar: 2+  Left patellar: 2+  Right achilles: 2+  Left achilles: 2+  Right : 2+  Left : 2+          Assessment & Plan   Independent Review of Radiographic Studies:      I personally reviewed the images from the following studies.    A shoulder MRI done on 9/28/2022 showed a 2.5 cm mass interposed between the left posterior third rib and the superior angle of the scapula.  There did not seem to be any central enhancement.  The radiologist raised the possibility of a scapulothoracic bursitis.  Agree with the report.        Medical Decision Making:       He is going to be seeing  next week.  I will see him in 3 months.  He will let me know if the mass is going to be biopsied.  We will put on hold any treatment for the cervical spine at the present time given the finding of this mass.      Diagnoses and all orders for this visit:    1. Scapular dysfunction (Primary)    2. Abnormal MRI, shoulder    3. Chronic neck pain    4. Neck muscle spasm    5. Cervical disc disorder at C6-C7 level with radiculopathy    6. Cervical disc  disorder at C5-C6 level with radiculopathy      Return in about 3 months (around 1/31/2023) for face to face.

## 2022-10-28 ENCOUNTER — HOSPITAL ENCOUNTER (OUTPATIENT)
Dept: CT IMAGING | Facility: HOSPITAL | Age: 67
Discharge: HOME | End: 2022-10-28
Admitting: FAMILY MEDICINE

## 2022-10-28 DIAGNOSIS — R22.32 MASS OF SKIN OF LEFT SHOULDER: ICD-10-CM

## 2022-10-28 LAB — CREAT BLDA-MCNC: 1.2 MG/DL (ref 0.6–1.3)

## 2022-10-28 PROCEDURE — 82565 ASSAY OF CREATININE: CPT

## 2022-10-28 PROCEDURE — 25010000002 IOPAMIDOL 61 % SOLUTION: Performed by: FAMILY MEDICINE

## 2022-10-28 PROCEDURE — 71260 CT THORAX DX C+: CPT

## 2022-10-28 RX ADMIN — IOPAMIDOL 85 ML: 612 INJECTION, SOLUTION INTRAVENOUS at 15:50

## 2022-10-31 ENCOUNTER — OFFICE VISIT (OUTPATIENT)
Dept: NEUROSURGERY | Facility: CLINIC | Age: 67
End: 2022-10-31

## 2022-10-31 VITALS
DIASTOLIC BLOOD PRESSURE: 76 MMHG | WEIGHT: 144 LBS | SYSTOLIC BLOOD PRESSURE: 110 MMHG | HEART RATE: 85 BPM | BODY MASS INDEX: 21.82 KG/M2 | TEMPERATURE: 97.5 F | RESPIRATION RATE: 20 BRPM | HEIGHT: 68 IN | OXYGEN SATURATION: 97 %

## 2022-10-31 DIAGNOSIS — M50.123 CERVICAL DISC DISORDER AT C6-C7 LEVEL WITH RADICULOPATHY: ICD-10-CM

## 2022-10-31 DIAGNOSIS — M54.2 CHRONIC NECK PAIN: ICD-10-CM

## 2022-10-31 DIAGNOSIS — M50.122 CERVICAL DISC DISORDER AT C5-C6 LEVEL WITH RADICULOPATHY: ICD-10-CM

## 2022-10-31 DIAGNOSIS — M62.838 NECK MUSCLE SPASM: ICD-10-CM

## 2022-10-31 DIAGNOSIS — M89.9 SCAPULAR DYSFUNCTION: Primary | ICD-10-CM

## 2022-10-31 DIAGNOSIS — R93.6 ABNORMAL MRI, SHOULDER: ICD-10-CM

## 2022-10-31 DIAGNOSIS — G89.29 CHRONIC NECK PAIN: ICD-10-CM

## 2022-10-31 PROCEDURE — 99214 OFFICE O/P EST MOD 30 MIN: CPT | Performed by: NEUROLOGICAL SURGERY

## 2022-10-31 RX ORDER — TAMSULOSIN HYDROCHLORIDE 0.4 MG/1
1 CAPSULE ORAL DAILY
COMMUNITY

## 2023-01-10 NOTE — PROGRESS NOTES
Subjective   Patient ID: Kenneth Jean Baptiste is a 67 y.o. male is here today for follow-up. Mr. Jean Baptiste was last seen on 10-31-22 with complaints of neck pain that radiates down bilateral arms and hands with numbness.    Today, Mr. Jean Baptiste reports    Gentleman has several year history of neck pain and bilateral arm pain and interscapular pain.  Last time I saw him he was planning on seeing Dr. Ochoa one of the thoracic surgeons in the [facility] about the possibility of a snapping scapular syndrome.  Apparently he did not think that was the case and did not think a biopsy of this 2.5 cm mass between the left third rib and the superior angle of the scapula needed to be biopsied.  In any event he does have neck pain and bilateral arm pain interscapular pain.  As far as symptoms go, that could be explained by the known cervical stenosis that he has.  We have been trying to treat him nonoperatively with blocks and physical therapy and that has not really seem to work.  He says he has developed some psoriatic lesions on his head and nodules in his joints and is concerned about the possibility of psoriatic arthritis.  He has not been evaluated by a rheumatologist and is trying to contact his primary care physician about a referral.  I told him he would probably need to have some routine rheumatoid labs which his PCP can order once he gets in touch with them.  Out of the gets unreasonable to repeat the cervical MRI and x-rays.  We are in the process of failing with conservative treatment and while I told him I was not pushing this, surgical treatment is the backup option.  He is willing to look to see if there is been any progression since last year to account for his worsening and so we will get a new MRI and x-rays and have him come back to see me.        Neck Pain   This is a chronic problem. The current episode started more than 1 month ago. The problem occurs daily. The problem has been rapidly worsening. The pain  "is present in the left side. The quality of the pain is described as aching, burning, cramping, shooting and stabbing. The pain is at a severity of 9/10. The pain is severe. Nothing aggravates the symptoms. The pain is same all the time. Stiffness is present all day. Associated symptoms include leg pain (right and left leg ), numbness (fingers ), tingling (left leg below knee ), trouble swallowing and weakness. Pertinent negatives include no chest pain, fever or headaches. He has tried heat, home exercises and ice for the symptoms. The treatment provided mild relief.       The following portions of the patient's history were reviewed and updated as appropriate: allergies, current medications, past family history, past medical history, past social history, past surgical history and problem list.    Review of Systems   Constitutional: Positive for fatigue. Negative for fever.   HENT: Positive for ear pain, tinnitus and trouble swallowing.    Eyes: Positive for pain.   Respiratory: Negative for chest tightness and shortness of breath.    Cardiovascular: Negative for chest pain.   Gastrointestinal: Positive for abdominal pain. Negative for nausea and vomiting.   Musculoskeletal: Positive for back pain, gait problem and neck pain.   Neurological: Positive for tingling (left leg below knee ), weakness and numbness (fingers ). Negative for dizziness, light-headedness and headaches.   All other systems reviewed and are negative.          Objective     Vitals:    01/11/23 1041   BP: 132/70   Pulse: 72   Temp: 97.1 °F (36.2 °C)   SpO2: 99%   Weight: 67.6 kg (149 lb)   Height: 172.7 cm (67.99\")     Body mass index is 22.66 kg/m².    Tobacco Use: Low Risk    • Smoking Tobacco Use: Never   • Smokeless Tobacco Use: Never   • Passive Exposure: Not on file          Physical Exam  Constitutional:       Appearance: He is well-developed.   HENT:      Head: Normocephalic and atraumatic.   Eyes:      Extraocular Movements: EOM normal. "      Conjunctiva/sclera: Conjunctivae normal.      Pupils: Pupils are equal, round, and reactive to light.   Neck:      Vascular: No carotid bruit.   Neurological:      Mental Status: He is oriented to person, place, and time.      Coordination: Finger-Nose-Finger Test and Heel to Shin Test normal.      Gait: Gait is intact.      Deep Tendon Reflexes:      Reflex Scores:       Tricep reflexes are 2+ on the right side and 2+ on the left side.       Bicep reflexes are 2+ on the right side and 2+ on the left side.       Brachioradialis reflexes are 2+ on the right side and 2+ on the left side.       Patellar reflexes are 2+ on the right side and 2+ on the left side.       Achilles reflexes are 2+ on the right side and 2+ on the left side.  Psychiatric:         Speech: Speech normal.       Neurologic Exam     Mental Status   Oriented to person, place, and time.   Registration of memory: Good recent and remote memory.   Attention: normal. Concentration: normal.   Speech: speech is normal   Level of consciousness: alert  Knowledge: consistent with education.     Cranial Nerves     CN II   Visual fields full to confrontation.   Visual acuity: normal    CN III, IV, VI   Pupils are equal, round, and reactive to light.  Extraocular motions are normal.     CN V   Facial sensation intact.   Right corneal reflex: normal  Left corneal reflex: normal    CN VII   Facial expression full, symmetric.   Right facial weakness: none  Left facial weakness: none    CN VIII   Hearing: intact    CN IX, X   Palate: symmetric    CN XI   Right sternocleidomastoid strength: normal  Left sternocleidomastoid strength: normal    CN XII   Tongue: not atrophic  Tongue deviation: none    Motor Exam   Muscle bulk: normal  Right arm tone: normal  Left arm tone: normal  Right leg tone: normal  Left leg tone: normal    Strength   Strength 5/5 except as noted.     Sensory Exam   Light touch normal.     Gait, Coordination, and Reflexes     Gait  Gait:  normal    Coordination   Finger to nose coordination: normal  Heel to shin coordination: normal    Reflexes   Right brachioradialis: 2+  Left brachioradialis: 2+  Right biceps: 2+  Left biceps: 2+  Right triceps: 2+  Left triceps: 2+  Right patellar: 2+  Left patellar: 2+  Right achilles: 2+  Left achilles: 2+  Right : 2+  Left : 2+          Assessment & Plan   Independent Review of Radiographic Studies:      I personally reviewed the images from the following studies.    I reviewed the cervical MRI done in March 2022 at [facility] which does show persistence of the osteophytic cervical disc disease at C5-C6.  A bit of progression at C6-C7 and a left C7-T1 disc bulge.  Agree with the report.    Medical Decision Making:      We will go ahead and repeat the cervical MRI and x-rays and have him come back to see me.  In the meantime he will work with his primary care physician about getting a referral to see a rheumatologist.  I told him I was not pushing for him to have any kind of surgery but I do think the majority of his neck, interscapular symptoms and arm symptoms are coming from the cervical spine.      Diagnoses and all orders for this visit:    1. Abnormal MRI, shoulder (Primary)    2. Chronic neck pain  -     MRI Cervical Spine Without Contrast; Future  -     XR spine cervical ap and lat w flex and ext; Future    3. Cervical disc disorder at C6-C7 level with radiculopathy  -     MRI Cervical Spine Without Contrast; Future  -     XR spine cervical ap and lat w flex and ext; Future    4. Cervical disc disorder at C5-C6 level with radiculopathy  -     MRI Cervical Spine Without Contrast; Future  -     XR spine cervical ap and lat w flex and ext; Future      Return in about 4 weeks (around 2/8/2023) for After MRI and x-rays.

## 2023-01-11 ENCOUNTER — OFFICE VISIT (OUTPATIENT)
Dept: NEUROSURGERY | Facility: CLINIC | Age: 68
End: 2023-01-11
Payer: MEDICARE

## 2023-01-11 VITALS
SYSTOLIC BLOOD PRESSURE: 132 MMHG | DIASTOLIC BLOOD PRESSURE: 70 MMHG | HEIGHT: 68 IN | OXYGEN SATURATION: 99 % | TEMPERATURE: 97.1 F | BODY MASS INDEX: 22.58 KG/M2 | HEART RATE: 72 BPM | WEIGHT: 149 LBS

## 2023-01-11 DIAGNOSIS — M54.2 CHRONIC NECK PAIN: ICD-10-CM

## 2023-01-11 DIAGNOSIS — R93.6 ABNORMAL MRI, SHOULDER: Primary | ICD-10-CM

## 2023-01-11 DIAGNOSIS — M50.123 CERVICAL DISC DISORDER AT C6-C7 LEVEL WITH RADICULOPATHY: ICD-10-CM

## 2023-01-11 DIAGNOSIS — M50.122 CERVICAL DISC DISORDER AT C5-C6 LEVEL WITH RADICULOPATHY: ICD-10-CM

## 2023-01-11 DIAGNOSIS — G89.29 CHRONIC NECK PAIN: ICD-10-CM

## 2023-01-11 PROCEDURE — 99214 OFFICE O/P EST MOD 30 MIN: CPT | Performed by: NEUROLOGICAL SURGERY

## 2023-01-31 ENCOUNTER — APPOINTMENT (OUTPATIENT)
Dept: OTHER | Facility: HOSPITAL | Age: 68
End: 2023-01-31
Payer: MEDICARE

## 2023-01-31 ENCOUNTER — HOSPITAL ENCOUNTER (OUTPATIENT)
Dept: GENERAL RADIOLOGY | Facility: HOSPITAL | Age: 68
Discharge: HOME | End: 2023-01-31
Payer: MEDICARE

## 2023-01-31 ENCOUNTER — HOSPITAL ENCOUNTER (OUTPATIENT)
Dept: MRI IMAGING | Facility: HOSPITAL | Age: 68
Discharge: HOME | End: 2023-01-31
Payer: MEDICARE

## 2023-01-31 DIAGNOSIS — G89.29 CHRONIC NECK PAIN: ICD-10-CM

## 2023-01-31 DIAGNOSIS — M50.123 CERVICAL DISC DISORDER AT C6-C7 LEVEL WITH RADICULOPATHY: ICD-10-CM

## 2023-01-31 DIAGNOSIS — Z09 FOLLOW-UP EXAM: ICD-10-CM

## 2023-01-31 DIAGNOSIS — M50.122 CERVICAL DISC DISORDER AT C5-C6 LEVEL WITH RADICULOPATHY: ICD-10-CM

## 2023-01-31 DIAGNOSIS — M54.2 CHRONIC NECK PAIN: ICD-10-CM

## 2023-01-31 PROCEDURE — 72050 X-RAY EXAM NECK SPINE 4/5VWS: CPT

## 2023-01-31 PROCEDURE — 72141 MRI NECK SPINE W/O DYE: CPT

## 2023-03-20 ENCOUNTER — OFFICE VISIT (OUTPATIENT)
Dept: NEUROSURGERY | Facility: CLINIC | Age: 68
End: 2023-03-20
Payer: MEDICARE

## 2023-03-20 VITALS — DIASTOLIC BLOOD PRESSURE: 64 MMHG | OXYGEN SATURATION: 99 % | SYSTOLIC BLOOD PRESSURE: 138 MMHG | HEART RATE: 77 BPM

## 2023-03-20 DIAGNOSIS — M50.123 CERVICAL DISC DISORDER AT C6-C7 LEVEL WITH RADICULOPATHY: ICD-10-CM

## 2023-03-20 DIAGNOSIS — M62.838 NECK MUSCLE SPASM: ICD-10-CM

## 2023-03-20 DIAGNOSIS — M48.02 CERVICAL SPINAL STENOSIS: ICD-10-CM

## 2023-03-20 DIAGNOSIS — M54.2 CHRONIC NECK PAIN: Primary | ICD-10-CM

## 2023-03-20 DIAGNOSIS — G89.29 CHRONIC NECK PAIN: Primary | ICD-10-CM

## 2023-03-20 DIAGNOSIS — M50.122 CERVICAL DISC DISORDER AT C5-C6 LEVEL WITH RADICULOPATHY: ICD-10-CM

## 2023-03-20 PROCEDURE — 1160F RVW MEDS BY RX/DR IN RCRD: CPT | Performed by: NEUROLOGICAL SURGERY

## 2023-03-20 PROCEDURE — 99214 OFFICE O/P EST MOD 30 MIN: CPT | Performed by: NEUROLOGICAL SURGERY

## 2023-03-20 PROCEDURE — 1159F MED LIST DOCD IN RCRD: CPT | Performed by: NEUROLOGICAL SURGERY

## 2023-03-20 RX ORDER — METHYLPREDNISOLONE 4 MG/1
TABLET ORAL
COMMUNITY
Start: 2023-02-01

## 2023-03-20 RX ORDER — MELOXICAM 15 MG/1
1 TABLET ORAL DAILY PRN
COMMUNITY
Start: 2023-03-07 | End: 2024-03-06

## 2023-05-08 ENCOUNTER — ANESTHESIA (OUTPATIENT)
Dept: PAIN MEDICINE | Facility: HOSPITAL | Age: 68
End: 2023-05-08
Payer: MEDICARE

## 2023-05-08 ENCOUNTER — HOSPITAL ENCOUNTER (OUTPATIENT)
Dept: PAIN MEDICINE | Facility: HOSPITAL | Age: 68
Discharge: HOME | End: 2023-05-08
Payer: MEDICARE

## 2023-05-08 ENCOUNTER — HOSPITAL ENCOUNTER (OUTPATIENT)
Dept: GENERAL RADIOLOGY | Facility: HOSPITAL | Age: 68
Discharge: HOME | End: 2023-05-08
Payer: MEDICARE

## 2023-05-08 ENCOUNTER — ANESTHESIA EVENT (OUTPATIENT)
Dept: PAIN MEDICINE | Facility: HOSPITAL | Age: 68
End: 2023-05-08
Payer: MEDICARE

## 2023-05-08 VITALS
OXYGEN SATURATION: 99 % | TEMPERATURE: 97.8 F | HEIGHT: 68 IN | HEART RATE: 64 BPM | SYSTOLIC BLOOD PRESSURE: 142 MMHG | RESPIRATION RATE: 14 BRPM | BODY MASS INDEX: 21.37 KG/M2 | WEIGHT: 141 LBS | DIASTOLIC BLOOD PRESSURE: 80 MMHG

## 2023-05-08 DIAGNOSIS — G89.29 CHRONIC NECK PAIN: ICD-10-CM

## 2023-05-08 DIAGNOSIS — R52 PAIN: ICD-10-CM

## 2023-05-08 DIAGNOSIS — M48.02 CERVICAL SPINAL STENOSIS: ICD-10-CM

## 2023-05-08 DIAGNOSIS — M50.122 CERVICAL DISC DISORDER AT C5-C6 LEVEL WITH RADICULOPATHY: ICD-10-CM

## 2023-05-08 DIAGNOSIS — M54.2 CHRONIC NECK PAIN: ICD-10-CM

## 2023-05-08 DIAGNOSIS — M50.123 CERVICAL DISC DISORDER AT C6-C7 LEVEL WITH RADICULOPATHY: ICD-10-CM

## 2023-05-08 DIAGNOSIS — M62.838 NECK MUSCLE SPASM: ICD-10-CM

## 2023-05-08 PROCEDURE — 25510000001 IOPAMIDOL 41 % SOLUTION: Performed by: ANESTHESIOLOGY

## 2023-05-08 PROCEDURE — 25010000002 DEXAMETHASONE SODIUM PHOSPHATE 10 MG/ML SOLUTION: Performed by: ANESTHESIOLOGY

## 2023-05-08 PROCEDURE — 77003 FLUOROGUIDE FOR SPINE INJECT: CPT

## 2023-05-08 RX ORDER — DEXAMETHASONE SODIUM PHOSPHATE 10 MG/ML
10 INJECTION, SOLUTION INTRAMUSCULAR; INTRAVENOUS ONCE
Status: COMPLETED | OUTPATIENT
Start: 2023-05-08 | End: 2023-05-08

## 2023-05-08 RX ORDER — MIDAZOLAM HYDROCHLORIDE 1 MG/ML
1 INJECTION INTRAMUSCULAR; INTRAVENOUS AS NEEDED
Status: DISCONTINUED | OUTPATIENT
Start: 2023-05-08 | End: 2023-05-09 | Stop reason: HOSPADM

## 2023-05-08 RX ORDER — LIDOCAINE HYDROCHLORIDE 10 MG/ML
1 INJECTION, SOLUTION INFILTRATION; PERINEURAL ONCE AS NEEDED
Status: DISCONTINUED | OUTPATIENT
Start: 2023-05-08 | End: 2023-05-09 | Stop reason: HOSPADM

## 2023-05-08 RX ORDER — SODIUM CHLORIDE 0.9 % (FLUSH) 0.9 %
1-10 SYRINGE (ML) INJECTION AS NEEDED
Status: DISCONTINUED | OUTPATIENT
Start: 2023-05-08 | End: 2023-05-09 | Stop reason: HOSPADM

## 2023-05-08 RX ORDER — FENTANYL CITRATE 50 UG/ML
50 INJECTION, SOLUTION INTRAMUSCULAR; INTRAVENOUS AS NEEDED
Status: DISCONTINUED | OUTPATIENT
Start: 2023-05-08 | End: 2023-05-09 | Stop reason: HOSPADM

## 2023-05-08 RX ADMIN — DEXAMETHASONE SODIUM PHOSPHATE 10 MG: 10 INJECTION, SOLUTION INTRAMUSCULAR; INTRAVENOUS at 12:05

## 2023-05-08 RX ADMIN — IOPAMIDOL 10 ML: 408 INJECTION, SOLUTION INTRATHECAL at 12:05

## 2023-05-08 NOTE — H&P
HPI:  Seen in the past for cervical injections which did not give him much relief.  However he had a new MRI which shows progression of his cervical spine pathology.  Currently complains of cervical neck pain radiating down his bilateral arms.  6-8 out of 10.  Constant timing.  Tingling tight stabbing in nature.  Aggravated by activity as well as nothing in particular.  For over 6 weeks he is tried rest ice heat and physical therapy.  Current Outpatient Medications on File Prior to Encounter   Medication Sig Dispense Refill   • allopurinol (ZYLOPRIM) 300 MG tablet Take 1 tablet by mouth Daily.     • ALPRAZolam (XANAX) 0.5 MG tablet Take 1 tablet by mouth At Night As Needed (SLEEP).     • Aspirin-Acetaminophen-Caffeine (EXCEDRIN PO) Take 1 tablet by mouth As Needed.     • lactulose (CHRONULAC) 10 GM/15ML solution Take 30 mL by mouth 2 (Two) Times a Day.     • lansoprazole (PREVACID) 15 MG capsule Take 1 capsule by mouth Daily.     • liothyronine (CYTOMEL) 5 MCG tablet Take 1 tablet by mouth.     • meloxicam (MOBIC) 15 MG tablet Take 1 tablet by mouth Daily.     • meloxicam (MOBIC) 15 MG tablet Take 1 tablet by mouth Daily As Needed.     • methocarbamol (ROBAXIN) 750 MG tablet Take 1 tablet by mouth 2 (Two) Times a Day As Needed for Muscle Spasms. 60 tablet 3   • methylPREDNISolone (MEDROL) 4 MG dose pack follow package directions     • Synthroid 88 MCG tablet Take 1 tablet by mouth Daily.     • tamsulosin (FLOMAX) 0.4 MG capsule 24 hr capsule Take 1 capsule by mouth Daily.     • traMADol (ULTRAM) 50 MG tablet Take 1 tablet by mouth Every 6 (Six) Hours As Needed for Moderate Pain.       No current facility-administered medications on file prior to encounter.       Past Medical History:   Diagnosis Date   • Arthritis     OSTEO   • Avascular necrosis     HISTORY OF BOTH LEFT AND RIGHT HIPS   • Bilateral inguinal hernia    • Chronic neck pain    • Colon polyps     HISTORY OF   • Diverticulitis    • Diverticulosis    •  GERD (gastroesophageal reflux disease)    • Gout    • Hypertension     MED SHORT PERIOD OF TIME. NOTHING RECENTLY   • IBS (irritable bowel syndrome)     WITH CONSTIPATION   • Insomnia    • Loss of sensation     BOWEL.   • Palpitation     DURING THE NIGHT TIMES   • Peripheral neuropathy     GUSTAVO BALLS OF FEET   • Personal history of COVID-19     SEPT 13 2021. RESULT FROM [facility]   • PONV (postoperative nausea and vomiting)    • Thyroid disease     GOITER REMOVED-VASCULAR MASS. BENIGN,PARTIAL THYROIDECTOMY   • Umbilical hernia        Negative screen for SKINNY  Review of systems negative for hematologic, infectious or constitutional symptoms    Exam:  There were no vitals taken for this visit.  Airway Mallampatti 2  Alert and oriented    Diagnosis:    Post-Op Diagnosis Codes:     * Cervical radiculopathy [M54.12]    Plan:  Cervical 6 epidural steroid injection under fluoroscopic guidance    I have encouraged them to continue:  1.  Physical therapy exercises at home as prescribed by physical therapy or from the pain clinic handout.  Continuation of these exercises every day, or multiple times per week, even when the patient has good pain relief, was stressed to the patient as a preventative measure to decrease the frequency and severity of future pain episodes.  2.  Continue pain medicines as already prescribed.  If patient not currently taking any, it is recommended to begin Acetaminophen 1000 mg po q 8 hours.  If other medicines containing Acetaminophen are currently prescribed, maintain daily dose at 3000mg.    3.  If they can tolerate NSAIDS, it is recommended to take Ibuprofen 600 mg po q 6 hours for 7 days during pain exacerbations.  Alternatively, they may substitute an NSAID of their choice (e.g. Aleve)  4.  Heat and ice to the affected area as tolerated for pain control.  5.  Low impact exercise such as walking or water exercise was recommended to maintain overall health and aid in weight control.   6.  Follow  up as needed for subsequent injections.

## 2023-05-08 NOTE — ANESTHESIA PROCEDURE NOTES
PAIN Epidural block      Patient reassessed immediately prior to procedure    Patient location during procedure: pain clinic  Indication:procedure for pain  Performed By  Anesthesiologist: Rico Cage MD  Preanesthetic Checklist  Completed: patient identified and risks and benefits discussed  Additional Notes  Diagnosis:    Post-Op Diagnosis Codes:     * Cervical radiculopathy (M54.12)    Sedation: none    Sedation time:    A cervical epidural steroid injection with fluoroscopic guidance and an Isovue dye epidurogram was performed.  Under fluoroscopic guidance, the epidural space was identified and accessed, confirmed by loss of resistance to saline followed by injection of Isovue dye, which shows a good epidurogram.  The above medications were injected uneventfully.    Prep:  Pt Position:prone  Sterile Tech:cap, gloves, mask and sterile barrier  Prep:chlorhexidine gluconate and isopropyl alcohol  Monitoring:blood pressure monitoring, continuous pulse oximetry and EKG  Procedure:Sedation: no     Approach:midline  Guidance: fluoroscopy  Location:cervical  Level:6-7  Needle Type:Tuohy  Needle Gauge:20  Aspiration:negative  Medications:  Isovue:1mL  Comments:Isovue dye reveals good epidurogram    Decadron 10 mg preservative-free  Post Assessment:  Pt Tolerance:patient tolerated the procedure well with no apparent complications  Complications:no

## 2023-05-08 NOTE — DISCHARGE INSTRUCTIONS
"    Guide To Relieving And Avoiding Neck Pain    Exercise is important to help prevent and treat neck pain.  Good posture, exercise and avoiding injury will help to keep your neck healthy.    When the neck is strained or over worked symptoms may include headache, upper back pain, shoulder pain or arm pain.  Numbness or tingling in the fingers, dizziness or nausea may also occur.    Posture:    Avoid slumping over a desk.  Raise your work (including computer) to eye level to avoid bending at the neck.      Change Position Often:  Changing position prevents overuse of particular muscles.    Sleep On One Pillow:  Using to many pillows or to large of a pillow causes a \"kink\" in you neck.      Move and Exercise:  Living an active lifestyle is an important part of staying healthy.  Be sure to include the exercise to follow specifically for your neck.                    Range of Motion Exercises:  Do these exercises three times a day.  If you experience increased pain stop and contact your physician.  All exercises can be performed sitting or standing.        1.    2.   3.    1.  Place both hands behind you neck.  Gently tilt your neck backward so that you are looking at the ceiling.  Hold for a count of 10.    2.  Look straight facing forward.  Slowly tip your ear toward your right shoulder.  Do not force the motion.  Hold for a count of 10.  Bring head back to starting position and repeat to left side.    3.  Look straight facing forward.  Gently turn your head to the right.  Do not force the motion.  Hold for a count of 10.  Bring head back to starting position and repeat to the left side.    Exercises To Strengthen Muscles:  1.  Look straight facing forward.  Relax your shoulders.  Raise both shoulders toward your ears.  Hold for 3 seconds.       1.       2.   3.      1.  Look straight facing forward.  Relax your shoulders.  Raise both shoulders toward     2.  Raise your ars to your side and bend your elbows.  Squeeze " shoulder blades together as you rotate your arms outward.  Hold for 5 seconds.    3.  Look straight facing forward.  Pull your head straight back.  Do not tip or move your jaw.  Hold for 5 seconds.

## 2023-06-28 NOTE — OP NOTE
PREOPERATIVE DIAGNOSIS:  Symptomatic bilateral inguinal hernia, umbilical hernia  POSTOPERATIVE DIAGNOSIS: Symptomatic recurrent right inguinal hernia and left inguinal hernia, umbilical hernia    PROCEDURE:   - Robotic assisted laparoscopic right recurrent direct inguinal hernia repair with mesh placement  - Robotic assisted laparoscopic left indirect inguinal hernia repair with mesh placement  - Umbilical hernia repair    SURGEON/STAFF:  ALTAF Starr  Assistant: Fortunato Saez CSA  was responsible for performing the following activities: Retraction, Suction, Irrigation, Suturing, Closing, Placing Dressing and Held/Positioned Camera and their skilled assistance was necessary for the success of this case.  MESH USED: 3D Max large 10 by 15 cm medium weight polypropylene mesh.  ANESTHESIA:  General.   BLOOD LOSS:  Minimal.  COUNTS:  Needle and sponge count correct.  SPECIMENS: Metallic tackers removed from Arvin's ligament, not sent for pathological analysis    INDICATIONS FOR OPERATION:  Kenneth Jean Baptiste is a 66 y.o. male who presented to my clinic for evaluation of a bilateral inguinal bulges with chronic right groin pain as well as left symptomatic inguinal bulge.  On physical exam, he was seen to have a bilateral inguinal hernia as well as an umbilical hernia.  I offered the patient robotic assisted laparoscopic bilateral inguinal hernia repair with mesh placement and umbilical hernia repair and possible mesh placement. The risks and benefits of open, conservative, laparoscopic and robotic assisted laparoscopic management were discussed at length and in detail with the patient.  The risk and benefits of each procedure including the risk of incarceration and strangulation without surgery and the risk of bleeding, infection, mesh infection, chronic pain, hernia recurrence and the risk of anesthesia with surgical approach were discussed in detail with the patient.  The patient verbalized understanding and  agreed to proceed with robotic assisted bilateral inguinal hernia repair with mesh placement    OPERATION:  The patient was brought to the operating room in stable condition.   Preoperative antibiotics were given and sequential compression devices were applied.  At this time, the patient was laid supine on the operating room table.  General anesthesia was induced by the Anesthesia service without difficulty.  The patient's abdomen was prepped and draped in the usual sterile fashion.      At this time, the patient's abdomen was accessed at the umbilicus with open cutdown technique.  5 mm trocar was inserted.  Pneumoperitoneum was insufflated.  Upon exploration of the abdominal cavity there was evidence of a recurrent right direct inguinal hernia.  There was evidence of prior laparoscopic repair with mesh and metallic doctors over the right groin.  There was evidence of a moderate sized left indirect inguinal hernia.  I placed another two 8 mm trochars lateral to the umbilicus bilaterally under direct laparoscopic vision.  The initial 5 mm trocar was switched by an 8 mm robotic trocar.  Patient was positioned in slight Trendelenburg position and robotic arms were brought to the operative field and were connected to the trochars.  Instruments were introduced under direct vision.  I started the procedure by addressing first the right side.  Large peritoneal flap was raised from the level of the anterior superior iliac spine medially to the umbilical ligament approximately 3 cm above the indirect space and direct defect.  Preperitoneal flap was slowly created.  There was evidence of mesh and some metallic tackers that were covering the indirect space.  The mesh and tackers were slowly removed from the spermatic vessels and vas deferens.  Medial dissection dissected the mesh from the Arvin ligament and pubic bone.  They were 2 metallic tackers at Arvin ligament that were removed and removed from the operative field.   There were no sent for pathological analysis.  The right hernia sac was reduced.  Finally, I was able to dissect the mass from spermatic vessels and a large pocket was created for the mesh.   The cord structures were peritonealized back to the level of the genu of the vas deferens.  Because of bleeding at the level of the Arvin ligament I decided to place another 5 mm trocar over the left mid abdomen.  This was done under direct camera vision.  This was used for suctioning.  Hemostasis was achieved.  Lauren hemostatic agent was placed over the surgical bed.    Next, the mesh was positioned to cover the direct and indirect defect with good overlap.  It was sutured with 2-0 Vicryl once to Arvin ligament. It was then sutured with 2-0 Vicryl once medial to the epigastric vessels  and once laterally.  The mesh was then reperitonealized by bringing the peritoneum up and over the mesh and ensuring that the mesh lay in good position.  The peritoneum was closed with running 3-0 Monocryl unidirectional barbed suture.      Next the left side was addressed.  There was a moderate sized indirect defect.  A large peritoneal flap was then raised from the level of the anterior superior iliac spine medially to the umbilical ligament at a level approximately 3 cm above the direct hernia sac.  The lateral space was developed first, followed by the medial, using blunt dissection.  The medial dissection reduced exposed the midline space of Retzius as well as Arvin ligament and the contralateral mesh.   The peritoneum was then gently dissected from the underlying cord structures with care to preserve them.  The cord structures were peritonealized back to the level of the genu of the vas deferens.   Lauren hemostatic agent was placed over the surgical bed.    Next, the mesh was positioned at the left groin to cover the direct and indirect defects with good overlap.  The mesh was sutured with 2-0 Vicryl to the Arvin ligament as well as  lateral and medial from the epigastric vessels.  The mesh was then reperitonealized by bringing the peritoneum up and over the mesh and ensuring that the mesh lay in good position.  The peritoneum was sutured closed with running 3 -0 Monocryl unidirectional barbed suture    Next, the abdomen was inspected.  The field was completely cleaned with no evidence of intra-abdominal injury or bleeding.  I removed all the needles under camera view.  All the robotic instruments were removed and the robotic arms were disconnected from the trochars.  The robotic arms were removed from the operative field.  I then removed all the trochars under direct camera vision.  The umbilical fascia was dissected circumferentially and the defect was closed with 0 Vicryl and figure-of-eight.  The left umbilical fascial defect was closed with interrupted 0 Vicryl.   All skin incisions were closed with 4-0 Monocryl and surgical glue.      The patient was extubated in the recovery room in stable condition.  I, the attending surgeon, performed the entire operation.   No

## 2023-08-04 ENCOUNTER — TELEPHONE (OUTPATIENT)
Dept: NEUROSURGERY | Facility: CLINIC | Age: 68
End: 2023-08-04

## 2023-08-04 NOTE — TELEPHONE ENCOUNTER
PATIENT CALLED BACK - NOT HAPPY FIRST AVAILABLE ON MY END IS IN NOVEMBER.  WANTS TO BE SEEN SOONER, PLEASE CALL PATIENT WITH ANY ADVICE.    THANK YOU!

## 2023-08-04 NOTE — TELEPHONE ENCOUNTER
[facility] for patient to call us back. Had to cancel his appointment with Dr. Can on 8/14 due to Dr. LITTLE being unavailable that week. We can reschedule when he calls back.     [facility] can read.

## 2023-09-25 ENCOUNTER — HOSPITAL ENCOUNTER (OUTPATIENT)
Dept: GENERAL RADIOLOGY | Facility: HOSPITAL | Age: 68
Discharge: HOME | End: 2023-09-25
Payer: MEDICARE

## 2023-09-25 ENCOUNTER — ANESTHESIA (OUTPATIENT)
Dept: PAIN MEDICINE | Facility: HOSPITAL | Age: 68
End: 2023-09-25

## 2023-09-25 ENCOUNTER — ANESTHESIA EVENT (OUTPATIENT)
Dept: PAIN MEDICINE | Facility: HOSPITAL | Age: 68
End: 2023-09-25

## 2023-09-25 ENCOUNTER — HOSPITAL ENCOUNTER (OUTPATIENT)
Dept: PAIN MEDICINE | Facility: HOSPITAL | Age: 68
Discharge: HOME | End: 2023-09-25
Payer: MEDICARE

## 2023-09-25 VITALS
RESPIRATION RATE: 14 BRPM | SYSTOLIC BLOOD PRESSURE: 122 MMHG | HEART RATE: 70 BPM | DIASTOLIC BLOOD PRESSURE: 76 MMHG | OXYGEN SATURATION: 3 %

## 2023-09-25 DIAGNOSIS — M48.02 CERVICAL SPINAL STENOSIS: Primary | ICD-10-CM

## 2023-09-25 DIAGNOSIS — R52 PAIN: ICD-10-CM

## 2023-09-25 PROCEDURE — 25010000002 METHYLPREDNISOLONE PER 80 MG: Performed by: ANESTHESIOLOGY

## 2023-09-25 PROCEDURE — 77003 FLUOROGUIDE FOR SPINE INJECT: CPT

## 2023-09-25 PROCEDURE — 25510000001 IOPAMIDOL 41 % SOLUTION: Performed by: ANESTHESIOLOGY

## 2023-09-25 RX ORDER — METHYLPREDNISOLONE ACETATE 80 MG/ML
80 INJECTION, SUSPENSION INTRA-ARTICULAR; INTRALESIONAL; INTRAMUSCULAR; SOFT TISSUE ONCE
Status: COMPLETED | OUTPATIENT
Start: 2023-09-25 | End: 2023-09-25

## 2023-09-25 RX ORDER — IOPAMIDOL 408 MG/ML
12 INJECTION, SOLUTION INTRATHECAL
Status: COMPLETED | OUTPATIENT
Start: 2023-09-25 | End: 2023-09-25

## 2023-09-25 RX ORDER — LIDOCAINE HYDROCHLORIDE 10 MG/ML
1 INJECTION, SOLUTION INFILTRATION; PERINEURAL ONCE AS NEEDED
Status: DISCONTINUED | OUTPATIENT
Start: 2023-09-25 | End: 2023-09-26 | Stop reason: HOSPADM

## 2023-09-25 RX ORDER — PREGABALIN 75 MG/1
75 CAPSULE ORAL 3 TIMES DAILY
COMMUNITY
Start: 2023-06-28

## 2023-09-25 RX ORDER — MIDAZOLAM HYDROCHLORIDE 1 MG/ML
1 INJECTION INTRAMUSCULAR; INTRAVENOUS AS NEEDED
Status: DISCONTINUED | OUTPATIENT
Start: 2023-09-25 | End: 2023-09-26 | Stop reason: HOSPADM

## 2023-09-25 RX ORDER — SODIUM CHLORIDE 0.9 % (FLUSH) 0.9 %
1-10 SYRINGE (ML) INJECTION AS NEEDED
Status: DISCONTINUED | OUTPATIENT
Start: 2023-09-25 | End: 2023-09-26 | Stop reason: HOSPADM

## 2023-09-25 RX ORDER — FENTANYL CITRATE 50 UG/ML
50 INJECTION, SOLUTION INTRAMUSCULAR; INTRAVENOUS AS NEEDED
Status: DISCONTINUED | OUTPATIENT
Start: 2023-09-25 | End: 2023-09-26 | Stop reason: HOSPADM

## 2023-09-25 RX ADMIN — METHYLPREDNISOLONE ACETATE 80 MG: 80 INJECTION, SUSPENSION INTRA-ARTICULAR; INTRALESIONAL; INTRAMUSCULAR; SOFT TISSUE at 09:10

## 2023-09-25 RX ADMIN — IOPAMIDOL 10 ML: 408 INJECTION, SOLUTION INTRATHECAL at 09:10

## 2023-09-25 NOTE — DISCHARGE INSTRUCTIONS
"Guide To Relieving And Avoiding Neck Pain    Exercise is important to help prevent and treat neck pain.  Good posture, exercise and avoiding injury will help to keep your neck healthy.    When the neck is strained or over worked symptoms may include headache, upper back pain, shoulder pain or arm pain.  Numbness or tingling in the fingers, dizziness or nausea may also occur.    Posture:    Avoid slumping over a desk.  Raise your work (including computer) to eye level to avoid bending at the neck.      Change Position Often:  Changing position prevents overuse of particular muscles.    Sleep On One Pillow:  Using to many pillows or to large of a pillow causes a \"kink\" in you neck.      Move and Exercise:  Living an active lifestyle is an important part of staying healthy.  Be sure to include the exercise to follow specifically for your neck.                    Range of Motion Exercises:  Do these exercises three times a day.  If you experience increased pain stop and contact your physician.  All exercises can be performed sitting or standing.        1.    2.   3.    1.  Place both hands behind you neck.  Gently tilt your neck backward so that you are looking at the ceiling.  Hold for a count of 10.    2.  Look straight facing forward.  Slowly tip your ear toward your right shoulder.  Do not force the motion.  Hold for a count of 10.  Bring head back to starting position and repeat to left side.    3.  Look straight facing forward.  Gently turn your head to the right.  Do not force the motion.  Hold for a count of 10.  Bring head back to starting position and repeat to the left side.    Exercises To Strengthen Muscles:  1.  Look straight facing forward.  Relax your shoulders.  Raise both shoulders toward your ears.  Hold for 3 seconds.       1.       2.   3.      1.  Look straight facing forward.  Relax your shoulders.  Raise both shoulders toward     2.  Raise your ars to your side and bend your elbows.  Squeeze " shoulder blades together as you rotate your arms outward.  Hold for 5 seconds.    3.  Look straight facing forward.  Pull your head straight back.  Do not tip or move your jaw.  Hold for 5 seconds.   EPIDURAL STEROID INJECTION          An epidural steroid injection is a shot of steroid medicine and numbing medicine that is given into the space between the spinal cord and the bones of the back (epidural space).  The injection helps relieve pain by an irritated or swollen nerve root.    TELL YOUR HEALTH CARE PROVIDER ABOUT:  Any allergies you have  All medicines you are taking including any over the counter medicines  Any blood disorders you have  Any surgeries you have had  Any medical conditions you have  Whether you are pregnant or may be pregnant    WHAT ARE THE RISK?  Generally, this is a safe procedure. However,problems may occur, including  Headache  Bleeding  Infection  Allergic Reaction  Nerve Damage    WHAT CAN I EXPECT AFTER THE PROCEDURE?    INJECTION SITE  Remove the Band-Aid/s after 24 hours  Check your injection site every day for signs of infection.  Check for:             Redness             Bleeding (small amt is normal)             Warmth             Pus or bad odor  Some numbness may be experienced for several hours following the procedure.  Avoid using heat on the injection site for 24 hours. You may use ice intermittently if needed by placing a         towel between your skin and the ice bag and using the ice for 20 minutes 2-3 times a day.  Do not take baths, swim or use a hot tub for 24 hours.    ACTIVITY  No strenuous activity for 24 hours then return to normal activity as tolerated.  If your leg is numb, no driving until full sensation and strength has returned.    GENERAL INSTRUCTIONS:  The injection site may feel numb, use ice with caution if numbness is present and no heat for 24 hours or until numbness is gone.   If you have numbness or weakness in your arm or leg, use those areas with  caution until normal sensation returns.  It is not uncommon to notice an increase in discomfort or a change in the location of discomfort for 3-4 days after the procedure.  If discomfort is noticed at the injection site, ice may be            applied to that area for 20 min 2-3 times a day.  Take the pain medicine your physician has prescribed or over the counter pain relievers as long as you do not have any contraindications.  If you are a diabetic, monitor your blood sugar closely.  The steroids used in your procedure may increase your blood sugar level up to 36 hours after the injection.  If your blood sugar is greater than 250, call the physician that helps you monitor your blood sugar.  Keep all follow-up visits as scheduled by your health care provider. This is important.    CONTACT OUR OFFICE IF:  You have any of these signs of infection            -Redness, swelling, or warmth around your injection site.            -Fluid or blood coming from your injection site (small amt of blood is normal)            -Pus or a bad odor from your injection site            -A fever  You develop a severe headache or a stiff neck  You lose control of your bladder or bowel movements      PAIN MANAGEMENT CENTER HOURS   Monday-Friday 7:30 am. - 4:00 pm.  For any problem related to your procedure we can be reached at [phone].  If you experience an emergency with your procedure, call [phone] or go to the emergency room.

## 2023-09-25 NOTE — ANESTHESIA PROCEDURE NOTES
PAIN Epidural block    Pre-sedation assessment completed: 9/25/2023 9:07 AM    Patient reassessed immediately prior to procedure    Patient location during procedure: pain clinic  Start Time: 9/25/2023 9:07 AM  Stop Time: 9/25/2023 9:14 AM  Indication:procedure for pain  Performed By  Anesthesiologist: Maria Dolores Vilchis MD  Preanesthetic Checklist  Completed: patient identified, IV checked, site marked, risks and benefits discussed, surgical consent, monitors and equipment checked, pre-op evaluation and timeout performed  Additional Notes  Fluoro used.    Dx: cervical radiculopathy.    Prep:  Pt Position:prone  Sterile Tech:cap, gloves, mask and sterile barrier  Prep:chlorhexidine gluconate and isopropyl alcohol  Monitoring:blood pressure monitoring, continuous pulse oximetry and EKG  Procedure:Sedation: no     Approach:midline  Guidance: fluoroscopy  Location:cervical  Level:6-7  Needle Type:Tuohy  Needle Gauge:20  Aspiration:negative  Medications:  Preservative Free Saline:3mL  Isovue:1mL  Comments:Dye spread c/w epidurogramDepomedrol:80  Post Assessment:  Dressing:occlusive dressing applied  Pt Tolerance:patient tolerated the procedure well with no apparent complications  Complications:no

## 2023-09-25 NOTE — H&P
[facility]    History and Physical    Patient Name: Kenneth Jean Baptiste  :  1955  MRN:  [id number]  Date of Admission: 2023    Subjective     Patient is a 68 y.o. male presents with chief complaint of chronic, severe neck, shoulder: bilateral, and upper back pain.  Onset of symptoms was gradual starting several years ago.  Symptoms are associated/aggravated by nothing in particular. Symptoms improve with pain medication & mild improvement w/ injection, about 15-20%.  Pain is a 10/10 @ times.  Feels like his neck is in a vice , choking type pain that wraps around his neck.  Also intense pain in the left trapezius.  Pain radiates into base of skull & up into his head.  PCP started him on lyrica which he feels has been very helpful but the pain has continued to worsen w/ time.  He presents for dat.      The following portions of the patients history were reviewed and updated as appropriate: current medications, allergies, past medical history, past surgical history, past family history, past social history, and problem list                Objective     Past Medical History:   Past Medical History:   Diagnosis Date    Arthritis     OSTEO    Avascular necrosis     HISTORY OF BOTH LEFT AND RIGHT HIPS    Bilateral inguinal hernia     Chronic neck pain     Colon polyps     HISTORY OF    Diverticulitis     Diverticulosis     GERD (gastroesophageal reflux disease)     Gout     Hypertension     MED SHORT PERIOD OF TIME. NOTHING RECENTLY    IBS (irritable bowel syndrome)     WITH CONSTIPATION    Insomnia     Loss of sensation     BOWEL.    Palpitation     DURING THE NIGHT TIMES    Peripheral neuropathy     GUSTAVO BALLS OF FEET    Personal history of COVID-. RESULT FROM [facility] [place]    PONV (postoperative nausea and vomiting)     Thyroid disease     GOITER REMOVED-VASCULAR MASS. BENIGN,PARTIAL THYROIDECTOMY    Umbilical hernia      Past Surgical History:   Past Surgical History:   Procedure  Laterality Date    APPENDECTOMY  03/2013    Dr. Granado    BICEPS TENDON REPAIR Left 08/28/2012    COLONOSCOPY N/A 02/2021    EPIDURAL BLOCK      HAND SURGERY Left     FRACTURED HAND    HIP SURGERY Left 1982    NERVE SURGERY,     HIP SURGERY      LEFT AND RIGHT BONFIGLIO BONE GRAFT 11/82 AND 1/83    INGUINAL HERNIA REPAIR Right 2000    Dr. Mckenna    INGUINAL HERNIA REPAIR Bilateral 12/1/2021    Procedure: BILATERAL INGUINAL HERNIA REPAIR LAPAROSCOPIC WITH TickadeINCI ROBOT UMBILICAL HERNIA REPAIR;  Surgeon: Zbigniew Starr MD;  [facility]: [facility];  Service: Robotics - DaVinci;  Laterality: Bilateral;    ORIF ULNA/RADIUS FRACTURES Right     WITH HARDWARE. SINCE REMOVED    PARTIAL HIP ARTHROPLASTY Left 1984    THYROIDECTOMY, PARTIAL  06/08/2016    TOTAL HIP ARTHROPLASTY REVISION Left     SWITCHED TO FULL TOTAL     Family History:   Family History   Problem Relation Age of Onset    Cancer Mother     Stroke Father     Heart disease Father     Cancer Sister     No Known Problems Daughter     Graves' disease Son     Cancer Maternal Aunt     Malig Hyperthermia Neg Hx      Social History:   Social History     Socioeconomic History    Marital status:    Tobacco Use    Smoking status: Never    Smokeless tobacco: Never   Vaping Use    Vaping Use: Never used   Substance and Sexual Activity    Alcohol use: Yes     Alcohol/week: 7.0 standard drinks     Types: 7 Standard drinks or equivalent per week     Comment: 1/day    Drug use: No    Sexual activity: Yes     Partners: Female     Birth control/protection: None       Vital Signs Range for the last 24 hours  Temperature:     Temp Source:     BP: BP: (142)/(91) 142/91   Pulse: Heart Rate:  [69] 69   Respirations: Resp:  [16] 16   SPO2: SpO2:  [100 %] 100 %   O2 Amount (l/min):     O2 Devices Device (Oxygen Therapy): room air   Weight:           --------------------------------------------------------------------------------    Current Outpatient Medications    Medication Sig Dispense Refill    allopurinol (ZYLOPRIM) 300 MG tablet Take 1 tablet by mouth Daily.      ALPRAZolam (XANAX) 0.5 MG tablet Take 1 tablet by mouth At Night As Needed (SLEEP).      Aspirin-Acetaminophen-Caffeine (EXCEDRIN PO) Take 1 tablet by mouth As Needed.      lactulose (CHRONULAC) 10 GM/15ML solution Take 30 mL by mouth 2 (Two) Times a Day.      lansoprazole (PREVACID) 15 MG capsule Take 1 capsule by mouth Daily.      liothyronine (CYTOMEL) 5 MCG tablet Take 1 tablet by mouth.      meloxicam (MOBIC) 15 MG tablet Take 1 tablet by mouth Daily.      methocarbamol (ROBAXIN) 750 MG tablet Take 1 tablet by mouth 2 (Two) Times a Day As Needed for Muscle Spasms. 60 tablet 3    pregabalin (LYRICA) 75 MG capsule Take 1 capsule by mouth 3 (Three) Times a Day.      Synthroid 88 MCG tablet Take 1 tablet by mouth Daily.      tamsulosin (FLOMAX) 0.4 MG capsule 24 hr capsule Take 1 capsule by mouth Daily.      traMADol (ULTRAM) 50 MG tablet Take 1 tablet by mouth Every 6 (Six) Hours As Needed for Moderate Pain.      meloxicam (MOBIC) 15 MG tablet Take 1 tablet by mouth Daily As Needed.       Current Facility-Administered Medications   Medication Dose Route Frequency Provider Last Rate Last Admin    fentaNYL citrate (PF) (SUBLIMAZE) injection 50 mcg  50 mcg Intravenous PRN Maria Dolores Vilchis MD        iopamidol (ISOVUE-M 200) injection 41%  12 mL Epidural Once in imaging Maria Dolores Vilchis MD        lidocaine (XYLOCAINE) 1 % injection 1 mL  1 mL Intradermal Once PRN Maria Dolores Vilchis MD        methylPREDNISolone acetate (DEPO-medrol) injection 80 mg  80 mg Intra-articular Once Maria Dolores Vilchis MD        midazolam (VERSED) injection 1 mg  1 mg Intravenous PRN Maria Dolores Vilchis MD        sodium chloride 0.9 % flush 1-10 mL  1-10 mL Intravenous PRN Maria Dolores Vilchis MD            --------------------------------------------------------------------------------  Assessment & Plan      Anesthesia Evaluation     Patient summary reviewed and Nursing notes reviewed   history of anesthetic complications:  PONV               Airway   Mallampati: II  Dental      Pulmonary - negative pulmonary ROS   Cardiovascular   Exercise tolerance: good (4-7 METS)    (+) hypertension      Neuro/Psych  (+) headaches, numbness  GI/Hepatic/Renal/Endo    (+) GERD, thyroid problem hypothyroidism    Musculoskeletal     (+) chronic pain, neck pain, radiculopathy  Abdominal    Substance History - negative use     OB/GYN negative ob/gyn ROS         Other   arthritis,                Diagnosis and Plan    Treatment Plan  ASA 2   Patient has had previous injection/procedure with 10-25% improvement.   Procedures: Cervical Epidural Steroid Injection(LIDA), With fluoroscopy,      Anesthetic plan and risks discussed with patient.        Diagnosis     * Cervical radiculopathy [M54.12]

## 2023-10-27 NOTE — PROGRESS NOTES
"Subjective   Patient ID: Kenneth Jean Baptiste is a 68 y.o. male is here today for follow-up.    This patient has had neck pain and chronic arm pain for a while.  The rheumatological evaluation did not show any evidence of any psoriatic arthritis.  His primary care physician put him on some Lyrica and eventually increased it to 75 mg 3 times daily and that has helped apparently a lot.  He feels much better with less pain and more function in his neck and his arms.  He has no motor deficits.  He also describes it almost as a form of wellbeing or euphoria which could be from the Lyrica in part.  In any event I told him to hold off on any further epidural blocks.  He did have one between the last visit and now it seemed to help a bit but it was the Lyrica that seemed to do most of this.  We will see him in 10 months.  If things flareup he can call us and we can get another epidural block but my hope is we can avoid a cervical surgery.        History of Present Illness    The following portions of the patient's history were reviewed and updated as appropriate: allergies, current medications, past family history, past medical history, past social history, past surgical history, and problem list.    Review of Systems   Constitutional:  Negative for fever.   Musculoskeletal:  Positive for neck pain and neck stiffness.   Neurological:  Negative for weakness and numbness.   All other systems reviewed and are negative.          Objective     Vitals:    11/01/23 1319   BP: 122/60   BP Location: Left arm   Patient Position: Sitting   Cuff Size: Adult   Resp: 20   Weight: 64 kg (141 lb)   Height: 172.7 cm (67.99\")   PainSc:   2   PainLoc: Neck     Body mass index is 21.44 kg/m².    Tobacco Use: Low Risk  (11/1/2023)    Patient History     Smoking Tobacco Use: Never     Smokeless Tobacco Use: Never     Passive Exposure: Not on file          Physical Exam  Constitutional:       Appearance: He is well-developed.   HENT:      Head: " Normocephalic and atraumatic.   Eyes:      Extraocular Movements: EOM normal.      Conjunctiva/sclera: Conjunctivae normal.      Pupils: Pupils are equal, round, and reactive to light.   Neck:      Vascular: No carotid bruit.   Neurological:      Mental Status: He is oriented to person, place, and time.      Coordination: Finger-Nose-Finger Test and Heel to Shin Test normal.      Gait: Gait is intact.      Deep Tendon Reflexes:      Reflex Scores:       Tricep reflexes are 2+ on the right side and 2+ on the left side.       Bicep reflexes are 2+ on the right side and 2+ on the left side.       Brachioradialis reflexes are 2+ on the right side and 2+ on the left side.       Patellar reflexes are 2+ on the right side and 2+ on the left side.       Achilles reflexes are 2+ on the right side and 2+ on the left side.  Psychiatric:         Speech: Speech normal.       Neurologic Exam     Mental Status   Oriented to person, place, and time.   Registration of memory: Good recent and remote memory.   Attention: normal. Concentration: normal.   Speech: speech is normal   Level of consciousness: alert  Knowledge: consistent with education.     Cranial Nerves     CN II   Visual fields full to confrontation.   Visual acuity: normal    CN III, IV, VI   Pupils are equal, round, and reactive to light.  Extraocular motions are normal.     CN V   Facial sensation intact.   Right corneal reflex: normal  Left corneal reflex: normal    CN VII   Facial expression full, symmetric.   Right facial weakness: none  Left facial weakness: none    CN VIII   Hearing: intact    CN IX, X   Palate: symmetric    CN XI   Right sternocleidomastoid strength: normal  Left sternocleidomastoid strength: normal    CN XII   Tongue: not atrophic  Tongue deviation: none    Motor Exam   Muscle bulk: normal  Right arm tone: normal  Left arm tone: normal  Right leg tone: normal  Left leg tone: normal    Strength   Strength 5/5 except as noted.     Sensory Exam    Light touch normal.     Gait, Coordination, and Reflexes     Gait  Gait: normal    Coordination   Finger to nose coordination: normal  Heel to shin coordination: normal    Reflexes   Right brachioradialis: 2+  Left brachioradialis: 2+  Right biceps: 2+  Left biceps: 2+  Right triceps: 2+  Left triceps: 2+  Right patellar: 2+  Left patellar: 2+  Right achilles: 2+  Left achilles: 2+  Right : 2+  Left : 2+          Assessment & Plan   Independent Review of Radiographic Studies:      I personally reviewed the images from the following studies.    I reviewed the cervical MRI done on 1/31/2023 which did not show a whole lot of progression since earlier studies.  He does have cervical stenosis with some foraminal entrapment at C3-C4, C4-C5 on the right and C5-C6 bilaterally.  But no severe cord compression.  Agree with the report.          Medical Decision Making:      Fortunately, he is feeling better on the Lyrica at 75 mg 3 times daily prescribed by his PCP.  I urged him to do his own neck exercises and see me in 10 months.  We will hold off on any blocks for now since he is better but if there is a bad flareup he will call us and we can send him back to the pain clinic for another cervical epidural block.  Hopefully this treatment is sustainable and we can avoid surgery.        Diagnoses and all orders for this visit:    1. Chronic neck pain (Primary)    2. Cervical disc disorder at C6-C7 level with radiculopathy    3. Cervical disc disorder at C5-C6 level with radiculopathy    4. Neck muscle spasm    5. Cervical spinal stenosis      Return in about 10 months (around 9/1/2024) for face to face.

## 2023-11-01 ENCOUNTER — OFFICE VISIT (OUTPATIENT)
Dept: NEUROSURGERY | Facility: CLINIC | Age: 68
End: 2023-11-01
Payer: MEDICARE

## 2023-11-01 VITALS
SYSTOLIC BLOOD PRESSURE: 122 MMHG | DIASTOLIC BLOOD PRESSURE: 60 MMHG | HEIGHT: 68 IN | RESPIRATION RATE: 20 BRPM | BODY MASS INDEX: 21.37 KG/M2 | WEIGHT: 141 LBS

## 2023-11-01 DIAGNOSIS — M48.02 CERVICAL SPINAL STENOSIS: ICD-10-CM

## 2023-11-01 DIAGNOSIS — G89.29 CHRONIC NECK PAIN: Primary | ICD-10-CM

## 2023-11-01 DIAGNOSIS — M50.122 CERVICAL DISC DISORDER AT C5-C6 LEVEL WITH RADICULOPATHY: ICD-10-CM

## 2023-11-01 DIAGNOSIS — M54.2 CHRONIC NECK PAIN: Primary | ICD-10-CM

## 2023-11-01 DIAGNOSIS — M62.838 NECK MUSCLE SPASM: ICD-10-CM

## 2023-11-01 DIAGNOSIS — M50.123 CERVICAL DISC DISORDER AT C6-C7 LEVEL WITH RADICULOPATHY: ICD-10-CM

## 2023-11-01 PROCEDURE — 99214 OFFICE O/P EST MOD 30 MIN: CPT | Performed by: NEUROLOGICAL SURGERY

## 2023-11-01 PROCEDURE — 1160F RVW MEDS BY RX/DR IN RCRD: CPT | Performed by: NEUROLOGICAL SURGERY

## 2023-11-01 PROCEDURE — 1159F MED LIST DOCD IN RCRD: CPT | Performed by: NEUROLOGICAL SURGERY

## 2023-11-01 RX ORDER — CEPHALEXIN 500 MG/1
500 CAPSULE ORAL 2 TIMES DAILY
COMMUNITY
Start: 2023-10-31 | End: 2023-11-14

## 2023-12-18 ENCOUNTER — OFFICE (AMBULATORY)
Dept: URBAN - METROPOLITAN AREA CLINIC 76 | Facility: CLINIC | Age: 68
End: 2023-12-18
Payer: COMMERCIAL

## 2023-12-18 VITALS
HEIGHT: 68 IN | DIASTOLIC BLOOD PRESSURE: 82 MMHG | SYSTOLIC BLOOD PRESSURE: 142 MMHG | SYSTOLIC BLOOD PRESSURE: 166 MMHG | DIASTOLIC BLOOD PRESSURE: 96 MMHG | OXYGEN SATURATION: 96 % | WEIGHT: 154 LBS | HEART RATE: 63 BPM

## 2023-12-18 DIAGNOSIS — Z80.9 FAMILY HISTORY OF MALIGNANT NEOPLASM, UNSPECIFIED: ICD-10-CM

## 2023-12-18 DIAGNOSIS — K57.30 DIVERTICULOSIS OF LARGE INTESTINE WITHOUT PERFORATION OR ABS: ICD-10-CM

## 2023-12-18 DIAGNOSIS — R10.31 RIGHT LOWER QUADRANT PAIN: ICD-10-CM

## 2023-12-18 DIAGNOSIS — R14.3 FLATULENCE: ICD-10-CM

## 2023-12-18 DIAGNOSIS — Z86.010 PERSONAL HISTORY OF COLONIC POLYPS: ICD-10-CM

## 2023-12-18 DIAGNOSIS — K58.1 IRRITABLE BOWEL SYNDROME WITH CONSTIPATION: ICD-10-CM

## 2023-12-18 PROBLEM — D12.2 BENIGN NEOPLASM OF ASCENDING COLON: Status: ACTIVE | Noted: 2017-06-21

## 2023-12-18 PROCEDURE — 99214 OFFICE O/P EST MOD 30 MIN: CPT | Performed by: NURSE PRACTITIONER

## 2023-12-18 RX ORDER — LINACLOTIDE 145 UG/1
CAPSULE, GELATIN COATED ORAL
Qty: 90 | Refills: 3 | Status: ACTIVE
Start: 2023-12-18

## 2024-03-28 DIAGNOSIS — M50.122 CERVICAL DISC DISORDER AT C5-C6 LEVEL WITH RADICULOPATHY: ICD-10-CM

## 2024-03-28 DIAGNOSIS — M54.2 CHRONIC NECK PAIN: ICD-10-CM

## 2024-03-28 DIAGNOSIS — M50.123 CERVICAL DISC DISORDER AT C6-C7 LEVEL WITH RADICULOPATHY: Primary | ICD-10-CM

## 2024-03-28 DIAGNOSIS — G89.29 CHRONIC NECK PAIN: ICD-10-CM

## 2024-04-03 DIAGNOSIS — M50.122 CERVICAL DISC DISORDER AT C5-C6 LEVEL WITH RADICULOPATHY: ICD-10-CM

## 2024-04-03 DIAGNOSIS — M62.838 NECK MUSCLE SPASM: ICD-10-CM

## 2024-04-03 DIAGNOSIS — M54.2 CHRONIC NECK PAIN: ICD-10-CM

## 2024-04-03 DIAGNOSIS — M50.123 CERVICAL DISC DISORDER AT C6-C7 LEVEL WITH RADICULOPATHY: Primary | ICD-10-CM

## 2024-04-03 DIAGNOSIS — M48.02 CERVICAL SPINAL STENOSIS: ICD-10-CM

## 2024-04-03 DIAGNOSIS — G89.29 CHRONIC NECK PAIN: ICD-10-CM

## 2024-04-23 ENCOUNTER — ANESTHESIA EVENT (OUTPATIENT)
Dept: PAIN MEDICINE | Facility: HOSPITAL | Age: 69
End: 2024-04-23
Payer: MEDICARE

## 2024-04-23 ENCOUNTER — ANESTHESIA (OUTPATIENT)
Dept: PAIN MEDICINE | Facility: HOSPITAL | Age: 69
End: 2024-04-23
Payer: MEDICARE

## 2024-04-23 ENCOUNTER — HOSPITAL ENCOUNTER (OUTPATIENT)
Dept: GENERAL RADIOLOGY | Facility: HOSPITAL | Age: 69
Discharge: HOME | End: 2024-04-23
Payer: MEDICARE

## 2024-04-23 ENCOUNTER — HOSPITAL ENCOUNTER (OUTPATIENT)
Dept: PAIN MEDICINE | Facility: HOSPITAL | Age: 69
Discharge: HOME | End: 2024-04-23
Payer: MEDICARE

## 2024-04-23 VITALS
RESPIRATION RATE: 16 BRPM | WEIGHT: 143 LBS | OXYGEN SATURATION: 99 % | HEIGHT: 68 IN | SYSTOLIC BLOOD PRESSURE: 167 MMHG | HEART RATE: 64 BPM | BODY MASS INDEX: 21.67 KG/M2 | DIASTOLIC BLOOD PRESSURE: 98 MMHG | TEMPERATURE: 98.5 F

## 2024-04-23 DIAGNOSIS — R52 PAIN: ICD-10-CM

## 2024-04-23 DIAGNOSIS — M50.123 CERVICAL DISC DISORDER AT C6-C7 LEVEL WITH RADICULOPATHY: Primary | ICD-10-CM

## 2024-04-23 PROCEDURE — 25010000002 DEXAMETHASONE SODIUM PHOSPHATE 10 MG/ML SOLUTION: Performed by: STUDENT IN AN ORGANIZED HEALTH CARE EDUCATION/TRAINING PROGRAM

## 2024-04-23 PROCEDURE — 25510000001 IOPAMIDOL 41 % SOLUTION: Performed by: STUDENT IN AN ORGANIZED HEALTH CARE EDUCATION/TRAINING PROGRAM

## 2024-04-23 PROCEDURE — 25010000002 DEXAMETHASONE PER 1 MG: Performed by: STUDENT IN AN ORGANIZED HEALTH CARE EDUCATION/TRAINING PROGRAM

## 2024-04-23 PROCEDURE — 77003 FLUOROGUIDE FOR SPINE INJECT: CPT

## 2024-04-23 RX ORDER — SODIUM CHLORIDE 0.9 % (FLUSH) 0.9 %
1-10 SYRINGE (ML) INJECTION AS NEEDED
Status: DISCONTINUED | OUTPATIENT
Start: 2024-04-23 | End: 2024-04-24 | Stop reason: HOSPADM

## 2024-04-23 RX ORDER — DEXAMETHASONE SODIUM PHOSPHATE 4 MG/ML
INJECTION, SOLUTION INTRA-ARTICULAR; INTRALESIONAL; INTRAMUSCULAR; INTRAVENOUS; SOFT TISSUE
Status: COMPLETED | OUTPATIENT
Start: 2024-04-23 | End: 2024-04-23

## 2024-04-23 RX ORDER — LIDOCAINE HYDROCHLORIDE 10 MG/ML
1 INJECTION, SOLUTION INFILTRATION; PERINEURAL ONCE AS NEEDED
Status: DISCONTINUED | OUTPATIENT
Start: 2024-04-23 | End: 2024-04-24 | Stop reason: HOSPADM

## 2024-04-23 RX ORDER — IOPAMIDOL 408 MG/ML
12 INJECTION, SOLUTION INTRATHECAL
Status: COMPLETED | OUTPATIENT
Start: 2024-04-23 | End: 2024-04-23

## 2024-04-23 RX ORDER — DEXAMETHASONE SODIUM PHOSPHATE 10 MG/ML
10 INJECTION, SOLUTION INTRAMUSCULAR; INTRAVENOUS ONCE
Status: COMPLETED | OUTPATIENT
Start: 2024-04-23 | End: 2024-04-23

## 2024-04-23 RX ADMIN — DEXAMETHASONE SODIUM PHOSPHATE 10 MG: 4 INJECTION, SOLUTION INTRA-ARTICULAR; INTRALESIONAL; INTRAMUSCULAR; INTRAVENOUS; SOFT TISSUE at 13:16

## 2024-04-23 RX ADMIN — IOPAMIDOL 10 ML: 408 INJECTION, SOLUTION INTRATHECAL at 13:19

## 2024-04-23 RX ADMIN — DEXAMETHASONE SODIUM PHOSPHATE 10 MG: 10 INJECTION, SOLUTION INTRAMUSCULAR; INTRAVENOUS at 13:19

## 2024-04-23 NOTE — ANESTHESIA PROCEDURE NOTES
PAIN Epidural block      Patient reassessed immediately prior to procedure    Patient location during procedure: pain clinic  Indication:procedure for pain  Performed By  Anesthesiologist: Prachi Acuña MD  Preanesthetic Checklist  Completed: patient identified, risks and benefits discussed, surgical consent, monitors and equipment checked, pre-op evaluation and timeout performed  Additional Notes  Needle placement guided by fluoroscopy and confirmed with NAN and contrast injection.     Diagnosis:  Post-Op Diagnosis Codes:     * Cervical radiculopathy [M54.12]    Sedation:  none  Sedation time:  Prep:  Pt Position:prone  Sterile Tech:cap, gloves, sterile barrier and mask  Prep:chlorhexidine gluconate and isopropyl alcohol  Monitoring:EKG, continuous pulse oximetry and blood pressure monitoring  Procedure:Sedation: no     Approach:left paramedian  Guidance: fluoroscopy  Location:cervical  Interspace: Lumbar Intralaminar.  Needle Type:Tuohy  Needle Gauge:20 G  Aspiration:negative  Medications:  Preservative Free Saline:3mL  Isovue:1mL    Post Assessment:  Post-procedure: Bandaid.  Pt Tolerance:patient tolerated the procedure well with no apparent complications  Complications:no

## 2024-04-23 NOTE — DISCHARGE INSTRUCTIONS
EPIDURAL STEROID INJECTION          An epidural steroid injection is a shot of steroid medicine and numbing medicine that is given into the space between the spinal cord and the bones of the back (epidural space).  The injection helps relieve pain by an irritated or swollen nerve root.    TELL YOUR HEALTH CARE PROVIDER ABOUT:  Any allergies you have  All medicines you are taking including any over the counter medicines  Any blood disorders you have  Any surgeries you have had  Any medical conditions you have  Whether you are pregnant or may be pregnant    WHAT ARE THE RISK?  Generally, this is a safe procedure. However,problems may occur, including  Headache  Bleeding  Infection  Allergic Reaction  Nerve Damage    WHAT CAN I EXPECT AFTER THE PROCEDURE?    INJECTION SITE  Remove the Band-Aid/s after 24 hours  Check your injection site every day for signs of infection.  Check for:             Redness             Bleeding (small amt is normal)             Warmth             Pus or bad odor  Some numbness may be experienced for several hours following the procedure.  Avoid using heat on the injection site for 24 hours. You may use ice intermittently if needed by placing a         towel between your skin and the ice bag and using the ice for 20 minutes 2-3 times a day.  Do not take baths, swim or use a hot tub for 24 hours.    ACTIVITY  No strenuous activity for 24 hours then return to normal activity as tolerated.  If your leg is numb, no driving until full sensation and strength has returned.    GENERAL INSTRUCTIONS:  The injection site may feel numb, use ice with caution if numbness is present and no heat for 24 hours or until numbness is gone.   If you have numbness or weakness in your arm or leg, use those areas with caution until normal sensation returns.  It is not uncommon to notice an increase in discomfort or a change in the location of discomfort for 3-4 days after the procedure.  If discomfort is noticed  "at the injection site, ice may be            applied to that area for 20 min 2-3 times a day.  Take the pain medicine your physician has prescribed or over the counter pain relievers as long as you do not have any contraindications.  If you are a diabetic, monitor your blood sugar closely.  The steroids used in your procedure may increase your blood sugar level up to 36 hours after the injection.  If your blood sugar is greater than 250, call the physician that helps you monitor your blood sugar.  Keep all follow-up visits as scheduled by your health care provider. This is important.    CONTACT OUR OFFICE IF:  You have any of these signs of infection            -Redness, swelling, or warmth around your injection site.            -Fluid or blood coming from your injection site (small amt of blood is normal)            -Pus or a bad odor from your injection site            -A fever  You develop a severe headache or a stiff neck  You lose control of your bladder or bowel movements      PAIN [facility]NA[facility]EMENT [facility]ENTER HOURS   Monday-Friday 7:30 am. - 4:00 pm.  For any problem related to your procedure we can be reached at [phone].  If you experience an emergency with your procedure, call [phone] or go to the emergency room.                   Guide To Relieving And Avoiding Neck Pain    Exercise is important to help prevent and treat neck pain.  Good posture, exercise and avoiding injury will help to keep your neck healthy.    When the neck is strained or over worked symptoms may include headache, upper back pain, shoulder pain or arm pain.  Numbness or tingling in the fingers, dizziness or nausea may also occur.    Posture:    Avoid slumping over a desk.  Raise your work (including computer) to eye level to avoid bending at the neck.      Change Position Often:  Changing position prevents overuse of particular muscles.    Sleep On One Pillow:  Using to many pillows or to large of a pillow causes a \"kink\" in you " neck.      Move and Exercise:  Living an active lifestyle is an important part of staying healthy.  Be sure to include the exercise to follow specifically for your neck.                    Range of Motion Exercises:  Do these exercises three times a day.  If you experience increased pain stop and contact your physician.  All exercises can be performed sitting or standing.        1.    2.   3.    1.  Place both hands behind you neck.  Gently tilt your neck backward so that you are looking at the ceiling.  Hold for a count of 10.    2.  Look straight facing forward.  Slowly tip your ear toward your right shoulder.  Do not force the motion.  Hold for a count of 10.  Bring head back to starting position and repeat to left side.    3.  Look straight facing forward.  Gently turn your head to the right.  Do not force the motion.  Hold for a count of 10.  Bring head back to starting position and repeat to the left side.    Exercises To Strengthen Muscles:  1.  Look straight facing forward.  Relax your shoulders.  Raise both shoulders toward your ears.  Hold for 3 seconds.       1.       2.   3.      1.  Look straight facing forward.  Relax your shoulders.  Raise both shoulders toward     2.  Raise your ars to your side and bend your elbows.  Squeeze shoulder blades together as you rotate your arms outward.  Hold for 5 seconds.    3.  Look straight facing forward.  Pull your head straight back.  Do not tip or move your jaw.  Hold for 5 seconds.

## 2024-04-23 NOTE — H&P
CHIEF COMPLAINT:   Chronic neck and arm pain    HISTORY OF PRESENT ILLNESS:  The patient is a 68 y.o. male who presents today with complaints of chronic neck and arm pain that has been previously treated with cervical epidural.  He is followed by Dr. Can.  He was recently given a rheumatologic workup and has been diagnosed with fibromyalgia.    The patient's pain is located in the head and neck and radiates to the left shoulder and arm.  He is having spastic pain in the left shoulder, but does have increased pain in the right side of his neck.  Their pain is a 4 out of 10 with activity.  The symptom is described as intermittent deep pressure, stabbing, tightness.  The pain is worsening in severity.   Their symptoms are exacerbated by activity and alleviated by rest, starting Lyrica, LIDA.    The conservative measures the patient has attempted recently without significant improvement include: Rest, ice, heat, OTC medications, Lyrica.     Prior imaging was reviewed that showed cervical stenosis at C3-4, C4-5, C5-6.      PAST MEDICAL HISTORY:  Current Outpatient Medications on File Prior to Encounter   Medication Sig Dispense Refill    allopurinol (ZYLOPRIM) 300 MG tablet Take 1 tablet by mouth Daily.      ALPRAZolam (XANAX) 0.5 MG tablet Take 1 tablet by mouth At Night As Needed (SLEEP).      Aspirin-Acetaminophen-Caffeine (EXCEDRIN PO) Take 1 tablet by mouth As Needed.      lactulose (CHRONULAC) 10 GM/15ML solution Take 30 mL by mouth 2 (Two) Times a Day.      lansoprazole (PREVACID) 15 MG capsule Take 1 capsule by mouth Daily.      liothyronine (CYTOMEL) 5 MCG tablet Take 1 tablet by mouth.      meloxicam (MOBIC) 15 MG tablet Take 1 tablet by mouth Daily.      methocarbamol (ROBAXIN) 750 MG tablet Take 1 tablet by mouth 2 (Two) Times a Day As Needed for Muscle Spasms. 60 tablet 3    pregabalin (LYRICA) 75 MG capsule Take 1 capsule by mouth 3 (Three) Times a Day.      Synthroid 88 MCG tablet Take 1 tablet by  "mouth Daily.      tamsulosin (FLOMAX) 0.4 MG capsule 24 hr capsule Take 1 capsule by mouth Daily.      traMADol (ULTRAM) 50 MG tablet Take 1 tablet by mouth Every 6 (Six) Hours As Needed for Moderate Pain.       No current facility-administered medications on file prior to encounter.       Past Medical History:   Diagnosis Date    Arthritis     OSTEO    Avascular necrosis     HISTORY OF BOTH LEFT AND RIGHT HIPS    Bilateral inguinal hernia     Chronic neck pain     Colon polyps     HISTORY OF    Diverticulitis     Diverticulosis     GERD (gastroesophageal reflux disease)     Gout     Hypertension     MED SHORT PERIOD OF TIME. NOTHING RECENTLY    IBS (irritable bowel syndrome)     WITH CONSTIPATION    Insomnia     Loss of sensation     BOWEL.    Palpitation     DURING THE NIGHT TIMES    Peripheral neuropathy     GUSTAVO BALLS OF FEET    Personal history of COVID-19 SEPT 13 2021. RESULT FROM [facility]    PONV (postoperative nausea and vomiting)     Thyroid disease     GOITER REMOVED-VASCULAR MASS. BENIGN,PARTIAL THYROIDECTOMY    Umbilical hernia          SOCIAL HISTORY:  Negative tobacco product use    REVIEW OF SYSTEMS:  No hematologic infectious or constitutional symptoms  Other review of systems non-contributory  Negative screen for SKINNY      PHYSICAL EXAM:  /82 (BP Location: Left arm, Patient Position: Sitting)   Pulse 68   Temp 36.9 °C (98.5 °F) (Oral)   Resp 16   Ht 172.7 cm (68\")   Wt 64.9 kg (143 lb)   SpO2 99%   BMI 21.74 kg/m²   Well-developed, well-nourished, no acute distress  Alert and oriented ×3  Extra ocular movements intact  Unlabored respirations  Extremities warm and well-perfused  Deep tendon reflexes normal in the upper extremity  Normal upper extremity strength  No gait disturbance      DIAGNOSIS:  Post-Op Diagnosis Codes:     * Cervical radiculopathy [M54.12]    PLAN:  1.  Cervical 7 epidural steroid injections, up to 3. If pain control is acceptable after 1 or 2 injections, it was " discussed with the patient that they may return for the subsequent injections if and when their pain returns.  The risks were discussed with the patient including failure of relief, worsening pain, Headache (post dural puncture headache), bleeding (epidural hematoma) and infection (epidural abscess or skin infection).  2.  Physical therapy exercises at home as prescribed by physical therapy or from the pain clinic handout.  Continuation of these exercises every day, or multiple times per week, even when the patient has good pain relief, was stressed to the patient as a preventative measure to decrease the frequency and severity of future pain episodes.  3.  Continue pain medicines as already prescribed.  If patient not currently taking any, it is recommended to begin Acetaminophen 1000 mg po q 8 hours.  If other medicines containing Acetaminophen are currently prescribed, maintain daily dose at 3000 mg.    4.  If they can tolerate NSAIDS, it is recommended to take Ibuprofen 600 mg po q 6 hours for 7 days during pain exacerbations.  Alternatively, they may substitute an NSAID of their choice (e.g. Aleve).  This may be taken at the same time as Acetaminophen.  5.  Heat and ice to the affected area as tolerated for pain control.    6.  Daily low impact exercise such as walking or water exercise was recommended to maintain overall health and aid in weight control.   7.  Follow up as needed for subsequent injections.

## 2024-08-28 ENCOUNTER — TELEPHONE (OUTPATIENT)
Dept: NEUROSURGERY | Facility: CLINIC | Age: 69
End: 2024-08-28
Payer: MEDICARE

## 2024-08-28 NOTE — TELEPHONE ENCOUNTER
HUB CAN READ     LVM for patient to see if we could move his appointment from 9/4 to 9/5 @ 8:45. I asked the patient to return the phone call

## 2024-08-29 NOTE — PROGRESS NOTES
Subjective   Patient ID: Kenneth Jean Baptiste is a 69 y.o. male is here today for follow-up.    I have seen this patient last about 10 months ago.  He has some known cervical disc disease at C5-C6 and C6-C7 which is the source of some chronic neck pain and intermittent radiculopathy down the arms.  He has some additional problems involving the left scapular area and has been working with Dr. Manning a thoracic surgeon from [facility] for possible thoracoscapular bursitis and even a possible mass.  I reviewed his recent notes and it did not appear that there was any kind of surgical intervention or biopsy contemplated.  He says that the Lyrica that helped before was no longer helping him and his primary care physician berg him on gabapentin at 300 mg once a day.  He has diffuse joint pain.  Apparently his PCP thinks that the underlying diagnosis is fibromyalgia.  He is no longer seeing the rheumatologist.  The cervical epidural block that we had ordered and was done in the springtime did help his neck and interscapular pain and he wants to try it again which is certainly fine with me.  I reviewed the MRI done at [facility] earlier this year which did not show any dramatic changes compared to what we had known about last time.  There is nothing from a surgical standpoint but blocks are certainly reasonable and we will set those up.    History of Present Illness    The following portions of the patient's history were reviewed and updated as appropriate: allergies, current medications, past family history, past medical history, past social history, past surgical history, and problem list.    Review of Systems   Constitutional:  Negative for fever.   Musculoskeletal:  Positive for neck pain and neck stiffness. Negative for gait problem.   Neurological:  Positive for numbness. Negative for weakness.   All other systems reviewed and are negative.          Objective     Vitals:    09/04/24 0853   BP: 146/80   BP Location:  "Left arm   Patient Position: Sitting   Cuff Size: Adult   Resp: 20   Weight: 64.9 kg (143 lb)   Height: 172.7 cm (67.99\")   PainSc:   3   PainLoc: Neck     Body mass index is 21.75 kg/m².    Tobacco Use: Low Risk  (9/4/2024)    Patient History     Smoking Tobacco Use: Never     Smokeless Tobacco Use: Never     Passive Exposure: Not on file          Physical Exam  Constitutional:       Appearance: He is well-developed.   HENT:      Head: Normocephalic and atraumatic.   Eyes:      Extraocular Movements: EOM normal.      Conjunctiva/sclera: Conjunctivae normal.      Pupils: Pupils are equal, round, and reactive to light.   Neck:      Vascular: No carotid bruit.   Neurological:      Mental Status: He is oriented to person, place, and time.      Coordination: Finger-Nose-Finger Test and Heel to Shin Test normal.      Gait: Gait is intact.      Deep Tendon Reflexes:      Reflex Scores:       Tricep reflexes are 2+ on the right side and 2+ on the left side.       Bicep reflexes are 2+ on the right side and 2+ on the left side.       Brachioradialis reflexes are 2+ on the right side and 2+ on the left side.       Patellar reflexes are 2+ on the right side and 2+ on the left side.       Achilles reflexes are 2+ on the right side and 2+ on the left side.  Psychiatric:         Speech: Speech normal.       Neurologic Exam     Mental Status   Oriented to person, place, and time.   Registration of memory: Good recent and remote memory.   Attention: normal. Concentration: normal.   Speech: speech is normal   Level of consciousness: alert  Knowledge: consistent with education.     Cranial Nerves     CN II   Visual fields full to confrontation.   Visual acuity: normal    CN III, IV, VI   Pupils are equal, round, and reactive to light.  Extraocular motions are normal.     CN V   Facial sensation intact.   Right corneal reflex: normal  Left corneal reflex: normal    CN VII   Facial expression full, symmetric.   Right facial weakness: " none  Left facial weakness: none    CN VIII   Hearing: intact    CN IX, X   Palate: symmetric    CN XI   Right sternocleidomastoid strength: normal  Left sternocleidomastoid strength: normal    CN XII   Tongue: not atrophic  Tongue deviation: none    Motor Exam   Muscle bulk: normal  Right arm tone: normal  Left arm tone: normal  Right leg tone: normal  Left leg tone: normal    Strength   Strength 5/5 except as noted.     Sensory Exam   Light touch normal.     Gait, Coordination, and Reflexes     Gait  Gait: normal    Coordination   Finger to nose coordination: normal  Heel to shin coordination: normal    Reflexes   Right brachioradialis: 2+  Left brachioradialis: 2+  Right biceps: 2+  Left biceps: 2+  Right triceps: 2+  Left triceps: 2+  Right patellar: 2+  Left patellar: 2+  Right achilles: 2+  Left achilles: 2+  Right : 2+  Left : 2+          Assessment & Plan   Independent Review of Radiographic Studies:      I personally reviewed the images from the following studies.    I reviewed the cervical MRI done on spring 2024 which did not show a whole lot of progression since earlier studies.  He does have cervical stenosis with some foraminal entrapment at C3-C4, C4-C5 on the right and C5-C6 bilaterally.  But no severe cord compression.  Agree with the report.    Medical Decision Making:         Will have him see Dr. Acuña at the pain clinic for more cervical blocks and possibly thoracic blocks.  No surgical indications.  Medicines will be from his PCP.  Will see him in 10 months, sooner if there is a severe flareup.      Diagnoses and all orders for this visit:    1. Chronic neck pain (Primary)  -     Epidural Block    2. Cervical disc disorder at C5-C6 level with radiculopathy  -     Epidural Block    3. Cervical disc disorder at C6-C7 level with radiculopathy  -     Epidural Block    4. Cervical spinal stenosis  -     Epidural Block    5. Chronic midline thoracic back pain  -     Epidural  Block      Return in about 10 months (around 7/4/2025) for face to face.

## 2024-09-04 ENCOUNTER — OFFICE VISIT (OUTPATIENT)
Dept: NEUROSURGERY | Facility: CLINIC | Age: 69
End: 2024-09-04
Payer: MEDICARE

## 2024-09-04 VITALS
HEIGHT: 68 IN | WEIGHT: 143 LBS | SYSTOLIC BLOOD PRESSURE: 146 MMHG | RESPIRATION RATE: 20 BRPM | BODY MASS INDEX: 21.67 KG/M2 | DIASTOLIC BLOOD PRESSURE: 80 MMHG

## 2024-09-04 DIAGNOSIS — G89.29 CHRONIC MIDLINE THORACIC BACK PAIN: ICD-10-CM

## 2024-09-04 DIAGNOSIS — G89.29 CHRONIC NECK PAIN: Primary | ICD-10-CM

## 2024-09-04 DIAGNOSIS — M54.6 CHRONIC MIDLINE THORACIC BACK PAIN: ICD-10-CM

## 2024-09-04 DIAGNOSIS — M50.123 CERVICAL DISC DISORDER AT C6-C7 LEVEL WITH RADICULOPATHY: ICD-10-CM

## 2024-09-04 DIAGNOSIS — M54.2 CHRONIC NECK PAIN: Primary | ICD-10-CM

## 2024-09-04 DIAGNOSIS — M48.02 CERVICAL SPINAL STENOSIS: ICD-10-CM

## 2024-09-04 DIAGNOSIS — M50.122 CERVICAL DISC DISORDER AT C5-C6 LEVEL WITH RADICULOPATHY: ICD-10-CM

## 2024-09-04 PROCEDURE — 99214 OFFICE O/P EST MOD 30 MIN: CPT | Performed by: NEUROLOGICAL SURGERY

## 2024-09-04 PROCEDURE — 1160F RVW MEDS BY RX/DR IN RCRD: CPT | Performed by: NEUROLOGICAL SURGERY

## 2024-09-04 PROCEDURE — 1159F MED LIST DOCD IN RCRD: CPT | Performed by: NEUROLOGICAL SURGERY

## 2024-09-04 RX ORDER — GABAPENTIN 100 MG/1
100 CAPSULE ORAL 3 TIMES DAILY
COMMUNITY
Start: 2024-07-30 | End: 2025-07-30

## 2024-09-04 RX ORDER — LINACLOTIDE 145 UG/1
1 CAPSULE, GELATIN COATED ORAL DAILY
COMMUNITY
Start: 2024-08-01

## 2024-09-04 RX ORDER — DOXYCYCLINE 100 MG/1
1 CAPSULE ORAL EVERY 12 HOURS SCHEDULED
COMMUNITY
Start: 2024-07-17

## 2024-09-04 RX ORDER — METOPROLOL SUCCINATE 25 MG/1
25 TABLET, EXTENDED RELEASE ORAL DAILY
COMMUNITY

## 2024-10-10 ENCOUNTER — ANESTHESIA EVENT (OUTPATIENT)
Dept: PAIN MEDICINE | Facility: HOSPITAL | Age: 69
End: 2024-10-10
Payer: MEDICARE

## 2024-10-10 ENCOUNTER — HOSPITAL ENCOUNTER (OUTPATIENT)
Dept: PAIN MEDICINE | Facility: HOSPITAL | Age: 69
Discharge: HOME | End: 2024-10-10
Payer: MEDICARE

## 2024-10-10 ENCOUNTER — ANESTHESIA (OUTPATIENT)
Dept: PAIN MEDICINE | Facility: HOSPITAL | Age: 69
End: 2024-10-10
Payer: MEDICARE

## 2024-10-10 ENCOUNTER — HOSPITAL ENCOUNTER (OUTPATIENT)
Dept: GENERAL RADIOLOGY | Facility: HOSPITAL | Age: 69
Discharge: HOME | End: 2024-10-10
Payer: MEDICARE

## 2024-10-10 VITALS
RESPIRATION RATE: 16 BRPM | OXYGEN SATURATION: 97 % | SYSTOLIC BLOOD PRESSURE: 125 MMHG | DIASTOLIC BLOOD PRESSURE: 71 MMHG | HEART RATE: 62 BPM | TEMPERATURE: 97.5 F

## 2024-10-10 DIAGNOSIS — R52 PAIN: ICD-10-CM

## 2024-10-10 DIAGNOSIS — M54.12 CERVICAL RADICULOPATHY: Primary | ICD-10-CM

## 2024-10-10 PROCEDURE — 25010000002 DEXAMETHASONE SODIUM PHOSPHATE 10 MG/ML SOLUTION: Performed by: STUDENT IN AN ORGANIZED HEALTH CARE EDUCATION/TRAINING PROGRAM

## 2024-10-10 PROCEDURE — 25510000001 IOPAMIDOL 41 % SOLUTION: Performed by: STUDENT IN AN ORGANIZED HEALTH CARE EDUCATION/TRAINING PROGRAM

## 2024-10-10 PROCEDURE — 77003 FLUOROGUIDE FOR SPINE INJECT: CPT

## 2024-10-10 RX ORDER — MELOXICAM 15 MG/1
15 TABLET ORAL DAILY
COMMUNITY

## 2024-10-10 RX ORDER — MIDAZOLAM HYDROCHLORIDE 1 MG/ML
2 INJECTION INTRAMUSCULAR; INTRAVENOUS AS NEEDED
Status: DISCONTINUED | OUTPATIENT
Start: 2024-10-10 | End: 2024-10-11 | Stop reason: HOSPADM

## 2024-10-10 RX ORDER — IOPAMIDOL 408 MG/ML
12 INJECTION, SOLUTION INTRATHECAL
Status: COMPLETED | OUTPATIENT
Start: 2024-10-10 | End: 2024-10-10

## 2024-10-10 RX ORDER — DEXAMETHASONE SODIUM PHOSPHATE 10 MG/ML
10 INJECTION, SOLUTION INTRAMUSCULAR; INTRAVENOUS ONCE
Status: COMPLETED | OUTPATIENT
Start: 2024-10-10 | End: 2024-10-10

## 2024-10-10 RX ORDER — SODIUM CHLORIDE 0.9 % (FLUSH) 0.9 %
1-10 SYRINGE (ML) INJECTION AS NEEDED
Status: DISCONTINUED | OUTPATIENT
Start: 2024-10-10 | End: 2024-10-11 | Stop reason: HOSPADM

## 2024-10-10 RX ORDER — FENTANYL CITRATE 50 UG/ML
50 INJECTION, SOLUTION INTRAMUSCULAR; INTRAVENOUS AS NEEDED
Status: DISCONTINUED | OUTPATIENT
Start: 2024-10-10 | End: 2024-10-11 | Stop reason: HOSPADM

## 2024-10-10 RX ORDER — LIDOCAINE HYDROCHLORIDE 10 MG/ML
1 INJECTION, SOLUTION INFILTRATION; PERINEURAL ONCE AS NEEDED
Status: DISCONTINUED | OUTPATIENT
Start: 2024-10-10 | End: 2024-10-11 | Stop reason: HOSPADM

## 2024-10-10 RX ADMIN — IOPAMIDOL 10 ML: 408 INJECTION, SOLUTION INTRATHECAL at 10:52

## 2024-10-10 RX ADMIN — DEXAMETHASONE SODIUM PHOSPHATE 10 MG: 10 INJECTION, SOLUTION INTRAMUSCULAR; INTRAVENOUS at 10:52

## 2024-10-10 NOTE — H&P
INTERVAL HISTORY:    The patient returns for another cervical epidural steroid injection #2 today.    They have received at least 75% improvement since their last injection and lasted several months.  His pain has started to return to his neck radiating to his left shoulder and his right mid back a little lower where he has some scoliosis.    Today their pain is a 3 out of 10.  The pain limits the patient's activities of daily living including: Staying active  Conservative measures tried in the interim include: rest, ice, heat OTC medications Lyrica, NSAIDs    Current Outpatient Medications on File Prior to Encounter   Medication Sig Dispense Refill    allopurinol (ZYLOPRIM) 300 MG tablet Take 1 tablet by mouth Daily.      ALPRAZolam (XANAX) 0.5 MG tablet Take 1 tablet by mouth At Night As Needed (SLEEP).      Aspirin-Acetaminophen-Caffeine (EXCEDRIN PO) Take 1 tablet by mouth As Needed.      gabapentin (NEURONTIN) 100 MG capsule Take 1 capsule by mouth 3 (Three) Times a Day.      lactulose (CHRONULAC) 10 GM/15ML solution Take 30 mL by mouth 2 (Two) Times a Day.      lansoprazole (PREVACID) 15 MG capsule Take 1 capsule by mouth Daily.      Linzess 145 MCG capsule capsule Take 1 capsule by mouth Daily.      liothyronine (CYTOMEL) 5 MCG tablet Take 1 tablet by mouth.      Synthroid 88 MCG tablet Take 1 tablet by mouth Daily.      traMADol (ULTRAM) 50 MG tablet Take 1 tablet by mouth Every 6 (Six) Hours As Needed for Moderate Pain.      meloxicam (MOBIC) 15 MG tablet Take 1 tablet by mouth Daily.      metoprolol succinate XL (TOPROL-XL) 25 MG 24 hr tablet Take 1 tablet by mouth Daily.      [DISCONTINUED] doxycycline (MONODOX) 100 MG capsule Take 1 capsule by mouth Every 12 (Twelve) Hours.      [DISCONTINUED] methocarbamol (ROBAXIN) 750 MG tablet Take 1 tablet by mouth 2 (Two) Times a Day As Needed for Muscle Spasms. 60 tablet 3    [DISCONTINUED] oxaprozin (DAYPRO) 600 MG tablet Take 1 tablet by mouth Every 12 (Twelve)  Hours.      [DISCONTINUED] pregabalin (LYRICA) 75 MG capsule Take 1 capsule by mouth 3 (Three) Times a Day.       No current facility-administered medications on file prior to encounter.       Past Medical History:   Diagnosis Date    Arthritis     OSTEO    Avascular necrosis     HISTORY OF BOTH LEFT AND RIGHT HIPS    Bilateral inguinal hernia     Chronic neck pain     Colon polyps     HISTORY OF    Diverticulitis     Diverticulosis     GERD (gastroesophageal reflux disease)     Gout     Hypertension     MED SHORT PERIOD OF TIME. NOTHING RECENTLY    IBS (irritable bowel syndrome)     WITH CONSTIPATION    Insomnia     Loss of sensation     BOWEL.    Palpitation     DURING THE NIGHT TIMES    Peripheral neuropathy     GUSTAVO BALLS OF FEET    Personal history of COVID-19     SEPT 13 2021. RESULT FROM [facility]    PONV (postoperative nausea and vomiting)     Thyroid disease     GOITER REMOVED-VASCULAR MASS. BENIGN,PARTIAL THYROIDECTOMY    Umbilical hernia        No hematologic infectious or constitutional symptoms  Negative screen for SKINNY      Exam:  /87 (BP Location: Left arm, Patient Position: Sitting)   Pulse 67   Temp 36.4 °C (97.5 °F) (Tympanic)   Resp 16   SpO2 100%   Alert and oriented  NCAT  Airway: Mallampati 2  Unlabored respirations  RRR  Extremities WWP    Diagnosis:  Post-Op Diagnosis Codes:     * Cervical radiculopathy [M54.12]    Plan: Cervical 7 epidural steroid injection under fluoroscopic guidance    I have encouraged them to continue:  1.  Physical therapy exercises at home as prescribed by physical therapy or from the pain clinic handout.  Continuation of these exercises every day, or multiple times per week, even when the patient has good pain relief, was stressed to the patient as a preventative measure to decrease the frequency and severity of future pain episodes.  2.  Continue pain medicines as already prescribed.  If patient not currently taking any, it is recommended to begin  Acetaminophen 1000 mg po q 8 hours.  If other medicines containing Acetaminophen are currently prescribed, maintain daily dose at 3000mg.    3.  If they can tolerate NSAIDS, it is recommended to take Ibuprofen 600 mg po q 6 hours for 7 days during pain exacerbations.   Alternatively, they may substitute an NSAID of their choice (e.g. Aleve)  4.  Heat and ice to the affected area as tolerated for pain control.   5.  Low impact exercise such as walking or water exercise was recommended to maintain overall health and aid in weight control.   6.  Follow up as needed for subsequent injections.

## 2024-10-10 NOTE — ANESTHESIA PROCEDURE NOTES
PAIN Epidural block      Patient reassessed immediately prior to procedure    Patient location during procedure: pain clinic  Indication:procedure for pain  Performed By  Anesthesiologist: Prachi Acuña MD  Preanesthetic Checklist  Completed: patient identified, risks and benefits discussed, surgical consent, monitors and equipment checked, pre-op evaluation and timeout performed  Additional Notes  Needle placement guided by fluoroscopy and confirmed with NAN and contrast injection.     Diagnosis:  Post-Op Diagnosis Codes:      Post-Op Diagnosis Codes:     * Cervical radiculopathy [M54.12]    Sedation:  none  Sedation time:  Prep:  Pt Position:prone  Sterile Tech:gloves, sterile barrier and mask  Prep:chlorhexidine gluconate and isopropyl alcohol  Monitoring:EKG, continuous pulse oximetry and blood pressure monitoring  Procedure:Sedation: no     Approach:left paramedian  Guidance: fluoroscopy  Location:cervical  Level:C7-T1  Needle Type:Tuohy  Needle Gauge:20 G  Aspiration:negative  Medications:  Preservative Free Saline:3mL  Isovue:1mL  Comments:Dexamethasone 10 mg in epidural  Post Assessment:  Post-procedure: bandaid.  Pt Tolerance:patient tolerated the procedure well with no apparent complications  Complications:no

## 2024-10-10 NOTE — DISCHARGE INSTRUCTIONS
"Guide To Relieving And Avoiding Neck Pain    Exercise is important to help prevent and treat neck pain.  Good posture, exercise and avoiding injury will help to keep your neck healthy.    When the neck is strained or over worked symptoms may include headache, upper back pain, shoulder pain or arm pain.  Numbness or tingling in the fingers, dizziness or nausea may also occur.    Posture:    Avoid slumping over a desk.  Raise your work (including computer) to eye level to avoid bending at the neck.      Change Position Often:  Changing position prevents overuse of particular muscles.    Sleep On One Pillow:  Using to many pillows or to large of a pillow causes a \"kink\" in you neck.      Move and Exercise:  Living an active lifestyle is an important part of staying healthy.  Be sure to include the exercise to follow specifically for your neck.                    Range of Motion Exercises:  Do these exercises three times a day.  If you experience increased pain stop and contact your physician.  All exercises can be performed sitting or standing.        1.    2.   3.    1.  Place both hands behind you neck.  Gently tilt your neck backward so that you are looking at the ceiling.  Hold for a count of 10.    2.  Look straight facing forward.  Slowly tip your ear toward your right shoulder.  Do not force the motion.  Hold for a count of 10.  Bring head back to starting position and repeat to left side.    3.  Look straight facing forward.  Gently turn your head to the right.  Do not force the motion.  Hold for a count of 10.  Bring head back to starting position and repeat to the left side.    Exercises To Strengthen Muscles:  1.  Look straight facing forward.  Relax your shoulders.  Raise both shoulders toward your ears.  Hold for 3 seconds.       1.       2.   3.      1.  Look straight facing forward.  Relax your shoulders.  Raise both shoulders toward     2.  Raise your ars to your side and bend your elbows.  Squeeze " shoulder blades together as you rotate your arms outward.  Hold for 5 seconds.    3.  Look straight facing forward.  Pull your head straight back.  Do not tip or move your jaw.  Hold for 5 seconds.   EPIDURAL STEROID INJECTION          An epidural steroid injection is a shot of steroid medicine and numbing medicine that is given into the space between the spinal cord and the bones of the back (epidural space).  The injection helps relieve pain by an irritated or swollen nerve root.    TELL YOUR HEALTH CARE PROVIDER ABOUT:  Any allergies you have  All medicines you are taking including any over the counter medicines  Any blood disorders you have  Any surgeries you have had  Any medical conditions you have  Whether you are pregnant or may be pregnant    WHAT ARE THE RISK?  Generally, this is a safe procedure. However,problems may occur, including  Headache  Bleeding  Infection  Allergic Reaction  Nerve Damage    WHAT CAN I EXPECT AFTER THE PROCEDURE?    INJECTION SITE  Remove the Band-Aid/s after 24 hours  Check your injection site every day for signs of infection.  Check for:             Redness             Bleeding (small amt is normal)             Warmth             Pus or bad odor  Some numbness may be experienced for several hours following the procedure.  Avoid using heat on the injection site for 24 hours. You may use ice intermittently if needed by placing a         towel between your skin and the ice bag and using the ice for 20 minutes 2-3 times a day.  Do not take baths, swim or use a hot tub for 24 hours.    ACTIVITY  No strenuous activity for 24 hours then return to normal activity as tolerated.  If your leg is numb, no driving until full sensation and strength has returned.    GENERAL INSTRUCTIONS:  The injection site may feel numb, use ice with caution if numbness is present and no heat for 24 hours or until numbness is gone.   If you have numbness or weakness in your arm or leg, use those areas with  caution until normal sensation returns.  It is not uncommon to notice an increase in discomfort or a change in the location of discomfort for 3-4 days after the procedure.  If discomfort is noticed at the injection site, ice may be            applied to that area for 20 min 2-3 times a day.  Take the pain medicine your physician has prescribed or over the counter pain relievers as long as you do not have any contraindications.  If you are a diabetic, monitor your blood sugar closely.  The steroids used in your procedure may increase your blood sugar level up to 36 hours after the injection.  If your blood sugar is greater than 250, call the physician that helps you monitor your blood sugar.  Keep all follow-up visits as scheduled by your health care provider. This is important.    CONTACT OUR OFFICE IF:  You have any of these signs of infection            -Redness, swelling, or warmth around your injection site.            -Fluid or blood coming from your injection site (small amt of blood is normal)            -Pus or a bad odor from your injection site            -A fever  You develop a severe headache or a stiff neck  You lose control of your bladder or bowel movements      [facility] HOURS   Monday-Friday 7:30 am. - 4:00 pm.  For any problem related to your procedure we can be reached at [phone].  If you experience an emergency with your procedure, call [phone] or go to the emergency room.

## 2024-11-08 ENCOUNTER — OFFICE (AMBULATORY)
Age: 69
End: 2024-11-08

## 2024-11-08 ENCOUNTER — TRANSCRIBE ORDERS (OUTPATIENT)
Dept: ADMINISTRATIVE | Facility: HOSPITAL | Age: 69
End: 2024-11-08
Payer: MEDICARE

## 2024-11-08 ENCOUNTER — OFFICE (AMBULATORY)
Dept: URBAN - METROPOLITAN AREA CLINIC 76 | Facility: CLINIC | Age: 69
End: 2024-11-08

## 2024-11-08 ENCOUNTER — HOSPITAL ENCOUNTER (OUTPATIENT)
Dept: GENERAL RADIOLOGY | Facility: HOSPITAL | Age: 69
Discharge: HOME | End: 2024-11-08
Payer: MEDICARE

## 2024-11-08 VITALS
WEIGHT: 140 LBS | DIASTOLIC BLOOD PRESSURE: 84 MMHG | WEIGHT: 140 LBS | HEIGHT: 68 IN | DIASTOLIC BLOOD PRESSURE: 84 MMHG | HEART RATE: 60 BPM | WEIGHT: 140 LBS | HEART RATE: 60 BPM | OXYGEN SATURATION: 99 % | SYSTOLIC BLOOD PRESSURE: 146 MMHG | WEIGHT: 140 LBS | OXYGEN SATURATION: 99 % | SYSTOLIC BLOOD PRESSURE: 146 MMHG | OXYGEN SATURATION: 99 % | DIASTOLIC BLOOD PRESSURE: 84 MMHG | SYSTOLIC BLOOD PRESSURE: 146 MMHG | OXYGEN SATURATION: 99 % | WEIGHT: 140 LBS | SYSTOLIC BLOOD PRESSURE: 146 MMHG | HEIGHT: 68 IN | OXYGEN SATURATION: 99 % | HEIGHT: 68 IN | HEART RATE: 60 BPM | DIASTOLIC BLOOD PRESSURE: 84 MMHG | HEART RATE: 60 BPM | HEART RATE: 60 BPM | WEIGHT: 140 LBS | OXYGEN SATURATION: 99 % | OXYGEN SATURATION: 99 % | DIASTOLIC BLOOD PRESSURE: 84 MMHG | DIASTOLIC BLOOD PRESSURE: 84 MMHG | WEIGHT: 140 LBS | HEIGHT: 68 IN | DIASTOLIC BLOOD PRESSURE: 84 MMHG | HEART RATE: 60 BPM | HEART RATE: 60 BPM | SYSTOLIC BLOOD PRESSURE: 146 MMHG | SYSTOLIC BLOOD PRESSURE: 146 MMHG | HEIGHT: 68 IN | SYSTOLIC BLOOD PRESSURE: 146 MMHG | HEIGHT: 68 IN | HEIGHT: 68 IN

## 2024-11-08 DIAGNOSIS — K21.9 GASTRO-ESOPHAGEAL REFLUX DISEASE WITHOUT ESOPHAGITIS: ICD-10-CM

## 2024-11-08 DIAGNOSIS — K59.09 OTHER CONSTIPATION: ICD-10-CM

## 2024-11-08 DIAGNOSIS — R10.11 RIGHT UPPER QUADRANT PAIN: ICD-10-CM

## 2024-11-08 DIAGNOSIS — K58.1 IRRITABLE BOWEL SYNDROME WITH CONSTIPATION: ICD-10-CM

## 2024-11-08 DIAGNOSIS — R10.11 ABDOMINAL PAIN, RIGHT UPPER QUADRANT: ICD-10-CM

## 2024-11-08 DIAGNOSIS — K21.9 CHALASIA OF LOWER ESOPHAGEAL SPHINCTER: ICD-10-CM

## 2024-11-08 DIAGNOSIS — R10.11 ABDOMINAL PAIN, RIGHT UPPER QUADRANT: Primary | ICD-10-CM

## 2024-11-08 PROCEDURE — 74018 RADEX ABDOMEN 1 VIEW: CPT

## 2024-11-08 PROCEDURE — 99214 OFFICE O/P EST MOD 30 MIN: CPT

## 2024-11-08 RX ORDER — TENAPANOR HYDROCHLORIDE 53.2 MG/1
100 TABLET ORAL
Qty: 180 | Refills: 3 | Status: ACTIVE
Start: 2024-11-08

## 2024-11-08 RX ORDER — LACTULOSE 10 G/15ML
900 SOLUTION ORAL; RECTAL
Qty: 2700 | Refills: 11 | Status: ACTIVE
Start: 2024-11-08

## 2025-01-08 ENCOUNTER — TELEPHONE (OUTPATIENT)
Dept: NEUROSURGERY | Facility: CLINIC | Age: 70
End: 2025-01-08
Payer: MEDICARE

## 2025-01-08 NOTE — TELEPHONE ENCOUNTER
Patient called stating that he is having severe facial numbness and shooting pain from right side of face down into his neck and shoulder blade. Patient states that he has been putting off surgery for quite awhile because it wasn't severe enough to have surgery but states that this is very concerning now.Patient states that he went to see his PCP today and was told to call our office. Patient was last seen in 9/2024. Please call patient and advise

## 2025-01-08 NOTE — TELEPHONE ENCOUNTER
Left message for patient, per Olga Franco    Please advise the patient that if his symptoms are severe, and he cannot wait for an appointment, he could consider going to the ER for evaluation.

## 2025-01-08 NOTE — TELEPHONE ENCOUNTER
Please advise the patient that if his symptoms are severe, and he cannot wait for an appointment, he could consider going to the ER for evaluation.

## 2025-01-10 NOTE — TELEPHONE ENCOUNTER
"S/w patient and informed per Olga Franco APRN,    \"The patient was just complaining of severe facial pain shooting down into his neck and I advised him to go to the emergency room. I am not comfortable giving an order for physical therapy with these previous complaints. You should be seen in the office first \"    Patient expressed understanding and scheduled an appt with Olga Franco   "

## 2025-01-10 NOTE — TELEPHONE ENCOUNTER
The patient was just complaining of severe facial pain shooting down into his neck and I advised him to go to the emergency room.  I am not comfortable giving an order for physical therapy with these previous complaints.  You should be seen in the office first

## 2025-01-16 NOTE — PROGRESS NOTES
Subjective   Patient ID: Kenneth Jean Baptiste is a 69 y.o. male is here today for follow-up after cervical epidural block.      History of Present Illness    Mr. Jean Baptiste has been following with Dr. Can for well over 10 years.  He has no history of prior cervical spine surgery.  Both the patient and his medical problems are new to me as of today.  The patient was last seen by Dr. Can in the office in September 2024 with new cervical MRI imaging performed in June 2024.  The MRI revealed multilevel degenerative changes and facet arthropathy particularly at C3-4 and posterior disc osteophyte at C4-5 contributing to severe right and mild left neuroforaminal narrowing.  There is no history of cervical canal stenosis or abnormal cord signal.   As of his last visit note with Dr. Can, Mr. Jean Baptiste has a long history of cervical pain, left scapular pain. The left scapular pain has also been evaluated by thoracic cardiovascular surgery due to finding of a left upper posterior rib mass.  He underwent MRI imaging of the shoulder June 2024 revealing that the lesion was noncancerous; more related to bursitis.    The patient had been taking Lyrica prescribed by his primary care provider, Dr. Colón. According to the notes, he has since switched to gabapentin 100 mg 3 times daily.  Patient had noted some improvement with the previous cervical epidural steroid injection when he saw Dr. Can back in September.  He was therefore referred for cervical epidural injection again which was performed October 10, 2024. The patient was due to follow-up with Dr. Can again in June 2025.   The patient contacted the office a week or so ago complaining of severe right sided neck pain radiating up into the right frontal parietal region of his head.  He was also complaining of worsening left-sided facial pain extending from the left ear and into the left jaw as well as into the left trapezius region and left  shoulder blade.  The pain was described as shooting and worsened when going out into cold weather. He was encouraged to go to the emergency room for more immediate evaluation however he declined.  He then contacted the office requesting an order for physical therapy which I did not feel comfortable providing without some sort of clinical evaluation.    Today, Mr. Jean Baptiste states that his pain symptoms are unchanged.  He often finds that in order to get any type of relief in the left facial symptoms he has to bend his head laterally to the right side to get any relief.  He also complains of persistent bilateral tinnitus which is quite unnerving.  Along with the symptoms he also experiences episodes of breaking out in a cold sweat.    The following portions of the patient's history were reviewed and updated as appropriate: allergies, current medications, past family history, past medical history, past social history, past surgical history, and problem list.    Review of Systems   Constitutional:  Positive for activity change and fatigue.   HENT:  Positive for ear pain, hearing loss (Previously evaluated by otolaryngologist who notes some hearing loss) and tinnitus. Negative for rhinorrhea, sinus pain, sore throat and trouble swallowing.         Complains of worsening left facial pain emanating from the external ear down toward the jaw.  The pain is intermittent but severe when it occurs.  It is worsened by temperature change such as walking out into cold air.  He finds that he is able to get minimal but improvement in symptoms if he laterally bends his head to the right when these episodes occur.  He also reports persistent tinnitus in both ears but worse on the left.  Additionally, the patient reports progressively worsening intermittent headaches to the right side of his head and into the occipital cervical region of his neck.  He is taking gabapentin at the recommendation of his primary care provider.  The symptoms  have been going on for the last couple of months and have progressively worsened.  He also reports cold sweats when these episodes occur.   Eyes: Negative.    Respiratory: Negative.  Negative for cough, chest tightness and shortness of breath.    Cardiovascular: Negative.  Negative for chest pain.   Gastrointestinal:  Positive for abdominal pain. Negative for diarrhea, nausea and vomiting.        Reports a previous history of irritable bowel symptoms.  No history of bowel incontinence.   Endocrine: Negative.    Genitourinary:         Denies bladder incontinence.   Musculoskeletal:  Positive for back pain and neck pain.        The patient reports left scapular and upper thoracic pain for a couple of years.  There is a palpable mass in the soft tissues overlying the upper scapular region.  The patient has been evaluated by thoracic cardiovascular surgery.  This has been imaged and there had been discussions about biopsy however the patient has held off on that.  He has undergone previous MRI imaging of the cervical spine which did show cervical degenerative disease and stenosis which was previously evaluated by Dr. Can.  Patient did undergo a cervical epidural steroid injection couple of months ago with some improvement in symptoms.  The patient is scheduled for another injection in a few weeks.  He has since developed more problematic issues with pain in the left side of his face stemming from the ear into the jaw with right sided hemicranial headache and occipital cervical pain on the right side.   Skin: Negative.    Neurological:  Positive for numbness and headaches. Negative for dizziness and tremors.   Psychiatric/Behavioral:  Positive for sleep disturbance. The patient is nervous/anxious.            Objective     Vitals:    01/23/25 1437   BP: 132/76   Pulse: 76   SpO2: 97%   Weight: 67.1 kg (148 lb)   PainSc:   8     Body mass index is 22.51 kg/m².    Tobacco Use: Low Risk  (1/23/2025)    Patient History      Smoking Tobacco Use: Never     Smokeless Tobacco Use: Never     Passive Exposure: Not on file          Physical Exam  Vitals reviewed.   Constitutional:       General: He is not in acute distress.     Appearance: Normal appearance.      Comments: Appears underweight.   HENT:      Head: Normocephalic.      Right Ear: External ear normal.      Left Ear: External ear normal.      Nose: Nose normal.      Mouth/Throat:      Mouth: Mucous membranes are moist.      Pharynx: Oropharynx is clear.   Eyes:      Extraocular Movements: Extraocular movements intact.      Pupils: Pupils are equal, round, and reactive to light.   Cardiovascular:      Rate and Rhythm: Normal rate.   Pulmonary:      Effort: Pulmonary effort is normal. No respiratory distress.   Abdominal:      General: Abdomen is flat. There is no distension.      Palpations: Abdomen is soft.      Tenderness: There is no abdominal tenderness.   Musculoskeletal:         General: Normal range of motion.      Cervical back: Normal range of motion. No rigidity. Tenderness: Mildly tender to palpation right cervico-occipital region.     Right lower leg: No edema.      Left lower leg: No edema.   Skin:     General: Skin is warm and dry.      Findings: Lesion present.      Comments: Palpable raised soft tissue lesion in the left upper scapular region.  No significant tenderness to palpation on today's exam.   Neurological:      Mental Status: He is alert and oriented to person, place, and time.      Sensory: Sensory deficit present.      Motor: No weakness.      Coordination: Coordination normal.      Gait: Gait normal.      Deep Tendon Reflexes: Reflexes normal.      Comments: AA&O x 3.  Speech is normal.  Converses appropriately.  PERRL. EOM's intact. Face symmetric.  Increased tingly discomfort to light touch in the V2 and V3 distributions on the left side of the face.  Tongue midline without fasiculations or atrophy. Hearing is intact bilaterally to finger rustle.  Negative pronator drift.  No dysmetria.  No motor or sensory deficits in the upper or lower extremities.  DTRs 2+ throughout.  Negative Varun's.  Negative clonus.  Gait is normal.       Psychiatric:         Mood and Affect: Mood normal.         Thought Content: Thought content normal.      Comments: The patient is a bit anxious today.     Neurological Exam  Mental Status  Alert. Oriented to person, place, and time.    Cranial Nerves  CN III, IV, VI: Extraocular movements intact bilaterally. Pupils equal round and reactive to light bilaterally.    Gait   Normal gait.    Physical Exam      Results      Independent Review of Radiographic Studies:      I personally reviewed the images from the following studies.    No new radiographic images to review.      Assessment & Plan   Medical Decision Making:      I am seeing Mr. Jean Baptiste in the office today for the above complaints which are new to me as of today.  The patient underwent recent CT imaging of the cervical spine at [facility] this past summer.  I am not entirely certain where to go from here although since he has had some relief in the scapular pain with the epidural steroid injection I encouraged him to go ahead and proceed with the next injection that he has scheduled.    In terms of the right cervical occipital pain, he may benefit from occipital cervical nerve blocks which can be accomplished in the neurology office here at [facility].  Of more pressing concern is the tinnitus, the severe headache and left facial pain symptoms.  I will ask for evaluation with [facility] neurology in regards to this issue as well.  The left facial symptoms may be related to some sort of nerve irritation such as trigeminal neuralgia.  He is also having intermittent cold chills with these episodes which may totally be anxiety related.  I will defer further workup of this issue to the medical neurologist.    I recommend that Mr. Jean Baptiste contact our  office if the neurologic workup reveals no pathology associated with these symptoms.  If the occipital cervical blocks do not help or he continues to have worsening cervical pain, he was encouraged to call our office otherwise I recommend that he keep his previously scheduled appointment with Dr. Can late spring.       Diagnoses and all orders for this visit:    1. Chronic neck pain (Primary)  -     Ambulatory Referral to Neurology    2. Neural foraminal stenosis of cervical spine  -     Ambulatory Referral to Neurology    3. Cervical radiculitis  -     Ambulatory Referral to Neurology    4. Left facial pain  -     Ambulatory Referral to Neurology    5. Cervico-occipital neuralgia of right side  -     Ambulatory Referral to Neurology    6. Intractable episodic paroxysmal hemicrania  -     Ambulatory Referral to Neurology    7. New daily persistent headache      Assessment & Plan      Return for Recheck, Next scheduled follow up, Dr. Can.

## 2025-01-23 ENCOUNTER — OFFICE VISIT (OUTPATIENT)
Dept: NEUROSURGERY | Facility: CLINIC | Age: 70
End: 2025-01-23
Payer: MEDICARE

## 2025-01-23 VITALS
DIASTOLIC BLOOD PRESSURE: 76 MMHG | BODY MASS INDEX: 22.51 KG/M2 | SYSTOLIC BLOOD PRESSURE: 132 MMHG | OXYGEN SATURATION: 97 % | WEIGHT: 148 LBS | HEART RATE: 76 BPM

## 2025-01-23 DIAGNOSIS — G89.29 CHRONIC NECK PAIN: Primary | ICD-10-CM

## 2025-01-23 DIAGNOSIS — M54.12 CERVICAL RADICULITIS: ICD-10-CM

## 2025-01-23 DIAGNOSIS — R51.9 LEFT FACIAL PAIN: ICD-10-CM

## 2025-01-23 DIAGNOSIS — M54.81 CERVICO-OCCIPITAL NEURALGIA OF RIGHT SIDE: ICD-10-CM

## 2025-01-23 DIAGNOSIS — G44.031 INTRACTABLE EPISODIC PAROXYSMAL HEMICRANIA: ICD-10-CM

## 2025-01-23 DIAGNOSIS — M48.02 NEURAL FORAMINAL STENOSIS OF CERVICAL SPINE: ICD-10-CM

## 2025-01-23 DIAGNOSIS — G44.52 NEW DAILY PERSISTENT HEADACHE: ICD-10-CM

## 2025-01-23 DIAGNOSIS — M54.2 CHRONIC NECK PAIN: Primary | ICD-10-CM

## 2025-01-23 RX ORDER — LEVOTHYROXINE SODIUM 100 UG/1
TABLET ORAL
COMMUNITY

## 2025-01-23 RX ORDER — ALLOPURINOL 300 MG/1
90 TABLET ORAL
COMMUNITY

## 2025-01-23 RX ORDER — GABAPENTIN 100 MG/1
1 CAPSULE ORAL 3 TIMES DAILY
COMMUNITY
Start: 2024-07-30 | End: 2025-07-30

## 2025-01-23 RX ORDER — LIOTHYRONINE SODIUM 5 UG/1
TABLET ORAL
COMMUNITY
End: 2025-01-23

## 2025-01-23 RX ORDER — LACTULOSE 10 G/10G
2700 SOLUTION ORAL
COMMUNITY
Start: 2024-11-08 | End: 2025-01-23

## 2025-01-23 RX ORDER — MELOXICAM 15 MG/1
1 TABLET ORAL DAILY PRN
COMMUNITY
Start: 2024-04-05 | End: 2025-01-23

## 2025-01-23 RX ORDER — TRAMADOL HYDROCHLORIDE 50 MG/1
TABLET ORAL
COMMUNITY
End: 2025-01-23

## 2025-01-23 RX ORDER — ALPRAZOLAM 0.5 MG
TABLET ORAL
COMMUNITY

## 2025-01-23 RX ORDER — METOPROLOL SUCCINATE 50 MG/1
50 TABLET, EXTENDED RELEASE ORAL DAILY
COMMUNITY
Start: 2025-01-08 | End: 2026-01-08

## 2025-01-28 ENCOUNTER — HOSPITAL ENCOUNTER (OUTPATIENT)
Dept: GENERAL RADIOLOGY | Facility: HOSPITAL | Age: 70
Discharge: HOME | End: 2025-01-28
Payer: MEDICARE

## 2025-01-28 ENCOUNTER — HOSPITAL ENCOUNTER (OUTPATIENT)
Dept: PAIN MEDICINE | Facility: HOSPITAL | Age: 70
Discharge: HOME | End: 2025-01-28
Payer: MEDICARE

## 2025-01-28 ENCOUNTER — ANESTHESIA (OUTPATIENT)
Dept: PAIN MEDICINE | Facility: HOSPITAL | Age: 70
End: 2025-01-28
Payer: MEDICARE

## 2025-01-28 ENCOUNTER — ANESTHESIA EVENT (OUTPATIENT)
Dept: PAIN MEDICINE | Facility: HOSPITAL | Age: 70
End: 2025-01-28
Payer: MEDICARE

## 2025-01-28 VITALS
SYSTOLIC BLOOD PRESSURE: 143 MMHG | TEMPERATURE: 97.4 F | OXYGEN SATURATION: 97 % | DIASTOLIC BLOOD PRESSURE: 76 MMHG | RESPIRATION RATE: 16 BRPM | HEART RATE: 56 BPM

## 2025-01-28 DIAGNOSIS — R52 PAIN: ICD-10-CM

## 2025-01-28 DIAGNOSIS — M50.123 CERVICAL DISC DISORDER AT C6-C7 LEVEL WITH RADICULOPATHY: Primary | ICD-10-CM

## 2025-01-28 PROCEDURE — 77003 FLUOROGUIDE FOR SPINE INJECT: CPT

## 2025-01-28 PROCEDURE — 25510000001 IOPAMIDOL 41 % SOLUTION: Performed by: ANESTHESIOLOGY

## 2025-01-28 PROCEDURE — 25010000002 DEXAMETHASONE PER 1 MG: Performed by: ANESTHESIOLOGY

## 2025-01-28 PROCEDURE — 25010000002 DEXAMETHASONE SODIUM PHOSPHATE 10 MG/ML SOLUTION: Performed by: ANESTHESIOLOGY

## 2025-01-28 RX ORDER — MIDAZOLAM HYDROCHLORIDE 1 MG/ML
1 INJECTION, SOLUTION INTRAMUSCULAR; INTRAVENOUS AS NEEDED
Status: DISCONTINUED | OUTPATIENT
Start: 2025-01-28 | End: 2025-01-29 | Stop reason: HOSPADM

## 2025-01-28 RX ORDER — LEVOTHYROXINE SODIUM 25 UG/1
25 TABLET ORAL
COMMUNITY

## 2025-01-28 RX ORDER — IOPAMIDOL 408 MG/ML
12 INJECTION, SOLUTION INTRATHECAL
Status: COMPLETED | OUTPATIENT
Start: 2025-01-28 | End: 2025-01-28

## 2025-01-28 RX ORDER — LIDOCAINE HYDROCHLORIDE 10 MG/ML
1 INJECTION, SOLUTION INFILTRATION; PERINEURAL ONCE AS NEEDED
Status: DISCONTINUED | OUTPATIENT
Start: 2025-01-28 | End: 2025-01-29 | Stop reason: HOSPADM

## 2025-01-28 RX ORDER — SODIUM CHLORIDE 0.9 % (FLUSH) 0.9 %
1-10 SYRINGE (ML) INJECTION AS NEEDED
Status: DISCONTINUED | OUTPATIENT
Start: 2025-01-28 | End: 2025-01-29 | Stop reason: HOSPADM

## 2025-01-28 RX ORDER — DEXAMETHASONE SODIUM PHOSPHATE 10 MG/ML
10 INJECTION, SOLUTION INTRAMUSCULAR; INTRAVENOUS ONCE
Status: COMPLETED | OUTPATIENT
Start: 2025-01-28 | End: 2025-01-28

## 2025-01-28 RX ORDER — DEXAMETHASONE SODIUM PHOSPHATE 4 MG/ML
INJECTION, SOLUTION INTRA-ARTICULAR; INTRALESIONAL; INTRAMUSCULAR; INTRAVENOUS; SOFT TISSUE
Status: COMPLETED | OUTPATIENT
Start: 2025-01-28 | End: 2025-01-28

## 2025-01-28 RX ORDER — FENTANYL CITRATE 50 UG/ML
50 INJECTION, SOLUTION INTRAMUSCULAR; INTRAVENOUS AS NEEDED
Status: DISCONTINUED | OUTPATIENT
Start: 2025-01-28 | End: 2025-01-29 | Stop reason: HOSPADM

## 2025-01-28 RX ORDER — LEVOTHYROXINE SODIUM 25 UG/1
5 TABLET ORAL
COMMUNITY

## 2025-01-28 RX ADMIN — IOPAMIDOL 10 ML: 408 INJECTION, SOLUTION INTRATHECAL at 12:36

## 2025-01-28 RX ADMIN — DEXAMETHASONE SODIUM PHOSPHATE 10 MG: 4 INJECTION, SOLUTION INTRA-ARTICULAR; INTRALESIONAL; INTRAMUSCULAR; INTRAVENOUS; SOFT TISSUE at 12:42

## 2025-01-28 RX ADMIN — DEXAMETHASONE SODIUM PHOSPHATE 10 MG: 10 INJECTION, SOLUTION INTRAMUSCULAR; INTRAVENOUS at 12:36

## 2025-01-28 NOTE — ANESTHESIA PROCEDURE NOTES
PAIN Epidural block    Pre-sedation assessment completed: 1/28/2025 12:34 PM    Patient reassessed immediately prior to procedure    Patient location during procedure: pain clinic  Start Time: 1/28/2025 12:35 PM  Stop Time: 1/28/2025 12:42 PM  Indication:at surgeon's request and procedure for pain  Performed By  Anesthesiologist: Fili Basurto MD  Preanesthetic Checklist  Completed: patient identified, risks and benefits discussed, surgical consent, monitors and equipment checked, pre-op evaluation and timeout performed  Additional Notes  Post-Op Diagnosis Codes:     * Cervical radiculopathy [M54.12]    Prep:  Pt Position:prone  Sterile Tech:sterile barrier, mask and gloves  Prep:chlorhexidine gluconate and isopropyl alcohol  Monitoring:blood pressure monitoring, continuous pulse oximetry and EKG  Procedure:Sedation: no     Approach:midline  Guidance: fluoroscopy  Location:cervical  Level:C7-T1  Needle Type:Tuohy  Needle Gauge:20 G  Aspiration:negative  Test Dose:negative  Medications:  Isovue:2mL  Comments:He had modest exacerbation of his posterior neck and ear pain with injection.  Post Assessment:  Pt Tolerance:patient tolerated the procedure well with no apparent complications  Complications:no

## 2025-01-28 NOTE — H&P
[facility]    History and Physical    Patient Name: Kenneth Jean Baptiste  :  1955  MRN:  [id number]  Date of Admission: 2025    Subjective     Patient is a 69 y.o. male presents with chief complaint of chronic neck and shoulder: left pain.  Onset of symptoms was gradual starting several months ago.  Symptoms are associated/aggravated by nothing in particular or activity. Symptoms improve with injection    The following portions of the patients history were reviewed and updated as appropriate: current medications, allergies, past medical history, past surgical history, past family history, past social history, and problem list      Complains of neck and shoulder pain especially into the trapezius on the left.  He had a previous cervical epidural steroid injection which did affect his pain some significantly.  Has an MRI from 2024 which shows multilevel degenerative disc disease with severe right sided nerve neuroforaminal stenosis from C4-5 and C6-7  The skull base posterior fossa and the spinal cord are normal according to the MRI.    He does complain of pain in his jaw and up the posterior aspect of his head as well.  He says that he has some knots in his neck that I was unable to palpate.  He is getting go see a neurologist as he perhaps could have trigeminal neuralgia as well.    Objective     Past Medical History:   Past Medical History:   Diagnosis Date    Arthritis     OSTEO    Avascular necrosis     HISTORY OF BOTH LEFT AND RIGHT HIPS    Bilateral inguinal hernia     Chronic neck pain     Colon polyps     HISTORY OF    Diverticulitis     Diverticulosis     GERD (gastroesophageal reflux disease)     Gout     Hypertension     MED SHORT PERIOD OF TIME. NOTHING RECENTLY    IBS (irritable bowel syndrome)     WITH CONSTIPATION    Insomnia     Loss of sensation     BOWEL.    Palpitation     DURING THE NIGHT TIMES    Peripheral neuropathy     GUSTAVO BALLS OF FEET    Personal history of COVID-19      SEPT 13 2021. RESULT FROM [facility] [place]    PONV (postoperative nausea and vomiting)     Thyroid disease     GOITER REMOVED-VASCULAR MASS. BENIGN,PARTIAL THYROIDECTOMY    Umbilical hernia      Past Surgical History:   Past Surgical History:   Procedure Laterality Date    APPENDECTOMY  03/2013    Dr. Granado    BICEPS TENDON REPAIR Left 08/28/2012    COLONOSCOPY N/A 02/2021    EPIDURAL BLOCK      HAND SURGERY Left     FRACTURED HAND    HIP SURGERY Left 1982    NERVE SURGERY,     HIP SURGERY      LEFT AND RIGHT BONFIGLIO BONE GRAFT 11/82 AND 1/83    INGUINAL HERNIA REPAIR Right 2000    Dr. Mckenna    INGUINAL HERNIA REPAIR Bilateral 12/1/2021    Procedure: BILATERAL INGUINAL HERNIA REPAIR LAPAROSCOPIC WITH DAVINCI ROBOT UMBILICAL HERNIA REPAIR;  Surgeon: Zbigniew Starr MD;  Location: [facility] OR;  Service: Robotics - DaVinci;  Laterality: Bilateral;    ORIF ULNA/RADIUS FRACTURES Right     WITH HARDWARE. SINCE REMOVED    PARTIAL HIP ARTHROPLASTY Left 1984    THYROIDECTOMY, PARTIAL  06/08/2016    TOTAL HIP ARTHROPLASTY REVISION Left     SWITCHED TO FULL TOTAL     Family History:   Family History   Problem Relation Age of Onset    Cancer Mother     Stroke Father     Heart disease Father     Cancer Sister     No Known Problems Daughter     Graves' disease Son     Cancer Maternal Aunt     Malig Hyperthermia Neg Hx      Social History:   Social History     Socioeconomic History    Marital status:    Tobacco Use    Smoking status: Never    Smokeless tobacco: Never   Vaping Use    Vaping status: Never Used   Substance and Sexual Activity    Alcohol use: Yes     Alcohol/week: 7.0 standard drinks of alcohol     Types: 7 Standard drinks or equivalent per week     Comment: 1/day    Drug use: No    Sexual activity: Yes     Partners: Female     Birth control/protection: None       Vital Signs Range for the last 24 hours  Temperature: Temp:  [36.3 °C (97.4 °F)] 36.3 °C (97.4 °F)   Temp Source: Temp src: Infrared    BP: BP: (142)/(83) 142/83   Pulse: Heart Rate:  [55] 55   Respirations: Resp:  [16] 16   SPO2: SpO2:  [94 %] 94 %   O2 Amount (l/min):     O2 Devices Device (Oxygen Therapy): room air   Weight:           --------------------------------------------------------------------------------    Current Outpatient Medications   Medication Sig Dispense Refill    allopurinol (ZYLOPRIM) 300 MG tablet Take 1 tablet by mouth Daily. (Patient not taking: Reported on 1/23/2025)      allopurinol (ZYLOPRIM) 300 MG tablet 90 tablets.      ALPRAZolam (XANAX) 0.5 MG tablet 30      Aspirin-Acetaminophen-Caffeine (EXCEDRIN PO) Take 1 tablet by mouth As Needed.      gabapentin (NEURONTIN) 100 MG capsule Take 1 capsule by mouth 3 (Three) Times a Day. (Patient not taking: Reported on 1/23/2025)      gabapentin (NEURONTIN) 100 MG capsule Take 1 capsule by mouth 3 (Three) Times a Day.      lactulose (CHRONULAC) 10 GM/15ML solution Take 30 mL by mouth 2 (Two) Times a Day.      lansoprazole (PREVACID) 15 MG capsule Take 1 capsule by mouth Daily.      Linzess 145 MCG capsule capsule Take 1 capsule by mouth Daily.      liothyronine (CYTOMEL) 5 MCG tablet Take 1 tablet by mouth.      meloxicam (MOBIC) 15 MG tablet Take 1 tablet by mouth Daily.      metoprolol succinate XL (TOPROL-XL) 50 MG 24 hr tablet Take 1 tablet by mouth Daily.      OMEPRAZOLE 2MG/ML LIQUID capsule      Synthroid 88 MCG tablet Take 1 tablet by mouth Daily.      traMADol (ULTRAM) 50 MG tablet Take 1 tablet by mouth Every 6 (Six) Hours As Needed for Moderate Pain.       No current facility-administered medications for this encounter.       --------------------------------------------------------------------------------  Assessment & Plan      Anesthesia Evaluation     history of anesthetic complications:  PONV  NPO Solid Status: > 8 hours      Pain impairs ability to perform ADLs: Ambulation, Sleeping and Exercise/Activity  Modalities previously tried to control pain with  "limited effectiveness within the last 4-6 weeks: OTC medications     Airway   Mallampati: II  TM distance: >3 FB  Neck ROM: limited  Dental      Pulmonary    (-) wheezes  Cardiovascular     Rhythm: regular    (+) hypertension    PE comment: Radial pulses are palpable bilateral    Neuro/Psych  (+) headaches, numbness, Sensory Deficit  GI/Hepatic/Renal/Endo    (+) GERD, thyroid problem     Musculoskeletal         PE comment: Strength is equal bilaterally.  He is able to extend flex and rotate his neck without any problems.      Abdominal    Substance History      OB/GYN          Other                     Diagnosis and Plan    Treatment Plan  ASA 3   Patient has had previous injection/procedure with 50-75% improvement.   Procedures: Cervical Epidural Steroid Injection(LIDA), With fluoroscopy,      Anesthetic plan and risks discussed with patient.      Discussed with patient risk and benefits of BRITNEY including but not limited to : Bleeding, infection, PDPH, inadvertant spinal anesthetic, worsening pain, hyperglycemia, hypertension, CHF, nerve damage, steroid \"toxicity\" and AVN of hips.  Pt agrees to proceed.    Diagnosis     * Cervical radiculopathy [M54.12]                      "

## 2025-04-10 ENCOUNTER — TELEPHONE (OUTPATIENT)
Dept: NEUROSURGERY | Facility: CLINIC | Age: 70
End: 2025-04-10

## 2025-04-10 NOTE — TELEPHONE ENCOUNTER
[facility] received a fax that requires your attention. The document has been indexed to the patient’s chart for your review.      Reason for sending: NOTIFICATION OF NEWLY INDEXED RECORDS    Documents Description: ENT PROGRESS NOTE    Name of Sender: [facility]    Date Indexed: 04/10/25    Notes (if needed): FOR REVIEW

## 2025-05-02 NOTE — PROGRESS NOTES
[facility] Neurology  Patient ID: Kenneth Jean Baptiste  Age: 69 y.o.   Chief compliant:   Chief Complaint   Patient presents with    Neck Pain     NP       Initial HPI (5/8/2025):    Kenneth Jean Baptiste is a 69 y.o. male with Graves' disease, migraines, cervical spinal stenosis, palpitations, fibromyaglia, IBS-C who presents for evaluation of multiple neurological concerns.    He endorsed facial pain radiating down the left face into his jaw and lip. It lasted for a few months, although stopped in the past several weeks. It would burn in the lip and stabbing in the jaw. Burning would last hours, but shooting was brief. No clear trigger, but noticed it more when relaxed.  He got relief leaning his neck to the right. This was associated with an intense vibration in the ear.    He has experienced years of right sided neck and left shoulder pain. (Dr. Gutierres in 2015). He has ongoing mid back pain. He reports there is a non-tender nodule in both locations. Pain flairs and radiates up his neck or down his arm. It is aggravated with activity. He takes tylenol/Excedrin roughly 3 days a week and tramadol (2-3 times daily 50 mg). He is also taking Gabapentin 100 mg TID (cramps at higher doses). Previously done Lyrica (side effects). Amitriptyline was sedating even at 5 mg. He has been on Zoloft numerous years ago and doesn't recall the side effects. Xanax at night for numerous years. His heart would race at night. Workup was apparently unrevealing and was prescribed Xanax which has helped. Denies any anxiety or depression. His previous job was stressful.     He has numbness into fingers of both hands with tingling. It can be the last 3 digits or first two fingers, but usually occur separately. He feels his hand will swell and can't make a fist. 9/10 in pain intensity at times. Feels his strength is good. He gets a variety of other shooting pains in all extremities feeling like he was hit with a hammer. He has been  diagnosed with fibromyalgia.     Reports memory concerns. He Retired 2020. 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 He feels like issues have progressed since then. He will forget what he was talking about. He organizes his own medications. He lives at home with his dog and wife. He may forget to turn a stove off. He has had more difficulty remembering his cooking recipes. He feels he does well with cognitive tests. He has trouble keeping his train of thought.       Pertinent FHx/Social: 1/2-1 oz bourbon a day. No tobacco use. Retired from engineering. Master's degree in mechanical engineering.      Vitals:    05/08/25 1404   BP: 130/82   Pulse: 76   SpO2: 98%       The following portions of the patient's history were reviewed and updated as appropriate: allergies, current medications, past family history, past medical history, past social history, past surgical history and problem list.    Neurological Exam  Mental Status  Awake, alert and oriented to person, place and time. Recent and remote memory are intact. Speech is normal. Language is fluent with no aphasia. Attention and concentration are normal. Fund of knowledge is appropriate for level of education.    Cranial Nerves  CN II: Visual acuity is normal. Visual fields full to confrontation.  CN III, IV, VI: Extraocular movements intact bilaterally. Normal lids and orbits bilaterally. Pupils equal round and reactive to light bilaterally.  CN V: Facial sensation is normal.  CN VII: Full and symmetric facial movement.  CN IX, X: Palate elevates symmetrically  CN XI: Shoulder shrug strength is normal.  CN XII: Tongue midline without atrophy or fasciculations.    Motor  Normal muscle bulk throughout. Normal muscle tone. Strength is 5/5 in all four extremities except as noted.    Sensory  Light touch is normal in upper and lower extremities. Pinprick is  normal in upper and lower extremities. Vibration is normal in upper and lower extremities.     Reflexes                                            Right                      Left  Brachioradialis                    2+                         2+  Biceps                                 2+                         2+  Triceps                                2+                         2+  Patellar                                2+                         2+  Achilles                                2+                         2+  Right Plantar: downgoing  Left Plantar: downgoing    Right pathological reflexes: Ankle clonus absent.  Left pathological reflexes: Ankle clonus absent.    Coordination    Finger-to-nose, rapid alternating movements and heel-to-shin normal bilaterally without dysmetria.    Gait  Casual gait is normal including stance, stride, and arm swing. Romberg is absent.      Imaging: Radiology report reviewed: MRI cervical and brain 3/2025  Labs reviewed including CBC, BMP, TSH  EMG (11/2/21)- b/l carpal tunnel, left cubital tunnel, chronic evidence of radiculopathy     Assessment/Plan:    Kenneth Jean Baptiste is a 69 y.o. male presenting with concerns regarding generalized body aches, progressive memory changes, as well as a transient period of left facial pain that fortunately has resolved.  His facial pain may have been related to local nerve irritation from his significant muscular tension in his neck as the pain resolved with certain positions of his head.  Multiple symptoms are concerning for possible diagnosis of fibromyalgia or myofascial pain syndrome.  He has multiple tender spots through the paracervical musculature, but none in the extremities or thorax.  His cognitive changes appear largely reassuring with low suspicion for a progressive neurodegenerative condition.  More likely this is related to his chronic pain and his chronic benzodiazepine use.  Will follow-up with neuropsychology evaluation  to help clarify his baseline.  Discussed at length regarding medication options including neuropathic pain agents which he has not tolerated and has had tolerability issues with multiple antidepressants.  He is not interested in additional medication trial at this time, but is agreeable to try physical therapy regarding his spots of point tenderness through his neck and shoulders.         Diagnoses and all orders for this visit:    1. Generalized body aches (Primary)  -     Sedimentation Rate  -     C-reactive Protein  -     CK  -     Glutamic Acid Decarboxylase  -     Ambulatory Referral to Physical Therapy for Evaluation & Treatment    2. Cognitive changes  -     Ambulatory Referral to Neuropsychology    3. Abnormal increased muscle tightness  -     Ambulatory Referral to Physical Therapy for Evaluation & Treatment    4. Chronic bilateral thoracic back pain  -     Ambulatory Referral to Physical Therapy for Evaluation & Treatment         Plan:  Labs  Neuropsychology  Physical therapy for neck pain    Leonel Gamez MD            I spent 53 minutes caring for this patient on this date of service. This time includes time spent by me in the following activities as necessary: preparing for the visit, reviewing tests, medical records and previous visits, obtaining and/or reviewing a separately obtained history, performing a medically appropriate exam and/or evaluation, counseling and educating the patient, and/or communicating with other healthcare professionals, documenting information in the medical record, independently interpreting results and communicating that information with the patient, and developing a medically appropriate treatment plan with consideration of other conditions, medications, and treatments.

## 2025-05-08 ENCOUNTER — HOSPITAL ENCOUNTER (OUTPATIENT)
Dept: GENERAL RADIOLOGY | Facility: HOSPITAL | Age: 70
Discharge: HOME | End: 2025-05-08
Admitting: FAMILY MEDICINE
Payer: MEDICARE

## 2025-05-08 ENCOUNTER — OFFICE VISIT (OUTPATIENT)
Dept: NEUROLOGY | Facility: CLINIC | Age: 70
End: 2025-05-08
Payer: MEDICARE

## 2025-05-08 VITALS
OXYGEN SATURATION: 98 % | SYSTOLIC BLOOD PRESSURE: 130 MMHG | BODY MASS INDEX: 22.61 KG/M2 | WEIGHT: 149.2 LBS | HEART RATE: 76 BPM | DIASTOLIC BLOOD PRESSURE: 82 MMHG | HEIGHT: 68 IN

## 2025-05-08 DIAGNOSIS — R52 GENERALIZED BODY ACHES: Primary | ICD-10-CM

## 2025-05-08 DIAGNOSIS — G89.29 CHRONIC BILATERAL THORACIC BACK PAIN: ICD-10-CM

## 2025-05-08 DIAGNOSIS — M41.34 THORACOGENIC SCOLIOSIS OF THORACIC REGION: ICD-10-CM

## 2025-05-08 DIAGNOSIS — M54.6 CHRONIC BILATERAL THORACIC BACK PAIN: ICD-10-CM

## 2025-05-08 DIAGNOSIS — M62.89 ABNORMAL INCREASED MUSCLE TIGHTNESS: ICD-10-CM

## 2025-05-08 DIAGNOSIS — R41.89 COGNITIVE CHANGES: ICD-10-CM

## 2025-05-08 PROCEDURE — 72072 X-RAY EXAM THORAC SPINE 3VWS: CPT

## 2025-05-08 RX ORDER — KETOCONAZOLE 20 MG/ML
SHAMPOO, SUSPENSION TOPICAL
COMMUNITY

## 2025-05-08 RX ORDER — CYCLOSPORINE 0.5 MG/ML
EMULSION OPHTHALMIC
COMMUNITY

## 2025-05-08 NOTE — LETTER
May 8, 2025     No Recipients    Patient: Kenneth Jean Baptiste   YOB: 1955   Date of Visit: 5/8/2025     Dear OCTAVIA Daugherty:       Thank you for referring Kenneth Jean Baptiste to me for evaluation. Below are the relevant portions of my assessment and plan of care.    If you have questions, please do not hesitate to call me. I look forward to following Kenneth along with you.         Sincerely,        Leonel Gamez MD        CC: No Recipients

## 2025-05-08 NOTE — LETTER
May 8, 2025     No Recipients    Patient: Kenneth Jean Baptiste   YOB: 1955   Date of Visit: 5/8/2025     Dear Gerard Miller MD:       Thank you for referring Kenneth Jean Baptiste to me for evaluation. Below are the relevant portions of my assessment and plan of care.    If you have questions, please do not hesitate to call me. I look forward to following Kenneth along with you.         Sincerely,        Leonel Gamez MD        CC: No Recipients

## 2025-05-09 ENCOUNTER — OFFICE (AMBULATORY)
Dept: URBAN - METROPOLITAN AREA CLINIC 76 | Facility: CLINIC | Age: 70
End: 2025-05-09
Payer: MEDICARE

## 2025-05-09 VITALS
OXYGEN SATURATION: 96 % | DIASTOLIC BLOOD PRESSURE: 72 MMHG | SYSTOLIC BLOOD PRESSURE: 118 MMHG | HEIGHT: 68 IN | WEIGHT: 149 LBS | HEART RATE: 71 BPM

## 2025-05-09 DIAGNOSIS — R10.84 GENERALIZED ABDOMINAL PAIN: ICD-10-CM

## 2025-05-09 DIAGNOSIS — K59.09 OTHER CONSTIPATION: ICD-10-CM

## 2025-05-09 DIAGNOSIS — K21.9 GASTRO-ESOPHAGEAL REFLUX DISEASE WITHOUT ESOPHAGITIS: ICD-10-CM

## 2025-05-09 PROCEDURE — 99213 OFFICE O/P EST LOW 20 MIN: CPT

## 2025-05-10 LAB
CK SERPL-CCNC: 38 U/L (ref 20–200)
CRP SERPL-MCNC: 0.3 MG/DL (ref 0–0.5)
ERYTHROCYTE [SEDIMENTATION RATE] IN BLOOD BY WESTERGREN METHOD: <1 MM/HR (ref 0–20)
GAD65 AB SER IA-ACNC: <5 U/ML (ref 0–5)

## 2025-05-12 ENCOUNTER — PATIENT ROUNDING (BHMG ONLY) (OUTPATIENT)
Dept: NEUROLOGY | Facility: CLINIC | Age: 70
End: 2025-05-12
Payer: MEDICARE

## 2025-05-14 ENCOUNTER — APPOINTMENT (OUTPATIENT)
Dept: PHYSICAL THERAPY | Facility: HOSPITAL | Age: 70
End: 2025-05-14
Payer: MEDICARE

## 2025-05-14 ENCOUNTER — HOSPITAL ENCOUNTER (OUTPATIENT)
Dept: PHYSICAL THERAPY | Facility: HOSPITAL | Age: 70
Setting detail: THERAPIES SERIES
Discharge: HOME | End: 2025-05-14
Payer: MEDICARE

## 2025-05-14 DIAGNOSIS — R29.3 POOR POSTURE: ICD-10-CM

## 2025-05-14 DIAGNOSIS — G89.29 CHRONIC NECK PAIN: Primary | ICD-10-CM

## 2025-05-14 DIAGNOSIS — M54.2 CHRONIC NECK PAIN: Primary | ICD-10-CM

## 2025-05-14 PROCEDURE — 97162 PT EVAL MOD COMPLEX 30 MIN: CPT

## 2025-05-21 ENCOUNTER — HOSPITAL ENCOUNTER (OUTPATIENT)
Dept: PHYSICAL THERAPY | Facility: HOSPITAL | Age: 70
Setting detail: THERAPIES SERIES
Discharge: HOME | End: 2025-05-21
Payer: MEDICARE

## 2025-05-21 DIAGNOSIS — M54.2 CHRONIC NECK PAIN: Primary | ICD-10-CM

## 2025-05-21 DIAGNOSIS — R29.3 POOR POSTURE: ICD-10-CM

## 2025-05-21 DIAGNOSIS — G89.29 CHRONIC NECK PAIN: Primary | ICD-10-CM

## 2025-05-21 PROCEDURE — 97140 MANUAL THERAPY 1/> REGIONS: CPT

## 2025-05-21 PROCEDURE — 97110 THERAPEUTIC EXERCISES: CPT

## 2025-05-21 NOTE — THERAPY TREATMENT NOTE
Outpatient Physical Therapy Ortho Treatment Note  [facility]     Patient Name: Kenneth Jean Baptiste  : 1955  MRN: [id number]  Today's Date: 2025      Visit Date: 2025    Visit Dx:    ICD-10-CM ICD-9-CM   1. Chronic neck pain  M54.2 723.1    G89.29 338.29   2. Poor posture  R29.3 781.92       Patient Active Problem List   Diagnosis    Degeneration of intervertebral disc of mid-cervical region    Left facial numbness    Migraine without aura and without status migrainosus, not intractable    Graves' disease    Postoperative hypothyroidism    Heart palpitations    Chest pain    Thrombocytopenia    Bilateral leg weakness    Cervical disc disorder at C5-C6 level with radiculopathy    Chronic bilateral thoracic back pain    DDD (degenerative disc disease), lumbar    Chronic neck pain    Neck muscle spasm    Scapular dysfunction    Abnormal MRI, shoulder    Cervical spinal stenosis    Cervical disc disorder at C6-C7 level with radiculopathy        Past Medical History:   Diagnosis Date    Arthritis     OSTEO    Avascular necrosis     HISTORY OF BOTH LEFT AND RIGHT HIPS    Bilateral inguinal hernia     Chronic neck pain     Colon polyps     HISTORY OF    Diverticulitis     Diverticulosis     GERD (gastroesophageal reflux disease)     Gout     Hypertension     MED SHORT PERIOD OF TIME. NOTHING RECENTLY    IBS (irritable bowel syndrome)     WITH CONSTIPATION    Insomnia     Loss of sensation     BOWEL.    Palpitation     DURING THE NIGHT TIMES    Peripheral neuropathy     GUSTAVO BALLS OF FEET    Personal history of COVID-19     2021. RESULT FROM [facility] [place]    PONV (postoperative nausea and vomiting)     Thyroid disease     GOITER REMOVED-VASCULAR MASS. BENIGN,PARTIAL THYROIDECTOMY    Umbilical hernia         Past Surgical History:   Procedure Laterality Date    APPENDECTOMY  2013    Dr. Granado    BICEPS TENDON REPAIR Left 2012    COLONOSCOPY N/A 2021    EPIDURAL BLOCK      HAND SURGERY  Left     FRACTURED HAND    HIP SURGERY Left 1982    NERVE SURGERY,     HIP SURGERY      LEFT AND RIGHT BONFIGLIO BONE GRAFT 11/82 AND 1/83    INGUINAL HERNIA REPAIR Right 2000    Dr. Mckenna    INGUINAL HERNIA REPAIR Bilateral 12/1/2021    Procedure: BILATERAL INGUINAL HERNIA REPAIR LAPAROSCOPIC WITH [employer] ROB[employer] UMBILICAL HERNIA REPAIR;  Surgeon: Zbigniew Starr MD;  Location: [facility];  Service: Robotics - [employer];  Laterality: Bilateral;    ORIF ULNA/RADIUS FRACTURES Right     WITH HARDWARE. SINCE REMOVED    PARTIAL HIP ARTHROPLASTY Left 1984    THYROIDECTOMY, PARTIAL  06/08/2016    TOTAL HIP ARTHROPLASTY REVISION Left     SWITCHED TO FULL TOTAL                        PT Assessment/Plan       Row Name 05/21/25 0800          PT Assessment    Assessment Comments Mr. Jean Baptiste returns to PT for first f/u after initial evaluation for L sided neck/shoulder blade and mid back pain with reports of no change in symptoms. Initiated several postural stretches/exercises including chin tucks, UT/LS stretches, door stretch, seated thoracic extension, qped cat/cow and SL thoracic rotation. He required intermittent cues to improve form and benefits from modifications to improve patient tolerance. Verbally discussed seated scapular retraction throughout his day to reduce muscle tension. Patient spouse being seen by oncologist surgeon later today and he may need to reschedule upcoming appts based on his wifes medical status. Therefore, updated and provided HEP and reviewed in detail. Kenneth Jean Baptiste remains a candidate for skilled PT.  -JS        PT Plan    PT Plan Comments response to last session, did wife surgery get scheduled? adjust appts as needed, consider shoulder row/ext, HA, D2 with cervical follow and continue with manual if beneficial  -ISA               User Key  (r) = Recorded By, (t) = Taken By, (c) = Cosigned By      Initials Name Provider Type    Julee Garcia, PT Physical Therapist                        OP Exercises       Row Name 05/21/25 0700             Subjective    Subjective Comments I am really stiff today. I am having neck, shoulder blade and arm pain into my biceps into both of my arms  -JS         Subjective Pain    Able to rate subjective pain? yes  -JS      Pre-Treatment Pain Level 4  -JS         Total Minutes    73751 - PT Therapeutic Exercise Minutes 25  -JS      05517 - PT Manual Therapy Minutes 15  -JS         Exercise 1    Exercise Name 1 UBE  -JS      Time 1 4 mins  -JS         Exercise 2    Exercise Name 2 seated UT/LS stretches  -JS      Cueing 2 Verbal;Demo  -JS      Reps 2 3b  -JS      Time 2 20s  -JS         Exercise 3    Exercise Name 3 door stretch  -JS      Cueing 3 Verbal;Demo  -JS      Reps 3 4  -JS      Time 3 20s  -JS         Exercise 4    Exercise Name 4 seated chin tucks  -JS      Cueing 4 Verbal;Demo  -JS      Sets 4 2  -JS      Reps 4 10  -JS      Time 4 5s  -JS         Exercise 5    Exercise Name 5 seated thoracic extension  -JS      Cueing 5 Verbal;Demo  -JS      Sets 5 1  -JS      Reps 5 20  -JS      Time 5 arms across chest resting on opposite shoulders  -JS         Exercise 6    Exercise Name 6 SL thoracic rotation  -JS      Cueing 6 Verbal;Tactile  -JS      Sets 6 1  -JS      Reps 6 15e  -JS      Time 6 3s  -JS      Additional Comments with cervical follow to comfortable range  -JS         Exercise 7    Exercise Name 7 cat/cow  -JS      Cueing 7 Verbal;Demo  -JS      Sets 7 2  -JS      Reps 7 10  -JS         Exercise 8    Exercise Name 8 seated scapular retraction  -JS      Additional Comments verbally discussed for HEP  -JS                User Key  (r) = Recorded By, (t) = Taken By, (c) = Cosigned By      Initials Name Provider Type    Julee Garcia, PT Physical Therapist                             Manual Rx (Last 36 Hours)       Manual Treatments       Row Name 05/21/25 0700             Total Minutes    30213 - PT Manual Therapy Minutes 15  -JS          Manual Rx 1    Manual Rx 1 Location cervical distraction with suboccipital STM  -JS         Manual Rx 2    Manual Rx 2 Location STM L UT/LS, suboccipitals  -JS         Manual Rx 3    Manual Rx 3 Location mid cervical upglides (inc time spent on R > L)  -JS                User Key  (r) = Recorded By, (t) = Taken By, (c) = Cosigned By      Initials Name Provider Type    JS Julee Ramírez, PT Physical Therapist                     PT OP Goals       Row Name 05/21/25 0700          PT Short Term Goals    STG Date to Achieve 06/13/25  -JS     STG 1 Pt will be independent with initial HEP and postural awareness to improve pain and symptoms.  -JS     STG 1 Progress Ongoing  -     STG 2 Pt to be educated in/verbalize understanding of the importance of posture/ergonomics in association with their condition to facilitate self management of their condition.  -JS     STG 2 Progress Ongoing  -     STG 3 Pt will improve cervical L rotation AROM from 38 degrees to 45 degrees to improve ability to drive safely.  -     STG 3 Progress Ongoing  -     STG 4 Patient will report 50% reduction in mid thoracic pain leading to greater ease with ADLs, functional tasks and yard work.  -     STG 4 Progress Ongoing  -        Long Term Goals    LTG Date to Achieve 07/13/25  -JS     LTG 1 Pt will be independent with advance HEP and postural awareness for continued management of pain and symptoms.  -     LTG 1 Progress Ongoing  -JS     LTG 2 Pt will improve cervical L rotation AROM from 38 degrees to 50 degrees to improve ability to drive safely.  -     LTG 2 Progress Ongoing  -JS     LTG 3 Patient will report 75% reduction in mid thoracic pain leading to greater ease with ADLs, functional tasks and yard work.  -     LTG 3 Progress Ongoing  -     LTG 4 Patient will report reduction in headache frequency from 1x/wk to </= 0 headaches per week over the last four weekend to indicate reduction in suboccipital muscle tension  and improved posture.  -JS     LTG 4 Progress Ongoing  -JS     LTG 5 Patient will report pain at 7/10 at worst compared to 9/10 at worst at initial evaluation to indicate reduction in pain intensity and improved activity tolerance.  -     LTG 5 Progress Ongoing  -JS               User Key  (r) = Recorded By, (t) = Taken By, (c) = Cosigned By      Initials Name Provider Type    Julee Garcia, PT Physical Therapist                    Therapy Education  Education Details: updated HEP Access Code [email or web address]  Given: HEP, Symptoms/condition management, Pain management, Posture/body mechanics  Program: Reinforced, Progressed  How Provided: Verbal, Demonstration, Written  Provided to: Patient  Level of Understanding: Verbalized, Demonstrated              Time Calculation:   Start Time: 0750  Stop Time: 0830  Time Calculation (min): 40 min  Timed Charges  02282 - PT Therapeutic Exercise Minutes: 25  05586 - PT Manual Therapy Minutes: 15  Total Minutes  Timed Charges Total Minutes: 40   Total Minutes: 40  Therapy Charges for Today       Code Description Service Date Service Provider Modifiers Qty    59317280226  PT THER PROC EA 15 MIN 5/21/2025 Julee Ramírez, PT GP 2    [id number]  PT MANUAL THERAPY EA 15 MIN 5/21/2025 Julee Ramírez, PT GP 1                      Julee Ramírez PT  5/21/2025

## 2025-05-27 ENCOUNTER — HOSPITAL ENCOUNTER (OUTPATIENT)
Dept: PHYSICAL THERAPY | Facility: HOSPITAL | Age: 70
Setting detail: THERAPIES SERIES
Discharge: HOME | End: 2025-05-27
Payer: MEDICARE

## 2025-05-27 DIAGNOSIS — R29.3 POOR POSTURE: ICD-10-CM

## 2025-05-27 DIAGNOSIS — M54.2 CHRONIC NECK PAIN: Primary | ICD-10-CM

## 2025-05-27 DIAGNOSIS — G89.29 CHRONIC NECK PAIN: Primary | ICD-10-CM

## 2025-05-27 PROCEDURE — 97110 THERAPEUTIC EXERCISES: CPT

## 2025-05-27 PROCEDURE — 97140 MANUAL THERAPY 1/> REGIONS: CPT

## 2025-05-27 NOTE — THERAPY TREATMENT NOTE
Outpatient Physical Therapy Ortho Treatment Note  [facility]     Patient Name: Kenneth Jean Baptiste  : 1955  MRN: [id number]  Today's Date: 2025      Visit Date: 2025    Visit Dx:    ICD-10-CM ICD-9-CM   1. Chronic neck pain  M54.2 723.1    G89.29 338.29   2. Poor posture  R29.3 781.92       Patient Active Problem List   Diagnosis    Degeneration of intervertebral disc of mid-cervical region    Left facial numbness    Migraine without aura and without status migrainosus, not intractable    Graves' disease    Postoperative hypothyroidism    Heart palpitations    Chest pain    Thrombocytopenia    Bilateral leg weakness    Cervical disc disorder at C5-C6 level with radiculopathy    Chronic bilateral thoracic back pain    DDD (degenerative disc disease), lumbar    Chronic neck pain    Neck muscle spasm    Scapular dysfunction    Abnormal MRI, shoulder    Cervical spinal stenosis    Cervical disc disorder at C6-C7 level with radiculopathy        Past Medical History:   Diagnosis Date    Arthritis     OSTEO    Avascular necrosis     HISTORY OF BOTH LEFT AND RIGHT HIPS    Bilateral inguinal hernia     Chronic neck pain     Colon polyps     HISTORY OF    Diverticulitis     Diverticulosis     GERD (gastroesophageal reflux disease)     Gout     Hypertension     MED SHORT PERIOD OF TIME. NOTHING RECENTLY    IBS (irritable bowel syndrome)     WITH CONSTIPATION    Insomnia     Loss of sensation     BOWEL.    Palpitation     DURING THE NIGHT TIMES    Peripheral neuropathy     GUSTAVO BALLS OF FEET    Personal history of COVID-19     2021. [facility] [place]    PONV (postoperative nausea and vomiting)     Thyroid disease     GOITER REMOVED-VASCULAR MASS. BENIGN,PARTIAL THYROIDECTOMY    Umbilical hernia         Past Surgical History:   Procedure Laterality Date    APPENDECTOMY  2013    Dr. Granado    BICEPS TENDON REPAIR Left 2012    COLONOSCOPY N/A 2021    EPIDURAL BLOCK      HAND SURGERY  "Left     FRACTURED HAND    HIP SURGERY Left 1982    NERVE SURGERY,     HIP SURGERY      LEFT AND RIGHT BONFIGLIO BONE GRAFT 11/82 AND 1/83    INGUINAL HERNIA REPAIR Right 2000    Dr. Mckenna    INGUINAL HERNIA REPAIR Bilateral 12/1/2021    Procedure: BILATERAL INGUINAL HERNIA REPAIR LAPAROSCOPIC WITH [employer]MB[employer] HERNIA REPAIR;  Surgeon: Zbigniew Starr MD;  Location: [facility];  Service: Robotics - [employer];  Laterality: Bilateral;    ORIF ULNA/RADIUS FRACTURES Right     WITH HARDWARE. SINCE REMOVED    PARTIAL HIP ARTHROPLASTY Left 1984    THYROIDECTOMY, PARTIAL  06/08/2016    TOTAL HIP ARTHROPLASTY REVISION Left     SWITCHED TO FULL TOTAL                        PT Assessment/Plan       Row Name 05/27/25 1500          PT Assessment    Assessment Comments Mr. Jean Baptiste is a 69 year old male returning for physical therapy treatment of L sided shoulder blade pain, mid thoracic pain. Today, pt. Notes that following last session he was \"bent over in the parking lot, nervous that I was not going to make it home.\" He notes the pain was so severe, and only remedied with foam rolling and time in his hot tub following session. He notes that he is able to do planks and push ups without any pain, however his HEP flares him after 3 reps of the first three exercises (UT/LS stretch, doorway stretch). Today, he has trigger point tenderness to R sided rhomboids, guarding and tenderness to PA's through mid thoracic spine and severe pain when retracting R shoulder blade. Modified therex to include low position doorway stretch, bent arm to s/l open books, beach chair stretch in supine rather than thoracic extension over chair. He notes that he gets some relief, however is still confused as to why he can do higher level mobility without pain. Performed manual to T/s and posterior scapular musculature with some relief. Asked patient to perform cat cow, and with head on view, there is significant rise of R scapula. " He then performs forearm plank and there is no difference in height of scapular musculature, and pt. Demonstrates good level posture throughout positioning. Encouraged pt to follow up with moist heat at home, and to re-try updated therex. Will continue to follow, and pt. Remains a good candidate for skilled PT.  -ER        PT Plan    PT Plan Comments How was last visit? Pt. appears to have pain with active R scap retraction, but not passive retraction (plank/low doorway stretch)... consider continuuing thoracic mobs, TRX push up?... follow up again about scheduling due to wife's surgery  -ER               User Key  (r) = Recorded By, (t) = Taken By, (c) = Cosigned By      Initials Name Provider Type    ER Meenakshi Brooke, PT Physical Therapist                       OP Exercises       Row Name 05/27/25 1500 05/27/25 1400          Subjective    Subjective Comments Last time I left I was bent over in pain, the top 3 exercises on my program made me sore for 1.5 days  -ER --        Subjective Pain    Able to rate subjective pain? yes  -ER --     Pre-Treatment Pain Level 5  -ER --     Post-Treatment Pain Level 5  -ER --        Total Minutes    22092 - PT Therapeutic Exercise Minutes 25  -ER --     83751 - PT Manual Therapy Minutes 15  -ER --        Exercise 1    Exercise Name 1 UBE  -ER UBE  -ER     Time 1 4 mins  -ER 4 mins  -ER        Exercise 3    Exercise Name 3 door stretch  -ER --     Cueing 3 Verbal;Demo  -ER --     Sets 3 1  -ER --     Reps 3 4  -ER --     Time 3 20sec  -ER --     Additional Comments low position  -ER --        Exercise 4    Exercise Name 4 supine chin tucks  -ER --     Cueing 4 Verbal;Demo  -ER --     Sets 4 2  -ER --     Reps 4 10  -ER --     Time 4 5s  -ER --        Exercise 5    Exercise Name 5 supine beach chair stretch  -ER --     Cueing 5 Verbal;Demo  -ER --     Sets 5 1  -ER --     Reps 5 20  -ER --     Time 5 arms across chest resting on opposite shoulders  -ER --        Exercise 6     Exercise Name 6 SL thoracic rotation  -ER --     Cueing 6 Verbal;Tactile  -ER --     Sets 6 1  -ER --     Reps 6 15e  -ER --     Time 6 3s  -ER --     Additional Comments cervical follow, bent arm  -ER --        Exercise 7    Exercise Name 7 cat/cow  -ER --     Cueing 7 Verbal;Demo  -ER --     Reps 7 5  -ER --     Additional Comments form assessment  -ER --        Exercise 8    Exercise Name 8 foam roll at end of session  -ER --     Time 8 3 minutes  -ER --               User Key  (r) = Recorded By, (t) = Taken By, (c) = Cosigned By      Initials Name Provider Type    ER Mendy, Meenakshi, PT Physical Therapist                             Manual Rx (Last 36 Hours)       Manual Treatments       Row Name 05/27/25 1500             Total Minutes    75265 - PT Manual Therapy Minutes 15  -ER         Manual Rx 1    Manual Rx 1 Location Thoracic PAs, grd 1-2, STM to rhomboids  -ER      Manual Rx 1 Grade 1-2  -ER      Manual Rx 1 Duration 15 minutes  -ER                User Key  (r) = Recorded By, (t) = Taken By, (c) = Cosigned By      Initials Name Provider Type    ER Meenakshi Brooke, PT Physical Therapist                     PT OP Goals       Row Name 05/27/25 1500          PT Short Term Goals    STG Date to Achieve 06/13/25  -ER     STG 1 Pt will be independent with initial HEP and postural awareness to improve pain and symptoms.  -ER     STG 1 Progress Ongoing  -ER     STG 2 Pt to be educated in/verbalize understanding of the importance of posture/ergonomics in association with their condition to facilitate self management of their condition.  -ER     STG 2 Progress Ongoing  -ER     STG 3 Pt will improve cervical L rotation AROM from 38 degrees to 45 degrees to improve ability to drive safely.  -ER     STG 3 Progress Ongoing  -ER     STG 4 Patient will report 50% reduction in mid thoracic pain leading to greater ease with ADLs, functional tasks and yard work.  -ER     STG 4 Progress Ongoing  -ER        Long Term Goals    LTG Date  to Achieve 07/13/25  -ER     LTG 1 Pt will be independent with advance HEP and postural awareness for continued management of pain and symptoms.  -ER     LTG 1 Progress Ongoing  -ER     LTG 2 Pt will improve cervical L rotation AROM from 38 degrees to 50 degrees to improve ability to drive safely.  -ER     LTG 2 Progress Ongoing  -ER     LTG 3 Patient will report 75% reduction in mid thoracic pain leading to greater ease with ADLs, functional tasks and yard work.  -ER     LTG 3 Progress Ongoing  -ER     LTG 4 Patient will report reduction in headache frequency from 1x/wk to </= 0 headaches per week over the last four weekend to indicate reduction in suboccipital muscle tension and improved posture.  -ER     LTG 4 Progress Ongoing  -ER     LTG 5 Patient will report pain at 7/10 at worst compared to 9/10 at worst at initial evaluation to indicate reduction in pain intensity and improved activity tolerance.  -ER     LTG 5 Progress Ongoing  -ER               User Key  (r) = Recorded By, (t) = Taken By, (c) = Cosigned By      Initials Name Provider Type    ER Meenakshi Brooke PT Physical Therapist                    Therapy Education  Education Details: modifications to therex  Given: HEP, Symptoms/condition management, Pain management, Posture/body mechanics  Program: Reinforced, Modified  How Provided: Verbal, Demonstration  Provided to: Patient  Level of Understanding: Teach back education performed, Verbalized, Demonstrated              Time Calculation:   Start Time: 1445  Stop Time: 1525  Time Calculation (min): 40 min  Total Timed Code Minutes- PT: 40 minute(s)  Timed Charges  73759 - PT Therapeutic Exercise Minutes: 25  70605 - PT Manual Therapy Minutes: 15  Total Minutes  Timed Charges Total Minutes: 40   Total Minutes: 40  Therapy Charges for Today       Code Description Service Date Service Provider Modifiers Qty    60225804757 HC PT THER PROC EA 15 MIN 5/27/2025 Meenakshi Brooke, PT GP 2    17050359864 HC PT MANUAL  THERAPY EA 15 MIN 5/27/2025 Meenakshi Brooke, PT GP 1                      Meenakshi Brooke, PT  5/27/2025

## 2025-06-02 NOTE — PROGRESS NOTES
Subjective   Patient ID: Kenneth Jean Baptiste is a 69 y.o. male is here today for 9 month follow-up after injections    History of Present Illness    I been following this gentleman for the last several years.  He has known cervical stenosis and mainly chronic neck and thoracic back pain.  He tells me his primary care physician told him he also has fibromyalgia.  He was seen by her nurse practitioner a few months ago and because of some left facial pain was sent to neurology who he is still seeing.  The facial pain apparently got better.  He still has mainly chronic thoracic and neck pain.  Epidural blocks which have been helpful in the past at our pain clinic here really are not helping much anymore.  He still does not have arm symptoms and no motor deficit.  Nothing surgical.  His pain comes from the arthritic facet joints in both the cervical and thoracic spine.  I think it is time to shift gears and move towards the ablation approach.  He gets tramadol and gabapentin from his primary care physician.  Will send him to [facility] for ablations and also for medical management.  I will continue to follow him and see him in 9 months with x-rays.    The following portions of the patient's history were reviewed and updated as appropriate: allergies, current medications, past family history, past medical history, past social history, past surgical history, and problem list.    Review of Systems   All other systems reviewed and are negative.      Objective   Physical Exam  Constitutional:       General: He is awake.      Appearance: He is well-developed.   HENT:      Head: Normocephalic and atraumatic.   Eyes:      General: Lids are normal.      Extraocular Movements: Extraocular movements intact.      Conjunctiva/sclera: Conjunctivae normal.      Pupils: Pupils are equal, round, and reactive to light.   Neck:      Vascular: No carotid bruit.   Neurological:      Mental Status: He is alert.      Coordination: Coordination  is intact.      Deep Tendon Reflexes:      Reflex Scores:       Tricep reflexes are 2+ on the right side and 2+ on the left side.       Bicep reflexes are 2+ on the right side and 2+ on the left side.       Brachioradialis reflexes are 2+ on the right side and 2+ on the left side.       Patellar reflexes are 2+ on the right side and 2+ on the left side.       Achilles reflexes are 2+ on the right side and 2+ on the left side.  Psychiatric:         Speech: Speech normal.       Neurological Exam  Mental Status  Awake and alert. Oriented only to person, place, time and situation. Recent and remote memory are intact. Speech is normal. Language is fluent with no aphasia. Attention and concentration are normal. Fund of knowledge is appropriate for level of education.    Cranial Nerves  CN II: Visual acuity is normal. Visual fields full to confrontation.  CN III, IV, VI: Extraocular movements intact bilaterally. Normal lids and orbits bilaterally. Pupils equal round and reactive to light bilaterally.  CN V: Facial sensation is normal.  CN VII: Full and symmetric facial movement.  CN IX, X: Palate elevates symmetrically. Normal gag reflex.  CN XI: Shoulder shrug strength is normal.  CN XII: Tongue midline without atrophy or fasciculations.    Motor  Normal muscle bulk throughout. Normal muscle tone.                                               Right                     Left  Rhomboids                            5                          5  Infraspinatus                          5                          5  Supraspinatus                       5                          5  Deltoid                                   5                          5   Biceps                                   5                          5  Brachioradialis                      5                          5   Triceps                                  5                          5   Pronator                                5                          5    Supinator                              5                           5   Wrist flexor                            5                          5   Wrist extensor                       5                          5   Finger flexor                          5                          5   Finger extensor                     5                          5   Interossei                              5                          5   Abductor pollicis brevis         5                          5   Flexor pollicis brevis             5                          5   Opponens pollicis                 5                          5  Extensor digitorum               5                          5  Abductor digiti minimi           5                          5   Abdominal                            5                          5  Glutei                                    5                          5  Hip abductor                         5                          5  Hip adductor                         5                          5   Iliopsoas                               5                          5   Quadriceps                           5                          5   Hamstring                             5                          5   Gastrocnemius                     5                           5   Anterior tibialis                      5                          5   Posterior tibialis                    5                          5   Peroneal                               5                          5  Ankle dorsiflexor                   5                          5  Ankle plantar flexor              5                           5  Extensor hallucis longus      5                           5    Sensory  Light touch is normal in upper and lower extremities. Proprioception is normal in upper and lower extremities.     Reflexes                                            Right                      Left  Brachioradialis                    2+                          2+  Biceps                                 2+                         2+  Triceps                                2+                         2+  Finger flex                           2+                         2+  Hamstring                            2+                         2+  Patellar                                2+                         2+  Achilles                                2+                         2+    Coordination    Finger-to-nose, rapid alternating movements and heel-to-shin normal bilaterally without dysmetria.    Gait  Casual gait is normal including stance, stride, and arm swing.Normal toe walking. Normal heel walking. Normal tandem gait.       Assessment & Plan   Independent Review of Radiographic Studies:      I reviewed the cervical MRI done on spring 2024 which did not show a whole lot of progression since earlier studies.  He does have cervical stenosis with some foraminal entrapment at C3-C4, C4-C5 on the right and C5-C6 bilaterally.  But no severe cord compression.  Agree with the report.     Medical Decision Making:      Again, in the absence of cervical radiculopathy, nothing surgical.  Will refer him up to [facility] with Dr. Jeffrey for the possible cervical and thoracic radiofrequency ablation and medical management.  He will see us in 9 months with x-rays.  If he develops radiating arm pain, he will let us know.      Diagnoses and all orders for this visit:    1. Chronic neck pain (Primary)  -     Ambulatory Referral to Pain Management  -     XR spine cervical ap and lat w flex and ext; Future  -     XR Spine Thoracic 2 View; Future    2. Cervical spinal stenosis  -     Ambulatory Referral to Pain Management  -     XR spine cervical ap and lat w flex and ext; Future  -     XR Spine Thoracic 2 View; Future    3. Chronic bilateral thoracic back pain  -     Ambulatory Referral to Pain Management  -     XR spine cervical ap and lat w flex and ext; Future  -     XR Spine  Thoracic 2 View; Future    4. Fibromyalgia  -     Ambulatory Referral to Pain Management  -     XR spine cervical ap and lat w flex and ext; Future  -     XR Spine Thoracic 2 View; Future      Return in about 9 months (around 3/4/2026) for face to face.

## 2025-06-03 ENCOUNTER — APPOINTMENT (OUTPATIENT)
Dept: PHYSICAL THERAPY | Facility: HOSPITAL | Age: 70
End: 2025-06-03
Payer: MEDICARE

## 2025-06-04 ENCOUNTER — OFFICE VISIT (OUTPATIENT)
Dept: NEUROSURGERY | Facility: CLINIC | Age: 70
End: 2025-06-04
Payer: MEDICARE

## 2025-06-04 VITALS — DIASTOLIC BLOOD PRESSURE: 70 MMHG | OXYGEN SATURATION: 100 % | HEART RATE: 63 BPM | SYSTOLIC BLOOD PRESSURE: 138 MMHG

## 2025-06-04 DIAGNOSIS — M79.7 FIBROMYALGIA: ICD-10-CM

## 2025-06-04 DIAGNOSIS — M48.02 CERVICAL SPINAL STENOSIS: ICD-10-CM

## 2025-06-04 DIAGNOSIS — G89.29 CHRONIC BILATERAL THORACIC BACK PAIN: ICD-10-CM

## 2025-06-04 DIAGNOSIS — G89.29 CHRONIC NECK PAIN: Primary | ICD-10-CM

## 2025-06-04 DIAGNOSIS — M54.6 CHRONIC BILATERAL THORACIC BACK PAIN: ICD-10-CM

## 2025-06-04 DIAGNOSIS — M54.2 CHRONIC NECK PAIN: Primary | ICD-10-CM

## 2025-06-04 PROCEDURE — 1159F MED LIST DOCD IN RCRD: CPT | Performed by: NEUROLOGICAL SURGERY

## 2025-06-04 PROCEDURE — 1160F RVW MEDS BY RX/DR IN RCRD: CPT | Performed by: NEUROLOGICAL SURGERY

## 2025-06-04 PROCEDURE — 99213 OFFICE O/P EST LOW 20 MIN: CPT | Performed by: NEUROLOGICAL SURGERY

## 2025-06-11 ENCOUNTER — HOSPITAL ENCOUNTER (OUTPATIENT)
Dept: PHYSICAL THERAPY | Facility: HOSPITAL | Age: 70
Setting detail: THERAPIES SERIES
Discharge: HOME | End: 2025-06-11
Payer: MEDICARE

## 2025-06-11 DIAGNOSIS — R29.3 POOR POSTURE: ICD-10-CM

## 2025-06-11 DIAGNOSIS — M54.2 CHRONIC NECK PAIN: Primary | ICD-10-CM

## 2025-06-11 DIAGNOSIS — G89.29 CHRONIC NECK PAIN: Primary | ICD-10-CM

## 2025-06-11 PROCEDURE — 97140 MANUAL THERAPY 1/> REGIONS: CPT

## 2025-06-11 PROCEDURE — 97110 THERAPEUTIC EXERCISES: CPT

## 2025-06-11 NOTE — THERAPY TREATMENT NOTE
Outpatient Physical Therapy Ortho Treatment Note  [facility]     Patient Name: Kenneth Jean Baptiste  : 1955  MRN: [id number]  Today's Date: 2025      Visit Date: 2025    Visit Dx:    ICD-10-CM ICD-9-CM   1. Chronic neck pain  M54.2 723.1    G89.29 338.29   2. Poor posture  R29.3 781.92       Patient Active Problem List   Diagnosis    Degeneration of intervertebral disc of mid-cervical region    Left facial numbness    Migraine without aura and without status migrainosus, not intractable    Graves' disease    Postoperative hypothyroidism    Heart palpitations    Chest pain    Thrombocytopenia    Bilateral leg weakness    Cervical disc disorder at C5-C6 level with radiculopathy    Chronic bilateral thoracic back pain    DDD (degenerative disc disease), lumbar    Chronic neck pain    Neck muscle spasm    Scapular dysfunction    Abnormal MRI, shoulder    Cervical spinal stenosis    Cervical disc disorder at C6-C7 level with radiculopathy        Past Medical History:   Diagnosis Date    Arthritis     OSTEO    Avascular necrosis     HISTORY OF BOTH LEFT AND RIGHT HIPS    Bilateral inguinal hernia     Chronic neck pain     Colon polyps     HISTORY OF    Diverticulitis     Diverticulosis     GERD (gastroesophageal reflux disease)     Gout     Hypertension     MED SHORT PERIOD OF TIME. NOTHING RECENTLY    IBS (irritable bowel syndrome)     WITH CONSTIPATION    Insomnia     Loss of sensation     BOWEL.    Palpitation     DURING THE NIGHT TIMES    Peripheral neuropathy     GUSTAVO BALLS OF FEET    Personal history of COVID-19     2021. RESULT FROM [facility]    PONV (postoperative nausea and vomiting)     Thyroid disease     GOITER REMOVED-VASCULAR MASS. BENIGN,PARTIAL THYROIDECTOMY    Umbilical hernia         Past Surgical History:   Procedure Laterality Date    APPENDECTOMY  2013    Dr. Granado    BICEPS TENDON REPAIR Left 2012    COLONOSCOPY N/A 2021    EPIDURAL BLOCK      HAND SURGERY  Left     FRACTURED HAND    HIP SURGERY Left 1982    NERVE SURGERY,     HIP SURGERY      LEFT AND RIGHT BONFIGLIO BONE GRAFT 11/82 AND 1/83    INGUINAL HERNIA REPAIR Right 2000    Dr. 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 SINCE REMOVED    PARTIAL HIP ARTHROPLASTY Left 1984    THYROIDECTOMY, PARTIAL  06/08/2016    TOTAL HIP ARTHROPLASTY REVISION Left     SWITCHED TO FULL TOTAL                        PT Assessment/Plan       Row Name 06/11/25 7282          PT Assessment    Assessment Comments Mr. bardales returns for his 4th visit for neck and shoulder pain.  Pt reports after last visit thepain was not as bad as after the 2nd visit, but still more than he starts with.  Today we did not do any cerv or thoracic mobilizations, and only STM to Bilateral upper traps, and trigger point at L medial border of scap, followed by self trigger point pressure using a theracane.  Pt also performed chin tucks, which he does with no pain increase, and seems to have minimal improvment of cerv. rotation.  -LP     Please refer to paper survey for additional self-reported information No  -LP     Rehab Potential Good  -LP     Patient/caregiver participated in establishment of treatment plan and goals Yes  -LP     Patient would benefit from skilled therapy intervention Yes  -LP        PT Plan    PT Frequency 2x/week  -LP     PT Plan Comments re-assess his response to treatment. pt is getting a consult for a n. ablation, but is unsur if he wants to do this.  -LP               User Key  (r) = Recorded By, (t) = Taken By, (c) = Cosigned By      Initials Name Provider Type    LP Jeannie Wynn, PT Physical Therapist                       OP Exercises        Row Name 06/11/25 1700 06/11/25 1400          Subjective    Subjective Comments -- Not as sore after last visit, but still more pain after I leave than when I come.  -LP        Subjective Pain    Able to rate subjective pain? -- yes  -LP     Pre-Treatment Pain Level -- 2  -LP     Post-Treatment Pain Level -- 3  -LP        Total Minutes    68102 - PT Therapeutic Exercise Minutes -- 15  -LP     21575 - PT Manual Therapy Minutes 25  -LP --        Exercise 1    Exercise Name 1 -- UBE  -LP     Time 1 -- 4 mins  -LP        Exercise 2    Exercise Name 2 -- cerv AROM  -LP     Cueing 2 -- Verbal;Tactile;Demo  -LP     Additional Comments -- 75% rrange  -LP        Exercise 4    Exercise Name 4 -- supine chin tucks  -LP     Cueing 4 -- Verbal;Demo  -LP     Sets 4 -- 2  -LP     Reps 4 -- 10  -LP     Time 4 -- 5s  -LP        Exercise 6    Exercise Name 6 -- SL thoracic rotation  -LP     Cueing 6 -- Verbal;Tactile  -LP     Sets 6 -- 1  -LP     Reps 6 -- 5 ea side  -LP     Time 6 -- 5-10s  -LP     Additional Comments -- trying to follow with head  he still does not like this stretch, althought he does correctly.  -LP        Exercise 7    Exercise Name 7 -- cat/cow  -LP     Cueing 7 -- Verbal;Demo  -LP     Time 7 -- 10s  -LP        Exercise 8    Exercise Name 8 -- foam roll at end of session  -LP     Time 8 -- 3-4 minutes  -LP               User Key  (r) = Recorded By, (t) = Taken By, (c) = Cosigned By      Initials Name Provider Type    LP Jeannie Wynn, PT Physical Therapist                             Manual Rx (Last 36 Hours)       Manual Treatments       Row Name 06/11/25 1700             Total Minutes    87122 - PT Manual Therapy Minutes 25  -LP         Manual Rx 1    Manual Rx 1 Location seated  -LP      Manual Rx 1 Type STM to B UT's, rhomboids, lats, focusing on medial border of L scap for pt tenderness  -LP      Manual Rx 1 Grade moderte pressure  -LP      Manual Rx 1 Duration 20 min  -LP         Manual Rx 2     Manual Rx 2 Location seated  -LP      Manual Rx 2 Type self trigger point pressure with theracane  pt has this at home and likes using it  -LP      Manual Rx 2 Duration 5 min  -LP                User Key  (r) = Recorded By, (t) = Taken By, (c) = Cosigned By      Initials Name Provider Type    Jeannie Garcia, PT Physical Therapist                     PT OP Goals       Row Name 06/11/25 1700          PT Short Term Goals    STG Date to Achieve 06/13/25  -LP     STG 1 Pt will be independent with initial HEP and postural awareness to improve pain and symptoms.  -LP     STG 1 Progress Ongoing  -LP     STG 1 Progress Comments pt has not tolerated HEP so far, in that he has significant increase in pain when he performs several of the exercises  -LP     STG 2 Pt to be educated in/verbalize understanding of the importance of posture/ergonomics in association with their condition to facilitate self management of their condition.  -LP     STG 2 Progress Ongoing;Progressing  -LP     STG 3 Pt will improve cervical L rotation AROM from 38 degrees to 45 degrees to improve ability to drive safely.  -LP     STG 3 Progress Ongoing;Progressing  -LP     STG 3 Progress Comments Cerv. rotation does appear minimally improved butp ain still present  -LP     STG 4 Patient will report 50% reduction in mid thoracic pain leading to greater ease with ADLs, functional tasks and yard work.  -LP     STG 4 Progress Ongoing  -LP     STG 4 Progress Comments no change, and reports worse pain after PT, last 2 visits  -LP        Long Term Goals    LTG Date to Achieve 07/13/25  -LP     LTG 1 Pt will be independent with advance HEP and postural awareness for continued management of pain and symptoms.  -LP     LTG 1 Progress Ongoing  -LP     LTG 2 Pt will improve cervical L rotation AROM from 38 degrees to 50 degrees to improve ability to drive safely.  -LP     LTG 2 Progress Ongoing  -LP     LTG 3 Patient will report 75% reduction in mid thoracic  pain leading to greater ease with ADLs, functional tasks and yard work.  -LP     LTG 3 Progress Ongoing  -LP     LTG 4 Patient will report reduction in headache frequency from 1x/wk to </= 0 headaches per week over the last four weekend to indicate reduction in suboccipital muscle tension and improved posture.  -LP     LTG 4 Progress Ongoing  -LP     LTG 5 Patient will report pain at 7/10 at worst compared to 9/10 at worst at initial evaluation to indicate reduction in pain intensity and improved activity tolerance.  -LP     LTG 5 Progress Ongoing  -LP               User Key  (r) = Recorded By, (t) = Taken By, (c) = Cosigned By      Initials Name Provider Type    LP Jeannie Wynn, PT Physical Therapist                    Therapy Education  Given: Symptoms/condition management, Pain management  Program: New, Reinforced  How Provided: Verbal  Provided to: Patient  Level of Understanding: Teach back education performed              Time Calculation:   Start Time: 1445  Stop Time: 1530  Time Calculation (min): 45 min  Timed Charges  70532 - PT Therapeutic Exercise Minutes: 15  09893 - PT Manual Therapy Minutes: 25  Total Minutes  Timed Charges Total Minutes: 25   Total Minutes: 25  Therapy Charges for Today       Code Description Service Date Service Provider Modifiers Qty    14516262016  PT MANUAL THERAPY EA 15 MIN 6/11/2025 Jeannie Wynn, PT GP 2    25015927237 HC PT THER PROC EA 15 MIN 6/11/2025 Jeannie Wynn, PT GP 1                      Jeannie Wynn PT  6/11/2025

## 2025-06-13 ENCOUNTER — HOSPITAL ENCOUNTER (OUTPATIENT)
Dept: PHYSICAL THERAPY | Facility: HOSPITAL | Age: 70
Setting detail: THERAPIES SERIES
Discharge: HOME | End: 2025-06-13
Payer: MEDICARE

## 2025-06-13 DIAGNOSIS — R29.3 POOR POSTURE: ICD-10-CM

## 2025-06-13 DIAGNOSIS — G89.29 CHRONIC NECK PAIN: Primary | ICD-10-CM

## 2025-06-13 DIAGNOSIS — M54.2 CHRONIC NECK PAIN: Primary | ICD-10-CM

## 2025-06-13 PROCEDURE — 97140 MANUAL THERAPY 1/> REGIONS: CPT

## 2025-06-13 PROCEDURE — 97110 THERAPEUTIC EXERCISES: CPT

## 2025-06-13 NOTE — THERAPY PROGRESS REPORT/RE-CERT
Outpatient Physical Therapy Ortho Progress Note  [facility]     Patient Name: Kenneth Jean Baptiste  : 1955  MRN: [id number]  Today's Date: 2025      Visit Date: 2025    Visit Dx:    ICD-10-CM ICD-9-CM   1. Chronic neck pain  M54.2 723.1    G89.29 338.29   2. Poor posture  R29.3 781.92       Patient Active Problem List   Diagnosis    Degeneration of intervertebral disc of mid-cervical region    Left facial numbness    Migraine without aura and without status migrainosus, not intractable    Graves' disease    Postoperative hypothyroidism    Heart palpitations    Chest pain    Thrombocytopenia    Bilateral leg weakness    Cervical disc disorder at C5-C6 level with radiculopathy    Chronic bilateral thoracic back pain    DDD (degenerative disc disease), lumbar    Chronic neck pain    Neck muscle spasm    Scapular dysfunction    Abnormal MRI, shoulder    Cervical spinal stenosis    Cervical disc disorder at C6-C7 level with radiculopathy        Past Medical History:   Diagnosis Date    Arthritis     OSTEO    Avascular necrosis     HISTORY OF BOTH LEFT AND RIGHT HIPS    Bilateral inguinal hernia     Chronic neck pain     Colon polyps     HISTORY OF    Diverticulitis     Diverticulosis     GERD (gastroesophageal reflux disease)     Gout     Hypertension     MED SHORT PERIOD OF TIME. NOTHING RECENTLY    IBS (irritable bowel syndrome)     WITH CONSTIPATION    Insomnia     Loss of sensation     BOWEL.    Palpitation     DURING THE NIGHT TIMES    Peripheral neuropathy     GUSTAVO BALLS OF FEET    Personal history of COVID-19     2021. RESULT FROM [facility] [place]    PONV (postoperative nausea and vomiting)     Thyroid disease     GOITER REMOVED-VASCULAR MASS. BENIGN,PARTIAL THYROIDECTOMY    Umbilical hernia         Past Surgical History:   Procedure Laterality Date    APPENDECTOMY  2013    Dr. Granado    BICEPS TENDON REPAIR Left 2012    COLONOSCOPY N/A 2021    EPIDURAL BLOCK      HAND SURGERY  Left     FRACTURED HAND    HIP SURGERY Left 1982    NERVE SURGERY,     HIP SURGERY      LEFT AND RIGHT BONFIGLIO BONE GRAFT 11/82 AND 1/83    INGUINAL HERNIA REPAIR Right 2000    Dr. Mckenna    INGUINAL HERNIA REPAIR Bilateral 12/1/2021    Procedure: BILATERAL INGUINAL HERNIA REPAIR LAPAROSCOPIC WITH [employer] ROBOT UMBILICAL HERNIA REPAIR;  Surgeon: Zbigniew Starr MD;  Location: [facility];  Service: Robotics - [employer];  Laterality: Bilateral;    ORIF ULNA/RADIUS FRACTURES Right     WITH HARDWARE. SINCE REMOVED    PARTIAL HIP ARTHROPLASTY Left 1984    THYROIDECTOMY, PARTIAL  06/08/2016    TOTAL HIP ARTHROPLASTY REVISION Left     SWITCHED TO FULL TOTAL        PT Ortho       Row Name 06/13/25 1500       Cervical/Shoulder ROM Screen    Cervical rotation Impaired  R 60 deg, L 45 deg  -JS              User Key  (r) = Recorded By, (t) = Taken By, (c) = Cosigned By      Initials Name Provider Type    Julee Garcia, PT Physical Therapist                                 PT Assessment/Plan       Row Name 06/13/25 1500          PT Assessment    Functional Limitations Limitation in home management;Limitations in community activities;Limitations in functional capacity and performance;Performance in leisure activities;Performance in self-care ADL  -JS     Impairments Balance;Impaired flexibility;Impaired muscle length;Impaired muscle power;Joint mobility;Pain;Muscle strength;Poor body mechanics;Range of motion;Posture;Peripheral nerve integrity  -JS     Assessment Comments Kenneth Jean Baptiste has been seen for 5 physical therapy sessions (including initial evaluation and today's progress note) for L sided neck/shoulder blade pain and mid thoracic pain that is chronic in nature. Patient reports minimal to no improvement in symptoms since the start of PT. He feels his cervical rotation ROM has slightly increased but no changes in his mid back pain. Limited sessions have been completed secondary to clinic  availability and pt wife undergoing complex abdominal surgery.  Treatment has included therapeutic exercise, manual therapy, and patient education with home exercise program . Progress to physical therapy goals is good. Pt has met 2/4 STG and 0/5 LTG and slowly progressing towards remaining goals. Pt continues to demonstrate significant cervical/thoracic facet hypomobility and associated decrease ROM. He benefits from increased time spent on thoracic P/As leading to reduction tension and pain at end of session. Will transition focus to manual therapy interventions to better address facet mobility deficits prior to focusing on dorsal scapular/postural strengthening. He will benefit from continued skilled physical therapy to address remaining impairments and functional limitations.  -JS     Please refer to paper survey for additional self-reported information No  -JS     Rehab Potential Good  -JS     Patient/caregiver participated in establishment of treatment plan and goals Yes  -JS     Patient would benefit from skilled therapy intervention Yes  -JS        PT Plan    PT Frequency 2x/week  -JS     Predicted Duration of Therapy Intervention (PT) 10-12 visits  -JS     Planned CPT's? PT RE-EVAL: 81635;PT THER PROC EA 15 MIN: 59267;PT THER ACT EA 15 MIN: 59523;PT MANUAL THERAPY EA 15 MIN: 98532;PT NEUROMUSC RE-EDUCATION EA 15 MIN: 72274;PT SELF CARE/HOME MGMT/TRAIN EA 15: 99403;PT HOT OR COLD PACK TREAT MCARE;PT ELECTRICAL STIM UNATTEND:   -JS     PT Plan Comments continue with manual therapy, consider cervical PIVM, inc time spent on thoracic mobility and STM  -JS               User Key  (r) = Recorded By, (t) = Taken By, (c) = Cosigned By      Initials Name Provider Type    Julee Garcia, PT Physical Therapist                       OP Exercises       Row Name 06/13/25 1400             Subjective    Subjective Comments I dont feel like I am that much better  -JS         Subjective Pain    Able to rate  subjective pain? yes  -JS      Pre-Treatment Pain Level 7  -JS      Post-Treatment Pain Level 3  -JS         Total Minutes    66008 - PT Therapeutic Exercise Minutes 20  -JS      35760 - PT Manual Therapy Minutes 25  -JS         Exercise 1    Exercise Name 1 UBE  -JS      Time 1 4 mins  -JS         Exercise 3    Exercise Name 3 door stretch  -JS      Cueing 3 Verbal;Demo  -JS      Sets 3 1  -JS      Reps 3 4  -JS      Time 3 20sec  -JS         Exercise 4    Exercise Name 4 supine chin tucks  -JS      Cueing 4 Verbal;Demo  -JS      Sets 4 2  -JS      Reps 4 10  -JS      Time 4 5s  -JS         Exercise 7    Exercise Name 7 cat/cow  -JS      Cueing 7 Verbal;Demo  -JS      Time 7 10s  -JS         Exercise 9    Exercise Name 9 Time spent assessing goals, taking cervical ROM and discussing POC  -JS                User Key  (r) = Recorded By, (t) = Taken By, (c) = Cosigned By      Initials Name Provider Type    Julee Garcia, PT Physical Therapist                             Manual Rx (Last 36 Hours)       Manual Treatments       Row Name 06/13/25 1400             Total Minutes    04001 - PT Manual Therapy Minutes 25  -JS         Manual Rx 1    Manual Rx 1 Location Thoracic PAs, grd 3-4, STM to rhomboids  -JS      Manual Rx 1 Grade 1-2  -JS      Manual Rx 1 Duration 25 mins  -JS                User Key  (r) = Recorded By, (t) = Taken By, (c) = Cosigned By      Initials Name Provider Type    Julee Garcia, PT Physical Therapist                     PT OP Goals       Row Name 06/13/25 1400          PT Short Term Goals    STG Date to Achieve 06/13/25  -     STG 1 Pt will be independent with initial HEP and postural awareness to improve pain and symptoms.  -JS     STG 1 Progress Met  -     STG 2 Pt to be educated in/verbalize understanding of the importance of posture/ergonomics in association with their condition to facilitate self management of their condition.  -     STG 2 Progress Ongoing;Progressing   -JS     STG 3 Pt will improve cervical L rotation AROM from 38 degrees to 45 degrees to improve ability to drive safely.  -JS     STG 3 Progress Met  -JS     STG 3 Progress Comments 45 deg  -JS     STG 4 Patient will report 50% reduction in mid thoracic pain leading to greater ease with ADLs, functional tasks and yard work.  -JS     STG 4 Progress Ongoing  -JS     STG 4 Progress Comments no change  -JS        Long Term Goals    LTG Date to Achieve 07/13/25  -JS     LTG 1 Pt will be independent with advance HEP and postural awareness for continued management of pain and symptoms.  -JS     LTG 1 Progress Ongoing  -JS     LTG 2 Pt will improve cervical L rotation AROM from 38 degrees to 50 degrees to improve ability to drive safely.  -JS     LTG 2 Progress Ongoing  -JS     LTG 3 Patient will report 75% reduction in mid thoracic pain leading to greater ease with ADLs, functional tasks and yard work.  -JS     LTG 3 Progress Ongoing  -JS     LTG 3 Progress Comments no change  -JS     LTG 4 Patient will report reduction in headache frequency from 1x/wk to </= 0 headaches per week over the last four weekend to indicate reduction in suboccipital muscle tension and improved posture.  -JS     LTG 4 Progress Ongoing  -JS     LTG 5 Patient will report pain at 7/10 at worst compared to 9/10 at worst at initial evaluation to indicate reduction in pain intensity and improved activity tolerance.  -JS     LTG 5 Progress Ongoing;Progressing  -JS     LTG 5 Progress Comments 8/10  -JS               User Key  (r) = Recorded By, (t) = Taken By, (c) = Cosigned By      Initials Name Provider Type    Julee Garcia, PT Physical Therapist                    Therapy Education  Given: HEP, Symptoms/condition management, Pain management, Posture/body mechanics  Program: Reinforced  How Provided: Verbal, Demonstration  Provided to: Patient  Level of Understanding: Teach back education performed, Verbalized, Demonstrated              Time  Calculation:   Start Time: 1445  Stop Time: 1530  Time Calculation (min): 45 min  Timed Charges  61906 - PT Therapeutic Exercise Minutes: 20  70738 - PT Manual Therapy Minutes: 25  Total Minutes  Timed Charges Total Minutes: 45   Total Minutes: 45  Therapy Charges for Today       Code Description Service Date Service Provider Modifiers Qty    [id number]  PT THER PROC EA 15 MIN 6/13/2025 Julee Ramírez, PT GP 1    [id number]  PT MANUAL THERAPY EA 15 MIN 6/13/2025 Julee Ramírez, PT GP 2                      Julee Ramírez, PT  6/13/2025

## 2025-06-17 ENCOUNTER — HOSPITAL ENCOUNTER (OUTPATIENT)
Dept: PHYSICAL THERAPY | Facility: HOSPITAL | Age: 70
Setting detail: THERAPIES SERIES
Discharge: HOME | End: 2025-06-17
Payer: MEDICARE

## 2025-06-17 DIAGNOSIS — M54.2 CHRONIC NECK PAIN: Primary | ICD-10-CM

## 2025-06-17 DIAGNOSIS — R29.3 POOR POSTURE: ICD-10-CM

## 2025-06-17 DIAGNOSIS — G89.29 CHRONIC NECK PAIN: Primary | ICD-10-CM

## 2025-06-17 PROCEDURE — 97140 MANUAL THERAPY 1/> REGIONS: CPT

## 2025-06-17 PROCEDURE — 97110 THERAPEUTIC EXERCISES: CPT

## 2025-06-17 NOTE — THERAPY TREATMENT NOTE
Outpatient Physical Therapy Ortho Treatment Note  [facility]     Patient Name: Kenneth Jean Baptiste  : 1955  MRN: [id number]  Today's Date: 2025      Visit Date: 2025    Visit Dx:    ICD-10-CM ICD-9-CM   1. Chronic neck pain  M54.2 723.1    G89.29 338.29   2. Poor posture  R29.3 781.92       Patient Active Problem List   Diagnosis    Degeneration of intervertebral disc of mid-cervical region    Left facial numbness    Migraine without aura and without status migrainosus, not intractable    Graves' disease    Postoperative hypothyroidism    Heart palpitations    Chest pain    Thrombocytopenia    Bilateral leg weakness    Cervical disc disorder at C5-C6 level with radiculopathy    Chronic bilateral thoracic back pain    DDD (degenerative disc disease), lumbar    Chronic neck pain    Neck muscle spasm    Scapular dysfunction    Abnormal MRI, shoulder    Cervical spinal stenosis    Cervical disc disorder at C6-C7 level with radiculopathy        Past Medical History:   Diagnosis Date    Arthritis     OSTEO    Avascular necrosis     HISTORY OF BOTH LEFT AND RIGHT HIPS    Bilateral inguinal hernia     Chronic neck pain     Colon polyps     HISTORY OF    Diverticulitis     Diverticulosis     GERD (gastroesophageal reflux disease)     Gout     Hypertension     MED SHORT PERIOD OF TIME. NOTHING RECENTLY    IBS (irritable bowel syndrome)     WITH CONSTIPATION    Insomnia     Loss of sensation     BOWEL.    Palpitation     DURING THE NIGHT TIMES    Peripheral neuropathy     GUSTAVO BALLS OF FEET    Personal history of COVID-19     2021. [facility]ULT [facility] [place]    PONV (postoperative nausea and vomiting)     Thyroid disease     GOITER REMOVED-VASCULAR MASS. BENIGN,PARTIAL THYROIDECTOMY    Umbilical hernia         Past Surgical History:   Procedure Laterality Date    APPENDECTOMY  2013    Dr. Granado    BICEPS TENDON REPAIR Left 2012    COLONOSCOPY N/A 2021    EPIDURAL BLOCK      HAND SURGERY  Left     FRACTURED HAND    HIP SURGERY Left 1982    NERVE SURGERY,     HIP SURGERY      LEFT AND RIGHT BONFIGLIO BONE GRAFT 11/82 AND 1/83    INGUINAL HERNIA REPAIR Right 2000    Dr. Mckenna    INGUINAL HERNIA REPAIR Bilateral 12/1/2021    Procedure: BILATERAL INGUINAL HERNIA REPAIR LAPAROSCOPIC WITH [employer] ROBOT UMBILICAL HERNIA REPAIR;  Surgeon: Zbigniew Starr MD;  Location: [facility];  Service: Robotics - [employer];  Laterality: Bilateral;    ORIF ULNA/RADIUS FRACTURES Right     WITH HARDWARE. SINCE REMOVED    PARTIAL HIP ARTHROPLASTY Left 1984    THYROIDECTOMY, PARTIAL  06/08/2016    TOTAL HIP ARTHROPLASTY REVISION Left     SWITCHED TO FULL TOTAL                        PT Assessment/Plan       Row Name 06/17/25 1600          PT Assessment    Assessment Comments Mr. Jean Baptiste returns to PT with reports of significant reduction in mid thoracic/R scapular pain following increased time on thoracic P/As last session. Therefore, repeated manual therapy with addition of STM to thoracic paraspinals and rhomboids (R side bias). He also tolerated addition of seated thoracic extension mobilization at wall and resumed SL thoracic rotation without cervical follow. Kenneth Jean Baptiste remains a candidate for skilled PT.  -JS        PT Plan    PT Plan Comments repeat manual therapy to thoracic spine. consider joint mobs to cervical spine. consider thread the needle, update HEP  -JS               User Key  (r) = Recorded By, (t) = Taken By, (c) = Cosigned By      Initials Name Provider Type    Julee Garcia, PT Physical Therapist                       OP Exercises       Row Name 06/17/25 1500             Subjective    Subjective Comments My back is feeling so much better after last time. I really feel like that helped. My neck pain is unchanged  -JS         Subjective Pain    Able to rate subjective pain? yes  -JS      Pre-Treatment Pain Level 2  -JS      Post-Treatment Pain Level 2  -JS         Total Minutes     72822 - PT Therapeutic Exercise Minutes 20  -JS      88580 - PT Manual Therapy Minutes 25  -JS         Exercise 1    Exercise Name 1 UBE  -JS      Time 1 4 mins  -JS         Exercise 3    Exercise Name 3 door stretch  -JS      Cueing 3 Verbal;Demo  -JS      Sets 3 1  -JS      Reps 3 4  -JS      Time 3 20sec  -JS         Exercise 4    Exercise Name 4 supine chin tucks  -JS      Cueing 4 Verbal;Demo  -JS      Sets 4 2  -JS      Reps 4 10  -JS      Time 4 5s  -JS         Exercise 5    Exercise Name 5 seated elbow slides at wall  -JS      Cueing 5 Verbal;Demo  -JS      Sets 5 1  -JS      Reps 5 15  -JS      Additional Comments hands behind head, facing wall and sliding elbows up/down to promote thoracic extension  -JS         Exercise 6    Exercise Name 6 SL thoracic rotation  -JS      Cueing 6 Verbal;Tactile  -JS      Sets 6 1  -JS      Reps 6 15e  -JS      Time 6 5-10s  -JS      Additional Comments with comfortable cervical follow  -JS         Exercise 7    Exercise Name 7 cat/cow  -JS      Cueing 7 Verbal;Demo  -JS      Sets 7 2  -JS      Reps 7 10  -JS      Time 7 10s  -JS                User Key  (r) = Recorded By, (t) = Taken By, (c) = Cosigned By      Initials Name Provider Type    JS Julee Ramírez, PT Physical Therapist                             Manual Rx (Last 36 Hours)       Manual Treatments       Row Name 06/17/25 1500             Total Minutes    47467 - PT Manual Therapy Minutes 25  -JS         Manual Rx 1    Manual Rx 1 Location Thoracic PAs, grd 3-4, STM to rhomboids  -JS      Manual Rx 1 Grade 1-2  -JS         Manual Rx 3    Manual Rx 3 Location seated deep tissue massage to paraspinals and rhomboids  -JS                User Key  (r) = Recorded By, (t) = Taken By, (c) = Cosigned By      Initials Name Provider Type    JS Julee Ramírez, PT Physical Therapist                     PT OP Goals       Row Name 06/17/25 1600          PT Short Term Goals    STG Date to Achieve 06/13/25  -JS     STG 1  Pt will be independent with initial HEP and postural awareness to improve pain and symptoms.  -JS     STG 1 Progress Met  -JS     STG 2 Pt to be educated in/verbalize understanding of the importance of posture/ergonomics in association with their condition to facilitate self management of their condition.  -JS     STG 2 Progress Ongoing;Progressing  -JS     STG 3 Pt will improve cervical L rotation AROM from 38 degrees to 45 degrees to improve ability to drive safely.  -JS     STG 3 Progress Met  -JS     STG 4 Patient will report 50% reduction in mid thoracic pain leading to greater ease with ADLs, functional tasks and yard work.  -     STG 4 Progress Ongoing  -JS        Long Term Goals    LTG Date to Achieve 07/13/25  -JS     LTG 1 Pt will be independent with advance HEP and postural awareness for continued management of pain and symptoms.  -JS     LTG 1 Progress Ongoing  -JS     LTG 2 Pt will improve cervical L rotation AROM from 38 degrees to 50 degrees to improve ability to drive safely.  -     LTG 2 Progress Ongoing  -JS     LTG 3 Patient will report 75% reduction in mid thoracic pain leading to greater ease with ADLs, functional tasks and yard work.  -     LTG 3 Progress Ongoing  -JS     LTG 4 Patient will report reduction in headache frequency from 1x/wk to </= 0 headaches per week over the last four weekend to indicate reduction in suboccipital muscle tension and improved posture.  -     LTG 4 Progress Ongoing  -JS     LTG 5 Patient will report pain at 7/10 at worst compared to 9/10 at worst at initial evaluation to indicate reduction in pain intensity and improved activity tolerance.  -     LTG 5 Progress Ongoing;Progressing  -               User Key  (r) = Recorded By, (t) = Taken By, (c) = Cosigned By      Initials Name Provider Type    Julee Garcia, PT Physical Therapist                    Therapy Education  Given: HEP, Symptoms/condition management, Pain management, Posture/body  mechanics  Program: Reinforced  How Provided: Verbal, Demonstration  Provided to: Patient  Level of Understanding: Teach back education performed, Verbalized, Demonstrated              Time Calculation:   Start Time: 1530  Stop Time: 1615  Time Calculation (min): 45 min  Timed Charges  42095 - PT Therapeutic Exercise Minutes: 20  14374 - PT Manual Therapy Minutes: 25  Total Minutes  Timed Charges Total Minutes: 45   Total Minutes: 45  Therapy Charges for Today       Code Description Service Date Service Provider Modifiers Qty    53955409795  PT THER PROC EA 15 MIN 6/17/2025 Julee Ramírez, PT GP 1    [id number]  PT MANUAL THERAPY EA 15 MIN 6/17/2025 Julee Ramírez, PT GP 2                      Julee Ramírez PT  6/17/2025

## 2025-06-18 ENCOUNTER — TELEPHONE (OUTPATIENT)
Dept: PAIN MEDICINE | Facility: CLINIC | Age: 70
End: 2025-06-18
Payer: MEDICARE

## 2025-06-18 NOTE — TELEPHONE ENCOUNTER
SPOKE TO PATIENT TO CONFIRM APPOINTMENT TOMORROW 6/19/2025. REMINDER TO ARRIVE 15 MIN EARLY FOR PAPERWORK/ INSURANCE.

## 2025-06-19 ENCOUNTER — TRANSCRIBE ORDERS (OUTPATIENT)
Dept: SURGERY | Facility: SURGERY CENTER | Age: 70
End: 2025-06-19
Payer: MEDICARE

## 2025-06-19 ENCOUNTER — PREP FOR SURGERY (OUTPATIENT)
Dept: SURGERY | Facility: SURGERY CENTER | Age: 70
End: 2025-06-19
Payer: MEDICARE

## 2025-06-19 ENCOUNTER — OFFICE VISIT (OUTPATIENT)
Dept: PAIN MEDICINE | Facility: CLINIC | Age: 70
End: 2025-06-19
Payer: MEDICARE

## 2025-06-19 ENCOUNTER — HOSPITAL ENCOUNTER (OUTPATIENT)
Dept: PHYSICAL THERAPY | Facility: HOSPITAL | Age: 70
Setting detail: THERAPIES SERIES
Discharge: HOME | End: 2025-06-19
Payer: MEDICARE

## 2025-06-19 VITALS
HEIGHT: 68 IN | OXYGEN SATURATION: 100 % | HEART RATE: 56 BPM | RESPIRATION RATE: 18 BRPM | WEIGHT: 151 LBS | BODY MASS INDEX: 22.88 KG/M2 | SYSTOLIC BLOOD PRESSURE: 136 MMHG | TEMPERATURE: 97.4 F | DIASTOLIC BLOOD PRESSURE: 72 MMHG

## 2025-06-19 DIAGNOSIS — G89.29 CHRONIC NECK PAIN: Primary | ICD-10-CM

## 2025-06-19 DIAGNOSIS — Z41.9 SURGERY, ELECTIVE: Primary | ICD-10-CM

## 2025-06-19 DIAGNOSIS — M54.2 CHRONIC NECK PAIN: Primary | ICD-10-CM

## 2025-06-19 DIAGNOSIS — M47.812 CERVICAL SPONDYLOSIS WITHOUT MYELOPATHY: Primary | ICD-10-CM

## 2025-06-19 DIAGNOSIS — M54.2 CHRONIC NECK PAIN: ICD-10-CM

## 2025-06-19 DIAGNOSIS — G89.29 CHRONIC NECK PAIN: ICD-10-CM

## 2025-06-19 DIAGNOSIS — M50.30 DDD (DEGENERATIVE DISC DISEASE), CERVICAL: ICD-10-CM

## 2025-06-19 DIAGNOSIS — M50.320 DEGENERATION OF INTERVERTEBRAL DISC OF MID-CERVICAL REGION, UNSPECIFIED SPINAL LEVEL: ICD-10-CM

## 2025-06-19 DIAGNOSIS — R29.3 POOR POSTURE: ICD-10-CM

## 2025-06-19 PROCEDURE — 97110 THERAPEUTIC EXERCISES: CPT

## 2025-06-19 PROCEDURE — 97140 MANUAL THERAPY 1/> REGIONS: CPT

## 2025-06-19 RX ORDER — SODIUM CHLORIDE 0.9 % (FLUSH) 0.9 %
10 SYRINGE (ML) INJECTION EVERY 12 HOURS SCHEDULED
OUTPATIENT
Start: 2025-06-19

## 2025-06-19 RX ORDER — LEVOTHYROXINE SODIUM 88 MCG
1 TABLET ORAL
COMMUNITY
Start: 2025-05-14

## 2025-06-19 RX ORDER — DIAZEPAM 5 MG/1
10 TABLET ORAL ONCE
OUTPATIENT
Start: 2025-06-19

## 2025-06-19 NOTE — THERAPY TREATMENT NOTE
Outpatient Physical Therapy Ortho Treatment Note  [facility]     Patient Name: Kenneth Jean Baptiste  : 1955  MRN: [id number]  Today's Date: 2025      Visit Date: 2025    Visit Dx:    ICD-10-CM ICD-9-CM   1. Chronic neck pain  M54.2 723.1    G89.29 338.29   2. Poor posture  R29.3 781.92       Patient Active Problem List   Diagnosis    Degeneration of intervertebral disc of mid-cervical region    Left facial numbness    Migraine without aura and without status migrainosus, not intractable    Graves' disease    Postoperative hypothyroidism    Heart palpitations    Chest pain    Thrombocytopenia    Bilateral leg weakness    Cervical disc disorder at C5-C6 level with radiculopathy    Chronic bilateral thoracic back pain    DDD (degenerative disc disease), lumbar    Chronic neck pain    Neck muscle spasm    Scapular dysfunction    Abnormal MRI, shoulder    Cervical spinal stenosis    Cervical disc disorder at C6-C7 level with radiculopathy    Cervical spondylosis without myelopathy    DDD (degenerative disc disease), cervical        Past Medical History:   Diagnosis Date    Abnormal ECG     ALS (amyotrophic lateral sclerosis)     Anxiety     Heart aretyhmia, sleeplessness    Arthritis     OSTEO    Avascular necrosis     HISTORY OF BOTH LEFT AND RIGHT HIPS    Bilateral inguinal hernia     Cervical disc disorder     Chronic constipation     Chronic neck pain     Colon polyps     HISTORY OF    Diverticulitis     Diverticulosis     Fibromyalgia, primary     GERD (gastroesophageal reflux disease)     Gout     Headache, tension-type     HL (hearing loss)     Hypertension     MED SHORT PERIOD OF TIME. NOTHING RECENTLY    IBS (irritable bowel syndrome)     WITH CONSTIPATION    Insomnia     Loss of sensation     BOWEL.    Low back pain     Lumbosacral disc disease     Memory loss     Migraine     Palpitation     DURING THE NIGHT TIMES    Peripheral neuropathy     GUSTAVO BALLS OF FEET    Personal history  of COVID-19     SEPT 13 2021. RESULT FROM [facility] [place]    PONV (postoperative nausea and vomiting)     Thoracic disc disorder     Thyroid disease     GOITER REMOVED-VASCULAR MASS. BENIGN,PARTIAL THYROIDECTOMY    Umbilical hernia         Past Surgical History:   Procedure Laterality Date    APPENDECTOMY  03/2013    Dr. Granado    BICEPS TENDON REPAIR Left 08/28/2012    COLONOSCOPY N/A 02/2021    EPIDURAL BLOCK      HAND SURGERY Left     FRACTURED HAND    HIP SURGERY Left 1982    NERVE SURGERY,     HIP SURGERY      LEFT AND RIGHT BONFIGLIO BONE GRAFT 11/82 AND 1/83    INGUINAL HERNIA REPAIR Right 2000    Dr. Mckenna    INGUINAL HERNIA REPAIR Bilateral 12/1/2021    Procedure: BILATERAL INGUINAL HERNIA REPAIR LAPAROSCOPIC WITH [employer] ROBOT UMBILICAL HERNIA REPAIR;  Surgeon: Zbigniew Starr MD;  Location: [facility] OR;  Service: Robotics - [employer];  Laterality: Bilateral;    ORIF ULNA/RADIUS FRACTURES Right     WITH HARDWARE. SINCE REMOVED    PARTIAL HIP ARTHROPLASTY Left 1984    THYROIDECTOMY, PARTIAL  06/08/2016    TOTAL HIP ARTHROPLASTY REVISION Left     SWITCHED TO FULL TOTAL                        PT Assessment/Plan       Row Name 06/19/25 1100          PT Assessment    Assessment Comments Pt returns to PT reporting increased soreness and pain following previous session but pt states he believes it is more weather related. Pt continues w/ decreased thoracic mobility and initiated session w/ increased active mobility to open up his spine and improve rotation prior to initiating manual therapy. Updated pt's HEP to include progressed T-spine mobility for continued improvements in his ROM and tolerance to active mobility. Initiated C-spine UPAs to assist w/ mobility deficits as mobs to his T-spine were only tolerable w/ low grades. Pt feels his back is worse today more so than usual, due to elevated soreness pt's paraspinals were very stiff and guarded. Pt continues to be appropriate for skilled PT  intervention.  -CS        PT Plan    PT Plan Comments Return to more T-spine mobility manual pt was sore last session and spent more time w/ cervical, consider adding SAPs w/ ABCs, seated therapist-assisted T-spine rotation?  -CS               User Key  (r) = Recorded By, (t) = Taken By, (c) = Cosigned By      Initials Name Provider Type    CS Raj Corral, PT Physical Therapist                       OP Exercises       Row Name 06/19/25 1100             Subjective    Subjective Comments I'm pretty sore today, it's a little more than before  -CS         Subjective Pain    Able to rate subjective pain? yes  -CS      Pre-Treatment Pain Level 4  -CS      Post-Treatment Pain Level 4  -CS         Total Minutes    83979 - PT Therapeutic Exercise Minutes 20  -CS      13586 - PT Manual Therapy Minutes 23  -CS         Exercise 1    Exercise Name 1 UBE  -CS      Time 1 4 mins  -CS         Exercise 3    Exercise Name 3 door stretch  -CS      Cueing 3 Verbal;Demo  -CS      Sets 3 1  -CS      Reps 3 4  -CS      Time 3 20sec  -CS         Exercise 4    Exercise Name 4 supine chin tucks  -CS      Cueing 4 Verbal;Demo  -CS      Sets 4 2  -CS      Reps 4 10  -CS      Time 4 5s  -CS         Exercise 5    Exercise Name 5 seated elbow slides at wall  -CS      Cueing 5 Verbal;Demo  -CS      Sets 5 1  -CS      Reps 5 15  -CS      Additional Comments hands behind head, facing wall, elbows up/down wall  -CS         Exercise 6    Exercise Name 6 SL thoracic rotation  -CS      Cueing 6 Verbal;Tactile  -CS      Sets 6 1  -CS      Reps 6 15e  -CS      Time 6 5-10s  -CS         Exercise 7    Exercise Name 7 cat/cow  -CS      Cueing 7 Verbal;Demo  -CS      Sets 7 2  -CS      Reps 7 10  -CS      Time 7 10s  -CS         Exercise 9    Exercise Name 9 Thread the needle at wall  -CS      Cueing 9 Verbal;Demo  -CS      Sets 9 1  -CS      Reps 9 15ea  -CS      Time 9 3-5s  -CS                User Key  (r) = Recorded By, (t) = Taken By, (c) = Cosigned By       Initials Name Provider Type    CS Raj Corral, PT Physical Therapist                             Manual Rx (Last 36 Hours)       Manual Treatments       Row Name 06/19/25 1100             Total Minutes    98677 - PT Manual Therapy Minutes 23  -CS         Manual Rx 1    Manual Rx 1 Location Thoracic PAs, grd 3-4, STM to rhomboids  -CS      Manual Rx 1 Grade 1-2  -CS         Manual Rx 2    Manual Rx 2 Location C-spine PAs  -CS      Manual Rx 2 Type Pt supine and prone  -CS      Manual Rx 2 Grade I-III as tolerated  -CS                User Key  (r) = Recorded By, (t) = Taken By, (c) = Cosigned By      Initials Name Provider Type    CS Raj Corral, PT Physical Therapist                     PT OP Goals       Row Name 06/19/25 1100          PT Short Term Goals    STG Date to Achieve 06/13/25  -CS     STG 1 Pt will be independent with initial HEP and postural awareness to improve pain and symptoms.  -CS     STG 1 Progress Met  -CS     STG 2 Pt to be educated in/verbalize understanding of the importance of posture/ergonomics in association with their condition to facilitate self management of their condition.  -CS     STG 2 Progress Ongoing;Progressing  -CS     STG 3 Pt will improve cervical L rotation AROM from 38 degrees to 45 degrees to improve ability to drive safely.  -CS     STG 3 Progress Met  -CS     STG 4 Patient will report 50% reduction in mid thoracic pain leading to greater ease with ADLs, functional tasks and yard work.  -CS     STG 4 Progress Ongoing  -CS        Long Term Goals    LTG Date to Achieve 07/13/25  -CS     LTG 1 Pt will be independent with advance HEP and postural awareness for continued management of pain and symptoms.  -CS     LTG 1 Progress Ongoing  -CS     LTG 2 Pt will improve cervical L rotation AROM from 38 degrees to 50 degrees to improve ability to drive safely.  -CS     LTG 2 Progress Ongoing  -CS     LTG 3 Patient will report 75% reduction in mid thoracic pain leading to greater  ease with ADLs, functional tasks and yard work.  -CS     LTG 3 Progress Ongoing  -CS     LTG 4 Patient will report reduction in headache frequency from 1x/wk to </= 0 headaches per week over the last four weekend to indicate reduction in suboccipital muscle tension and improved posture.  -CS     LTG 4 Progress Ongoing  -CS     LTG 5 Patient will report pain at 7/10 at worst compared to 9/10 at worst at initial evaluation to indicate reduction in pain intensity and improved activity tolerance.  -CS     LTG 5 Progress Ongoing;Progressing  -CS               User Key  (r) = Recorded By, (t) = Taken By, (c) = Cosigned By      Initials Name Provider Type    CS Raj Corral, PT Physical Therapist                    Therapy Education  Education Details: Updated HEP  Given: HEP, Symptoms/condition management, Pain management, Posture/body mechanics  Program: New, Reinforced  How Provided: Verbal, Demonstration, Written  Provided to: Patient  Level of Understanding: Teach back education performed, Verbalized, Demonstrated              Time Calculation:   Start Time: 1141  Stop Time: 1224  Time Calculation (min): 43 min  Timed Charges  10434 - PT Therapeutic Exercise Minutes: 20  39634 - PT Manual Therapy Minutes: 23  Total Minutes  Timed Charges Total Minutes: 43   Total Minutes: 43  Therapy Charges for Today       Code Description Service Date Service Provider Modifiers Qty    00740841691  PT THER PROC EA 15 MIN 6/19/2025 Raj Corral, PT GP 1    [id number]  PT MANUAL THERAPY EA 15 MIN 6/19/2025 Raj Corral, PT GP 2                      Raj Corral PT  6/19/2025

## 2025-06-19 NOTE — PROGRESS NOTES
CHIEF COMPLAINT  Back and neck pain     Subjective   Kenneth Jean Baptiste is a 69 y.o. male.   He presents to the office for evaluation of cervical and thoracic pain. He was referred here by Dr. Mu Can.     Patient reports that back and neck pain have been an ongoing issue for many years. History of LIDA's in the past that offered mixed results. He also notes he was recently diagnosed with Fibromyalgia.     Today pain is 2/10VAS in severity (severity in pain varies based on activity level and can increase to 6/10 or higher). His main complaint today is neck pain that radiates into bilateral shoulders, right side worse than left. Pain will intermittently radiate down bilateral arms. Notes intermittent tinnitus in bilateral ears. He also has right sided mid back pain located just beneath his shoulder pain. Describes this pain as a nearly continuous ache. Pain is worsened by prolonged position, bending/twisting, weather changes, and increased physical activity. Pain improves with rest/reposition, heat/ice, and medications. He is currently going to PT twice a week and notes that this has been somewhat helpful to his pain.    Current medication regimen includes Tramadol 50mg and Gabapentin 100mg TID. He also utilizes CBD oil as needed. He takes Xanax at night to help with sleep.     Previously seen by Neurology for left sided facial pain - has since resolved     Procedures ([facility]):  1/28/25 - C7-T1 LIDA - no relief  10/10/24 - C7-T1 LIDA  4/23/24 - LIDA     Past therapies:  Physical Therapy: Yes  Chiropractor: Yes  Massage Therapy: Yes  TENS: Yes  Neck or back surgery: No  Past pain management: No    Back Pain  Chronicity:  Chronic  Onset:  More than 1 year ago  Frequency:  Constantly  Progression since onset:  Coming and going  Pain location:  Thoracic spine and lumbar spine  Radiates to:  Does not radiate  Pain-numeric:  2/10  Pain severity:  Mild  Aggravated by:  Bending, standing, certain positions and lying  down  Associated symptoms: numbness    Associated symptoms: no weakness    Treatments tried:  Physical therapy, heat, ice, chiropractic and analgesics  Improvement on treatment:  Mild  Neck Pain   This is a chronic problem. The problem occurs constantly. The problem has been waxing and waning. The pain is associated with an unknown factor. The pain is present in the right side, left side and midline. The quality of the pain is described as aching. The pain is at a severity of 2/10. The pain is mild. The symptoms are aggravated by position, twisting, stress and bending. Associated symptoms include numbness. Pertinent negatives include no weakness. He has tried neck support, oral narcotics, ice, heat and chiropractic manipulation for the symptoms. The treatment provided mild relief.     PEG Assessment   What number best describes your pain on average in the past week?4  What number best describes how, during the past week, pain has interfered with your enjoyment of life?4  What number best describes how, during the past week, pain has interfered with your general activity?  3    Current Outpatient Medications:     allopurinol (ZYLOPRIM) 300 MG tablet, 90 tablets., Disp: , Rfl:     ALPRAZolam (XANAX) 0.5 MG tablet, 30, Disp: , Rfl:     Aspirin-Acetaminophen-Caffeine (EXCEDRIN PO), Take 1 tablet by mouth As Needed., Disp: , Rfl:     gabapentin (NEURONTIN) 100 MG capsule, Take 1 capsule by mouth 3 (Three) Times a Day., Disp: , Rfl:     ketoconazole (NIZORAL) 2 % shampoo, WASH TOPICALLY TO THE SCALP AND EYEBROWS DAILY AS NEEDED, Disp: , Rfl:     lactulose (CHRONULAC) 10 GM/15ML solution, Take 30 mL by mouth 2 (Two) Times a Day., Disp: , Rfl:     lansoprazole (PREVACID) 15 MG capsule, Take 1 capsule by mouth Daily., Disp: , Rfl:     Linzess 145 MCG capsule capsule, Take 1 capsule by mouth Daily., Disp: , Rfl:     liothyronine (CYTOMEL) 5 MCG tablet, Take 1 tablet by mouth., Disp: , Rfl:     meloxicam (MOBIC) 15 MG tablet,  "Take 1 tablet by mouth Daily., Disp: , Rfl:     metoprolol succinate XL (TOPROL-XL) 50 MG 24 hr tablet, Take 1 tablet by mouth Daily., Disp: , Rfl:     Synthroid 88 MCG tablet, Take 1 tablet by mouth. Take 1 tablet daily 6 days a week, Disp: , Rfl:     traMADol (ULTRAM) 50 MG tablet, Take 1 tablet by mouth Every 6 (Six) Hours As Needed for Moderate Pain., Disp: , Rfl:     The following portions of the patient's history were reviewed and updated as appropriate: allergies, current medications, past family history, past medical history, past social history, past surgical history, and problem list.    REVIEW OF PERTINENT MEDICAL DATA  [place] office note from Dr. Mu Can from 6/4/2025.  Patient presents for follow-up.  He has a history of chronic neck and thoracic back pain.  Patient states he was recently told by his PCP that he has fibromyalgia.  He notes relief with epidural injections in the past however they are not currently effective.  Nothing surgical recommended at this time.  Dr. Can believes the patient has arthritic facet joints in both the cervical and thoracic spine.  He recommended patient move towards an ablation approach.  He takes tramadol and gabapentin as prescribed by his PCP.  He was referred to pain management for ablations.  Will follow-up in 9 months.          Review of Systems   Gastrointestinal:  Positive for constipation. Negative for diarrhea.   Genitourinary:  Positive for difficulty urinating.   Musculoskeletal:  Positive for back pain and neck pain.   Neurological:  Positive for numbness. Negative for weakness.   Psychiatric/Behavioral:  Positive for sleep disturbance. Negative for suicidal ideas. The patient is not nervous/anxious.      I have reviewed and confirmed the accuracy of the ROS as documented by the MA/LPN/OCTAVIA Ayala    Vitals:    06/19/25 0906   BP: 136/72   Pulse: 56   Resp: 18   SpO2: 100%   Weight: 68.5 kg (151 lb)   Height: 172.7 cm (68\") "   PainSc: 2   Comment: neck pain 2/10   PainLoc: Back     Objective     Physical Exam  Constitutional:       Appearance: Normal appearance.   HENT:      Head: Normocephalic.   Cardiovascular:      Rate and Rhythm: Normal rate and regular rhythm.   Pulmonary:      Effort: Pulmonary effort is normal.      Breath sounds: Normal breath sounds.   Musculoskeletal:      Cervical back: Tenderness and bony tenderness present. Pain with movement present. Decreased range of motion.      Thoracic back: Tenderness present.      Lumbar back: Tenderness and bony tenderness present. Decreased range of motion.        Back:       Comments: + pain with cervical flexion/extension    Skin:     General: Skin is warm and dry.      Capillary Refill: Capillary refill takes less than 2 seconds.   Neurological:      General: No focal deficit present.      Mental Status: He is alert and oriented to person, place, and time.   Psychiatric:         Mood and Affect: Mood normal.         Behavior: Behavior normal.         Thought Content: Thought content normal.         Cognition and Memory: Cognition normal.       Assessment & Plan   Diagnoses and all orders for this visit:    1. Cervical spondylosis without myelopathy (Primary)    2. Degeneration of intervertebral disc of mid-cervical region, unspecified spinal level    3. DDD (degenerative disc disease), cervical    4. Chronic neck pain      --- Kenneth RAMONA Markel reports a pain score of 2.  Given his pain assessment as noted, treatment options were discussed and the following options were decided upon as a follow-up plan to address the patient's pain: continuation of current treatment plan for pain.    --- Bilateral C5-C7 (vs C4-C6) MBB x 2 with goal of RFA - please evaluate painful levels under fluro   -------  Education about Medial Branch Blockade and RF Therapy:  This medial branch blockade (MBB) suggested is intended for diagnostic purposes, with the intent of offering the patient  "Radiofrequency thermal rhizotomy (RF) if the MBB is diagnostically effective.  The diagnostic blockade is necessary to determine the likelihood that RF therapy could be efficacious in providing long term relief to the patient.    Medial branches are sensory nerve branches that connect to a facet joint and transmit sensations & pain signals from that joint.  Facet is a term for the type of joints found in the spine.  Medial branches are the nerves that go to a facet, and therefore are also sometimes called \"facet joint nerves\" (FJNs).      In a medial branch blockade procedure, xray fluoroscopy is used to verify the locations of the outside of the joint lines which are being targeted.  Under xray guidance, needles are placed to these areas.  Contrast dye is injected to confirm proper placement, with dye flowing over the joint area, and to ensure that the dye does not flow into unintended areas such as a vein.  When this is confirmed, local anesthetic is injected to block the medial branch at that joint level.      If MBBs are diagnostically successful in blocking pain, then the patient is most likely a great candidate for Radiofrequency of those facet joint nerves.  In the RF procedure, needles are placed to the joint lines in the same fashion, and after testing, the needle tips are heated to thermally treat the nerves, blocking the nerves by in essence damaging the nerves with the heat treatment(non-pulsed).       Medically, a successful RF procedure should provide a patient with 50% pain relief or more for at least 6 months.  Clinical experience suggests that successful patients receive relief more in the range of 12 months on average.  We also discussed that a fortunate minority of patients receive therapeutic success from the MBB, and may not require RF ablation.  If a patient receives more than 8 weeks of relief from MBB, then occasional repeat MBB for therapeutic purposes is a very reasonable alternative " therapy.  This course of therapy is consistent with our LCDs according to our CMS  in the area, and therefore other insurance providers should follow accordingly.  We will monitor our patients to screen for these therapeutic responders and will offer RF therapy only when necessary.      We discussed that MBB & RF are not without risks.  Guidelines regarding anticoagulant use & neuraxial procedures will be respected.  Patients that are ill or otherwise may be at risk for sepsis will not have their spines accessed by neuraxial injections of any type.  This patient will not be offered these therapies if there is an increased risk.   We discussed that there is a risk of postprocedural pain and also a risk of worsening of clinical picture with these procedures as with any neuraxial procedure.    -------  Discussed with the patient that sedation is optional for this procedure.  The sedation offered is called conscious sedation which is different from general anesthesia that is utilized in surgical procedures. The dosing of the sedation is determined by the physician and they will be monitored throughout the procedure. With conscious sedation it is possible to remember parts or all of the procedure, this is normal. They will need to have a  with them as driving is prohibited following conscious sedation.     NPO instructions for conscious sedation:  --- Do not eat 8 hours prior to the procedure.   --- Do not drink any dairy or citrus 4 hours prior to the procedure.   --- Do not drink anything, including clear liquids, 2 hours prior to procedure.     If the NPO instructions are not followed then the procedure may be performed without sedation or the procedure will need to be rescheduled.   --- Follow-up for procedure     Diagnostic Facet Joint Procedure:   MBB   The first diagnostic facet joint procedure is considered medically reasonable and necessary for the diagnosis and treatment of chronic pain for  this patient due to the patient meeting all of the following criteria:    - 1. Moderate to severe chronic neck or low back pain, predominantly axial, that causes functional deficit measured on pain or disability scale.  - 2. Pain present for minimum of 3 months with documented failure to respond to noninvasive conservative management (as tolerated)  - 3. Absence of untreated radiculopathy or neurogenic claudication (except for radiculopathy caused by facet joint synovial cyst)  - 4. There is no non-facet pathology per clinical assessment or radiology studies that could explain the source of the patient’s pain, including but not limited to fracture, tumor, infection, or significant deformity.       Pain / Disability Scale  The scale used for measurement of pain and/or disability for this patient was the [place] back pain disability scale.  The score was 17 on 06/19/2025    ESTRELLA REPORT  As the clinician, I personally reviewed the ESTRELLA from 6/19/25 while the patient was in the office today.    Dictated utilizing Dragon dictation.

## 2025-06-26 ENCOUNTER — HOSPITAL ENCOUNTER (OUTPATIENT)
Dept: PHYSICAL THERAPY | Facility: HOSPITAL | Age: 70
Setting detail: THERAPIES SERIES
Discharge: HOME | End: 2025-06-26
Payer: MEDICARE

## 2025-06-26 DIAGNOSIS — G89.29 CHRONIC NECK PAIN: Primary | ICD-10-CM

## 2025-06-26 DIAGNOSIS — M54.2 CHRONIC NECK PAIN: Primary | ICD-10-CM

## 2025-06-26 DIAGNOSIS — R29.3 POOR POSTURE: ICD-10-CM

## 2025-06-26 PROCEDURE — 97140 MANUAL THERAPY 1/> REGIONS: CPT

## 2025-06-26 PROCEDURE — 97110 THERAPEUTIC EXERCISES: CPT

## 2025-06-27 NOTE — THERAPY TREATMENT NOTE
Outpatient Physical Therapy Ortho Treatment Note  [facility]     Patient Name: Kenneth Jean Baptiste  : 1955  MRN: [id number]  Today's Date: 2025      Visit Date: 2025    Visit Dx:    ICD-10-CM ICD-9-CM   1. Chronic neck pain  M54.2 723.1    G89.29 338.29   2. Poor posture  R29.3 781.92       Patient Active Problem List   Diagnosis    Degeneration of intervertebral disc of mid-cervical region    Left facial numbness    Migraine without aura and without status migrainosus, not intractable    Graves' disease    Postoperative hypothyroidism    Heart palpitations    Chest pain    Thrombocytopenia    Bilateral leg weakness    Cervical disc disorder at C5-C6 level with radiculopathy    Chronic bilateral thoracic back pain    DDD (degenerative disc disease), lumbar    Chronic neck pain    Neck muscle spasm    Scapular dysfunction    Abnormal MRI, shoulder    Cervical spinal stenosis    Cervical disc disorder at C6-C7 level with radiculopathy    Cervical spondylosis without myelopathy    DDD (degenerative disc disease), cervical        Past Medical History:   Diagnosis Date    Abnormal ECG     ALS (amyotrophic lateral sclerosis)     Anxiety     Heart aretyhmia, sleeplessness    Arthritis     OSTEO    Avascular necrosis     HISTORY OF BOTH LEFT AND RIGHT HIPS    Bilateral inguinal hernia     Cervical disc disorder     Chronic constipation     Chronic neck pain     Colon polyps     HISTORY OF    Diverticulitis     Diverticulosis     Fibromyalgia, primary     GERD (gastroesophageal reflux disease)     Gout     Headache, tension-type     HL (hearing loss)     Hypertension     MED SHORT PERIOD OF TIME. NOTHING RECENTLY    IBS (irritable bowel syndrome)     WITH CONSTIPATION    Insomnia     Loss of sensation     BOWEL.    Low back pain     Lumbosacral disc disease     Memory loss     Migraine     Palpitation     DURING THE NIGHT TIMES    Peripheral neuropathy     GUSTAVO BALLS OF FEET    Personal history  of COVID-19     SEPT 13 2021. RESULT FROM [facility] [place]    PONV (postoperative nausea and vomiting)     Thoracic disc disorder     Thyroid disease     GOITER REMOVED-VASCULAR MASS. BENIGN,PARTIAL THYROIDECTOMY    Umbilical hernia         Past Surgical History:   Procedure Laterality Date    APPENDECTOMY  03/2013    Dr. Granado    BICEPS TENDON REPAIR Left 08/28/2012    COLONOSCOPY N/A 02/2021    EPIDURAL BLOCK      HAND SURGERY Left     FRACTURED HAND    HIP SURGERY Left 1982    NERVE SURGERY,     HIP SURGERY      LEFT AND RIGHT BONFIGLIO BONE GRAFT 11/82 AND 1/83    INGUINAL HERNIA REPAIR Right 2000    Dr. Mckenna    INGUINAL HERNIA REPAIR Bilateral 12/1/2021    Procedure: BILATERAL INGUINAL HERNIA REPAIR LAPAROSCOPIC WITH [employer] ROBOT UMBILICAL HERNIA REPAIR;  Surgeon: Zbigniew Starr MD;  [facility]: [facility];  Service: Robotics - [employer];  Laterality: Bilateral;    ORIF ULNA/RADIUS FRACTURES Right     WITH HARDWARE. SINCE REMOVED    PARTIAL HIP ARTHROPLASTY Left 1984    THYROIDECTOMY, PARTIAL  06/08/2016    TOTAL HIP ARTHROPLASTY REVISION Left     SWITCHED TO FULL TOTAL                        PT Assessment/Plan       Row Name 06/27/25 0718          PT Assessment    Assessment Comments Pt reporting improving mobility in T spine with ability to gain relief using his at home foam roller that he previously used for management of condition. Added several posterior strengthening exercises today with good tolerance. Pt did have one brief episode of L sided mid thoracic spasming with attempted SAP so we held on adding that today. L sided symptoms reduced with manual therapy with several cavitation noted with T spine PAs and pt continues to report benefit from T spine mobilizations. He was planning to pressure wash driveway so we discussed breaking task up into smaller duration sessions and posture to consider while working.  -LB        PT Plan    PT Plan Comments add strengthening to HEP if tolerated,  consider return to SAP if able  -LB               User Key  (r) = Recorded By, (t) = Taken By, (c) = Cosigned By      Initials Name Provider Type    LB Angela Araya, PT Physical Therapist                       OP Exercises       Row Name 06/27/25 0700 06/26/25 1500          Subjective    Subjective Comments -- I am doing pretty well. My back can actually move again.  -LB        Subjective Pain    Able to rate subjective pain? -- yes  -LB     Pre-Treatment Pain Level -- 2  -LB     Post-Treatment Pain Level -- 2  -LB        Total Minutes    40712 - PT Therapeutic Exercise Minutes -- 25  -LB     24310 - PT Manual Therapy Minutes 15  -LB --        Exercise 1    Exercise Name 1 -- UBE  -LB     Time 1 -- 4 mins  -LB        Exercise 2    Exercise Name 2 -- seated T spine extension in chair  -LB     Reps 2 -- 3  -LB     Time 2 -- 15  -LB     Additional Comments -- vertical towel roll; head supported  -LB        Exercise 3    Exercise Name 3 -- door stretch  -LB     Cueing 3 -- Verbal;Demo  -LB     Sets 3 -- 1  -LB     Reps 3 -- 4  -LB     Time 3 -- 20sec  -LB        Exercise 4    Exercise Name 4 -- supine chin tucks  -LB     Cueing 4 -- Verbal;Demo  -LB     Sets 4 -- 2  -LB     Reps 4 -- 10  -LB     Time 4 -- 5s  -LB     Additional Comments -- with banded flexion RTB  -LB        Exercise 5    Exercise Name 5 -- seated elbow slides at wall  -LB     Cueing 5 -- Verbal;Demo  -LB     Sets 5 -- 1  -LB     Reps 5 -- 15  -LB     Additional Comments -- hands behind head, facing wall, elbows up/down wall  -LB        Exercise 6    Exercise Name 6 -- standing open book  -LB     Cueing 6 -- Verbal;Tactile  -LB     Sets 6 -- 1  -LB     Reps 6 -- 15e  -LB     Time 6 -- RTB  -LB        Exercise 7    Exercise Name 7 -- cat/cow  -LB     Cueing 7 -- Verbal;Demo  -LB     Sets 7 -- 2  -LB     Reps 7 -- 10  -LB     Time 7 -- 10s  -LB        Exercise 8    Exercise Name 8 -- standing star  -LB     Reps 8 -- 10  -LB     Time 8 -- each  -LB      Additional Comments -- RTB  -LB        Exercise 9    Exercise Name 9 -- Thread the needle at wall  -LB     Cueing 9 -- Verbal;Demo  -LB     Sets 9 -- 1  -LB     Reps 9 -- 15ea  -LB     Time 9 -- 3-5s  -LB               User Key  (r) = Recorded By, (t) = Taken By, (c) = Cosigned By      Initials Name Provider Type    LB Angela Araya, PT Physical Therapist                             Manual Rx (Last 36 Hours)       Manual Treatments       Row Name 06/27/25 0700             Total Minutes    24361 - PT Manual Therapy Minutes 15  -LB         Manual Rx 1    Manual Rx 1 Location Thoracic PAs, grd 3-4, STM to rhomboids  -LB      Manual Rx 1 Grade 1-4 with oscillations  -LB      Manual Rx 1 Duration 15  -LB                User Key  (r) = Recorded By, (t) = Taken By, (c) = Cosigned By      Initials Name Provider Type    LB Angela Araya, PT Physical Therapist                     PT OP Goals       Row Name 06/26/25 1500          PT Short Term Goals    STG Date to Achieve 06/13/25  -LB     STG 1 Pt will be independent with initial HEP and postural awareness to improve pain and symptoms.  -LB     STG 1 Progress Met  -LB     STG 2 Pt to be educated in/verbalize understanding of the importance of posture/ergonomics in association with their condition to facilitate self management of their condition.  -LB     STG 2 Progress Ongoing;Progressing  -LB     STG 3 Pt will improve cervical L rotation AROM from 38 degrees to 45 degrees to improve ability to drive safely.  -LB     STG 3 Progress Met  -LB     STG 4 Patient will report 50% reduction in mid thoracic pain leading to greater ease with ADLs, functional tasks and yard work.  -LB     STG 4 Progress Ongoing  -LB        Long Term Goals    LTG Date to Achieve 07/13/25  -LB     LTG 1 Pt will be independent with advance HEP and postural awareness for continued management of pain and symptoms.  -LB     LTG 1 Progress Ongoing  -LB     LTG 2 Pt will improve cervical L rotation AROM from  38 degrees to 50 degrees to improve ability to drive safely.  -LB     LTG 2 Progress Ongoing  -LB     LTG 3 Patient will report 75% reduction in mid thoracic pain leading to greater ease with ADLs, functional tasks and yard work.  -LB     LTG 3 Progress Ongoing  -LB     LTG 4 Patient will report reduction in headache frequency from 1x/wk to </= 0 headaches per week over the last four weekend to indicate reduction in suboccipital muscle tension and improved posture.  -LB     LTG 4 Progress Ongoing  -LB     LTG 5 Patient will report pain at 7/10 at worst compared to 9/10 at worst at initial evaluation to indicate reduction in pain intensity and improved activity tolerance.  -LB     LTG 5 Progress Ongoing;Progressing  -LB               User Key  (r) = Recorded By, (t) = Taken By, (c) = Cosigned By      Initials Name Provider Type    Angela Tuttle PT Physical Therapist                    Therapy Education  Given: Mobility training, Symptoms/condition management, Posture/body mechanics  Program: Reinforced  How Provided: Verbal  Provided to: Patient  Level of Understanding: Verbalized              Time Calculation:   Start Time: 1445  Stop Time: 1530  Time Calculation (min): 45 min  Total Timed Code Minutes- PT: 40 minute(s)  Timed Charges  46290 - PT Therapeutic Exercise Minutes: 25  19521 - PT Manual Therapy Minutes: 15  Total Minutes  Timed Charges Total Minutes: 15   Total Minutes: 15  Therapy Charges for Today       Code Description Service Date Service Provider Modifiers Qty    61236148734 HC PT MANUAL THERAPY EA 15 MIN 6/26/2025 Angela Araya, PT GP 1    79736299737 HC PT THER PROC EA 15 MIN 6/26/2025 Angela Araya PT GP 2                      Angela Araya PT  6/27/2025

## 2025-07-02 ENCOUNTER — HOSPITAL ENCOUNTER (OUTPATIENT)
Dept: PHYSICAL THERAPY | Facility: HOSPITAL | Age: 70
Setting detail: THERAPIES SERIES
Discharge: HOME | End: 2025-07-02
Payer: MEDICARE

## 2025-07-02 DIAGNOSIS — M54.2 CHRONIC NECK PAIN: Primary | ICD-10-CM

## 2025-07-02 DIAGNOSIS — R29.3 POOR POSTURE: ICD-10-CM

## 2025-07-02 DIAGNOSIS — G89.29 CHRONIC NECK PAIN: Primary | ICD-10-CM

## 2025-07-02 PROCEDURE — 97140 MANUAL THERAPY 1/> REGIONS: CPT

## 2025-07-02 PROCEDURE — 97110 THERAPEUTIC EXERCISES: CPT

## 2025-07-02 NOTE — THERAPY TREATMENT NOTE
Outpatient Physical Therapy Ortho Treatment Note  [facility]     Patient Name: Kenneth Jean Baptiste  : 1955  MRN: [id number]  Today's Date: 2025      Visit Date: 2025    Visit Dx:    ICD-10-CM ICD-9-CM   1. Chronic neck pain  M54.2 723.1    G89.29 338.29   2. Poor posture  R29.3 781.92       Patient Active Problem List   Diagnosis    Degeneration of intervertebral disc of mid-cervical region    Left facial numbness    Migraine without aura and without status migrainosus, not intractable    Graves' disease    Postoperative hypothyroidism    Heart palpitations    Chest pain    Thrombocytopenia    Bilateral leg weakness    Cervical disc disorder at C5-C6 level with radiculopathy    Chronic bilateral thoracic back pain    DDD (degenerative disc disease), lumbar    Chronic neck pain    Neck muscle spasm    Scapular dysfunction    Abnormal MRI, shoulder    Cervical spinal stenosis    Cervical disc disorder at C6-C7 level with radiculopathy    Cervical spondylosis without myelopathy    DDD (degenerative disc disease), cervical        Past Medical History:   Diagnosis Date    Abnormal ECG     ALS (amyotrophic lateral sclerosis)     Anxiety     Heart aretyhmia, sleeplessness    Arthritis     OSTEO    Avascular necrosis     HISTORY OF BOTH LEFT AND RIGHT HIPS    Bilateral inguinal hernia     Cervical disc disorder     Chronic constipation     Chronic neck pain     Colon polyps     HISTORY OF    Diverticulitis     Diverticulosis     Fibromyalgia, primary     GERD (gastroesophageal reflux disease)     Gout     Headache, tension-type     HL (hearing loss)     Hypertension     MED SHORT PERIOD OF TIME. NOTHING RECENTLY    IBS (irritable bowel syndrome)     WITH CONSTIPATION    Insomnia     Loss of sensation     BOWEL.    Low back pain     Lumbosacral disc disease     Memory loss     Migraine     Palpitation     DURING THE NIGHT TIMES    Peripheral neuropathy     GUSTAVO BALLS OF FEET    Personal history  of COVID-19     SEPT 13 2021. [facility]ULT FROM [facility] [place]    PONV (postoperative nausea and vomiting)     Thoracic disc disorder     Thyroid disease     GOITER REMOVED-VASCULAR MASS. BENIGN,PARTIAL THYROIDECTOMY    Umbilical hernia         Past Surgical History:   Procedure Laterality Date    APPENDECTOMY  03/2013    Dr. Granado    BICEPS TENDON REPAIR Left 08/28/2012    COLONOSCOPY N/A 02/2021    EPIDURAL BLOCK      HAND SURGERY Left     FRACTURED HAND    HIP SURGERY Left 1982    NERVE SURGERY,     HIP SURGERY      LEFT AND RIGHT BONFIGLIO BONE GRAFT 11/82 AND 1/83    INGUINAL HERNIA REPAIR Right 2000    Dr. Mckenna    INGUINAL HERNIA REPAIR Bilateral 12/1/2021    Procedure: BILATERAL INGUINAL HERNIA REPAIR LAPAROSCOPIC WITH [employer] ROBOT UMBILICAL HERNIA REPAIR;  Surgeon: Zbigniew Starr MD;  Location: [facility] OR;  Service: Robotics - [employer];  Laterality: Bilateral;    ORIF ULNA/RADIUS FRACTURES Right     WITH HARDWARE. SINCE REMOVED    PARTIAL HIP ARTHROPLASTY Left 1984    THYROIDECTOMY, PARTIAL  06/08/2016    TOTAL HIP ARTHROPLASTY REVISION Left     SWITCHED TO FULL TOTAL        PT Ortho       Row Name 07/02/25 1100       Vital Signs    Pre Systolic BP Rehab 153  -CS    Pre Treatment Diastolic BP 89  -CS              User Key  (r) = Recorded By, (t) = Taken By, (c) = Cosigned By      Initials Name Provider Type    CS Raj Corral, PT Physical Therapist                     PT Neuro       Row Name 07/02/25 1100             Coordination    Coordination WNL? WNL  -CS      Coordination Tests Rapid Alternating;Finger to nose eyes open  -CS      Rapid Alternating Bilteral:;Intact  -CS      Finger to Nose Eyes Open Bilteral:;Intact  -CS                User Key  (r) = Recorded By, (t) = Taken By, (c) = Cosigned By      Initials Name Provider Type    CS Raj Corral, PT Physical Therapist                             PT Assessment/Plan       Row Name 07/02/25 1100          PT Assessment    Assessment Comments Pt  "reports to clinic reporting he just \"had an attack\" it was full body pain all over and completely out of nowhere. He states it has improved, requests to have his BP taken, measured at 153/89 which is elevated for him. Pt stating he does not need to go to the ER, advised pt if symptoms happen again to go to the ER. Pt denies chest pain/tightness, changes in speech, any weakness, or facial changes. Provided pt w/ coordination tasks and has no limitations. Pt would like to participate w/ therapy today but advised pt to take things slow and be vocal about his symptoms throughout. Pt noticeably slower to perform his exercises this date and reporting he feels spacey but does not feel any worse, his severe pain has decreased as session prolonged. Provided pt w/ gentle manual therapy to see if symptoms progressed or regressed, pt reports decreased symptoms and no more foggy symptoms. Educated pt for red flags to monitor while at home w/ advice to speak w/ his wife to help monitor over coming days. Unable to progress much this date. Will continue to monitor next visit.  -CS        PT Plan    PT Plan Comments Any red flag symptoms this date? add strengthening to HEP if tolerated, consider return to SAP if able  -CS               User Key  (r) = Recorded By, (t) = Taken By, (c) = Cosigned By      Initials Name Provider Type    Raj Lorenzana PT Physical Therapist                       OP Exercises       Row Name 07/02/25 1100             Subjective    Subjective Comments I just had a full body attack of pain, I haven't had that before at all  -CS         Subjective Pain    Able to rate subjective pain? yes  -CS      Pre-Treatment Pain Level 6  -CS      Post-Treatment Pain Level 3  -CS         Total Minutes    63885 - PT Therapeutic Exercise Minutes 13  -CS      46140 - PT Manual Therapy Minutes 26  -CS         Exercise 1    Exercise Name 1 UBE  -CS      Time 1 4 mins  -CS         Exercise 2    Exercise Name 2 seated T spine " extension in chair  -CS      Reps 2 15  -CS      Time 2 3  -CS      Additional Comments Horizontal foam roll  -CS         Exercise 4    Exercise Name 4 Seated chin tucks  -CS      Cueing 4 Verbal;Demo  -CS      Sets 4 2  -CS      Reps 4 10  -CS      Time 4 5s  -CS      Additional Comments ball at wall  -CS         Exercise 6    Exercise Name 6 standing open book  -CS      Cueing 6 Verbal;Tactile  -CS      Sets 6 1  -CS      Reps 6 15e  -CS                User Key  (r) = Recorded By, (t) = Taken By, (c) = Cosigned By      Initials Name Provider Type    CS Raj Corral, PT Physical Therapist                             Manual Rx (Last 36 Hours)       Manual Treatments       Row Name 07/02/25 1100             Total Minutes    94000 - PT Manual Therapy Minutes 26  -CS         Manual Rx 1    Manual Rx 1 Location Thoracic PAs, grd 3-4, STM to rhomboids  -CS      Manual Rx 1 Grade 1-4 with oscillations  Initiated w/ grade 1 to assess tolerance and slowly progressed, holding conversation through entirety of manual techniques to ensure no regressions.  -CS      Manual Rx 1 Duration 21  -CS         Manual Rx 2    Manual Rx 2 Location UT/LS STM pt prone  -CS                User Key  (r) = Recorded By, (t) = Taken By, (c) = Cosigned By      Initials Name Provider Type    CS Raj Corral, PT Physical Therapist                     PT OP Goals       Row Name 07/02/25 1100          PT Short Term Goals    STG Date to Achieve 06/13/25  -CS     STG 1 Pt will be independent with initial HEP and postural awareness to improve pain and symptoms.  -CS     STG 1 Progress Met  -CS     STG 2 Pt to be educated in/verbalize understanding of the importance of posture/ergonomics in association with their condition to facilitate self management of their condition.  -CS     STG 2 Progress Ongoing;Progressing  -CS     STG 3 Pt will improve cervical L rotation AROM from 38 degrees to 45 degrees to improve ability to drive safely.  -CS     STG 3  Progress Met  -CS     STG 4 Patient will report 50% reduction in mid thoracic pain leading to greater ease with ADLs, functional tasks and yard work.  -CS     STG 4 Progress Ongoing  -CS        Long Term Goals    LTG Date to Achieve 07/13/25  -CS     LTG 1 Pt will be independent with advance HEP and postural awareness for continued management of pain and symptoms.  -CS     LTG 1 Progress Ongoing  -CS     LTG 2 Pt will improve cervical L rotation AROM from 38 degrees to 50 degrees to improve ability to drive safely.  -CS     LTG 2 Progress Ongoing  -CS     LTG 3 Patient will report 75% reduction in mid thoracic pain leading to greater ease with ADLs, functional tasks and yard work.  -CS     LTG 3 Progress Ongoing  -CS     LTG 4 Patient will report reduction in headache frequency from 1x/wk to </= 0 headaches per week over the last four weekend to indicate reduction in suboccipital muscle tension and improved posture.  -CS     LTG 4 Progress Ongoing  -CS     LTG 5 Patient will report pain at 7/10 at worst compared to 9/10 at worst at initial evaluation to indicate reduction in pain intensity and improved activity tolerance.  -CS     LTG 5 Progress Ongoing;Progressing  -CS               User Key  (r) = Recorded By, (t) = Taken By, (c) = Cosigned By      Initials Name Provider Type    Raj Lorenzana PT Physical Therapist                    Therapy Education  Education Details: BP reading, pain management, monitoring his symptoms  Given: Pain management, Posture/body mechanics  Program: Reinforced  How Provided: Verbal  Provided to: Patient  Level of Understanding: Verbalized              Time Calculation:   Start Time: 1144  Stop Time: 1223  Time Calculation (min): 39 min  Timed Charges  25358 - PT Therapeutic Exercise Minutes: 13  02574 - PT Manual Therapy Minutes: 26  Total Minutes  Timed Charges Total Minutes: 39   Total Minutes: 39  Therapy Charges for Today       Code Description Service Date Service Provider  Modifiers Qty    95802599569  PT THER PROC EA 15 MIN 7/2/2025 Raj Corral, PT GP 1    10121939842 HC PT MANUAL THERAPY EA 15 MIN 7/2/2025 Raj Corral, PT GP 2                      Raj Corral, PT  7/2/2025

## 2025-07-07 ENCOUNTER — HOSPITAL ENCOUNTER (OUTPATIENT)
Dept: PHYSICAL THERAPY | Facility: HOSPITAL | Age: 70
Setting detail: THERAPIES SERIES
Discharge: HOME | End: 2025-07-07
Payer: MEDICARE

## 2025-07-07 DIAGNOSIS — G89.29 CHRONIC NECK PAIN: Primary | ICD-10-CM

## 2025-07-07 DIAGNOSIS — R29.3 POOR POSTURE: ICD-10-CM

## 2025-07-07 DIAGNOSIS — M54.2 CHRONIC NECK PAIN: Primary | ICD-10-CM

## 2025-07-07 PROCEDURE — 97110 THERAPEUTIC EXERCISES: CPT

## 2025-07-07 PROCEDURE — 97140 MANUAL THERAPY 1/> REGIONS: CPT

## 2025-07-07 NOTE — THERAPY TREATMENT NOTE
Outpatient Physical Therapy Ortho Treatment Note  [facility]     Patient Name: Kenneth Jean Baptiste  : 1955  MRN: [id number]  Today's Date: 2025      Visit Date: 2025    Visit Dx:    ICD-10-CM ICD-9-CM   1. Chronic neck pain  M54.2 723.1    G89.29 338.29   2. Poor posture  R29.3 781.92       Patient Active Problem List   Diagnosis    Degeneration of intervertebral disc of mid-cervical region    Left facial numbness    Migraine without aura and without status migrainosus, not intractable    Graves' disease    Postoperative hypothyroidism    Heart palpitations    Chest pain    Thrombocytopenia    Bilateral leg weakness    Cervical disc disorder at C5-C6 level with radiculopathy    Chronic bilateral thoracic back pain    DDD (degenerative disc disease), lumbar    Chronic neck pain    Neck muscle spasm    Scapular dysfunction    Abnormal MRI, shoulder    Cervical spinal stenosis    Cervical disc disorder at C6-C7 level with radiculopathy    Cervical spondylosis without myelopathy    DDD (degenerative disc disease), cervical        Past Medical History:   Diagnosis Date    Abnormal ECG     ALS (amyotrophic lateral sclerosis)     Anxiety     Heart aretyhmia, sleeplessness    Arthritis     OSTEO    Avascular necrosis     HISTORY OF BOTH LEFT AND RIGHT HIPS    Bilateral inguinal hernia     Cervical disc disorder     Chronic constipation     Chronic neck pain     Colon polyps     HISTORY OF    Diverticulitis     Diverticulosis     Fibromyalgia, primary     GERD (gastroesophageal reflux disease)     Gout     Headache, tension-type     HL (hearing loss)     Hypertension     MED SHORT PERIOD OF TIME. NOTHING RECENTLY    IBS (irritable bowel syndrome)     WITH CONSTIPATION    Insomnia     Loss of sensation     BOWEL.    Low back pain     Lumbosacral disc disease     Memory loss     Migraine     Palpitation     DURING THE NIGHT TIMES    Peripheral neuropathy     GUSTAVO BALLS OF FEET    Personal history  "of COVID-19     SEPT 13 2021. RESULT FROM [facility] [place]    PONV (postoperative nausea and vomiting)     Thoracic disc disorder     Thyroid disease     GOITER REMOVED-VASCULAR MASS. BENIGN,PARTIAL THYROIDECTOMY    Umbilical hernia         Past Surgical History:   Procedure Laterality Date    APPENDECTOMY  03/2013    Dr. Granado    BICEPS TENDON REPAIR Left 08/28/2012    COLONOSCOPY N/A 02/2021    EPIDURAL BLOCK      HAND SURGERY Left     FRACTURED HAND    HIP SURGERY Left 1982    NERVE SURGERY,     HIP SURGERY      LEFT AND RIGHT BONFIGLIO BONE GRAFT 11/82 AND 1/83    INGUINAL HERNIA REPAIR Right 2000    Dr. Mckenna    INGUINAL HERNIA REPAIR Bilateral 12/1/2021    Procedure: BILATERAL INGUINAL HERNIA REPAIR LAPAROSCOPIC WITH [employer] ROBOT UMBILICAL HERNIA REPAIR;  Surgeon: Zbigniew Starr MD;  Location: [facility];  Service: [employer];  Laterality: Bilateral;    ORIF ULNA/RADIUS FRACTURES Right     WITH HARDWARE. SINCE REMOVED    PARTIAL HIP ARTHROPLASTY Left 1984    THYROIDECTOMY, PARTIAL  06/08/2016    TOTAL HIP ARTHROPLASTY REVISION Left     SWITCHED TO FULL TOTAL                        PT Assessment/Plan       Row Name 07/07/25 1100          PT Assessment    Assessment Comments Pt returns to PT this date reporting he feels better to begin and has not had any \"weird\" symptoms since his previous session, overall he reports doing much better. Added TRX exercises for scapular strengthening and T spine mobility w/ addition of core strengthening for postural alignment awareness. Pt tolerates well but needs cueing frequently due to form management and compensation w/ UT use and use of rotation during lateral flexion movement. Spent increased time w/ strengthening exercises to progress away from manual therapy, pt appears to be responding well to manual but he needs more stability and strengthening his stabilizing muscles for improved long-term management. Updated HEP w/ strengthening exercises to " perform every other day and stretching every day.  -CS        PT Plan    PT Plan Comments Wall walks w/ TB, prone Is, Ts, and Ys?  -CS               User Key  (r) = Recorded By, (t) = Taken By, (c) = Cosigned By      Initials Name Provider Type    CS Raj Corral, PT Physical Therapist                       OP Exercises       Row Name 07/07/25 1100             Subjective    Subjective Comments No symptoms like last time, I actually feel really good today  -CS         Subjective Pain    Able to rate subjective pain? yes  -CS      Pre-Treatment Pain Level 1  -CS      Post-Treatment Pain Level 1  -CS         Total Minutes    88571 - PT Therapeutic Exercise Minutes 34  -CS      40612 - PT Manual Therapy Minutes 15  -CS         Exercise 1    Exercise Name 1 UBE  -CS      Time 1 4 mins  -CS         Exercise 3    Exercise Name 3 door stretch  -CS      Cueing 3 Verbal;Demo  -CS      Sets 3 1  -CS      Reps 3 4  -CS      Time 3 20sec  -CS         Exercise 4    Exercise Name 4 Supine chin tucks w/ HA  -CS      Cueing 4 Verbal;Demo  -CS      Sets 4 2  -CS      Reps 4 10  -CS      Time 4 3s  -CS      Additional Comments RTB  -CS         Exercise 7    Exercise Name 7 cat/cow  -CS      Cueing 7 Verbal;Demo  -CS      Sets 7 1  -CS      Reps 7 10  -CS      Time 7 10s  -CS      Additional Comments Increased neck pain, likely D/C  -CS         Exercise 8    Exercise Name 8 standing star  -CS      Reps 8 10  -CS      Time 8 each  -CS      Additional Comments RTB  -CS         Exercise 9    Exercise Name 9 Thread the needle in Qped  -CS      Cueing 9 Verbal;Demo  -CS      Sets 9 1  -CS      Reps 9 15ea  -CS      Time 9 3-5s  -CS         Exercise 10    Exercise Name 10 TRX rows  -CS      Cueing 10 Verbal;Demo  -CS      Sets 10 1  -CS      Reps 10 12  -CS         Exercise 11    Exercise Name 11 TRX Y walkouts  -CS      Cueing 11 Verbal;Demo  -CS      Sets 11 1  -CS      Reps 11 15  -CS      Additional Comments T-spine ext  -CS          Exercise 12    Exercise Name 12 Lateral flex w/ KB  -CS      Cueing 12 Verbal;Demo  -CS      Reps 12 10 ea  -CS      Additional Comments 15#KB  -CS         Exercise 13    Exercise Name 13 SAPs  -CS      Cueing 13 Verbal;Tactile  -CS      Sets 13 2  -CS      Reps 13 10  -CS      Additional Comments 3# DB  -CS                User Key  (r) = Recorded By, (t) = Taken By, (c) = Cosigned By      Initials Name Provider Type    CS Raj Corral, PT Physical Therapist                             Manual Rx (Last 36 Hours)       Manual Treatments       Row Name 07/07/25 1100             Total Minutes    14795 - PT Manual Therapy Minutes 15  -CS         Manual Rx 1    Manual Rx 1 Location Thoracic PAs, grd 3-4,  -CS      Manual Rx 1 Grade 1-4 with oscillations  -CS      Manual Rx 1 Duration 15  -CS                User Key  (r) = Recorded By, (t) = Taken By, (c) = Cosigned By      Initials Name Provider Type    CS Raj Corral, PT Physical Therapist                     PT OP Goals       Row Name 07/07/25 1100          PT Short Term Goals    STG Date to Achieve 06/13/25  -CS     STG 1 Pt will be independent with initial HEP and postural awareness to improve pain and symptoms.  -CS     STG 1 Progress Met  -CS     STG 2 Pt to be educated in/verbalize understanding of the importance of posture/ergonomics in association with their condition to facilitate self management of their condition.  -CS     STG 2 Progress Ongoing;Progressing  -CS     STG 3 Pt will improve cervical L rotation AROM from 38 degrees to 45 degrees to improve ability to drive safely.  -CS     STG 3 Progress Met  -CS     STG 4 Patient will report 50% reduction in mid thoracic pain leading to greater ease with ADLs, functional tasks and yard work.  -CS     STG 4 Progress Ongoing  -CS        Long Term Goals    LTG Date to Achieve 07/13/25  -CS     LTG 1 Pt will be independent with advance HEP and postural awareness for continued management of pain and symptoms.  -CS      LTG 1 Progress Ongoing  -CS     LTG 2 Pt will improve cervical L rotation AROM from 38 degrees to 50 degrees to improve ability to drive safely.  -CS     LTG 2 Progress Ongoing  -CS     LTG 3 Patient will report 75% reduction in mid thoracic pain leading to greater ease with ADLs, functional tasks and yard work.  -CS     LTG 3 Progress Ongoing  -CS     LTG 4 Patient will report reduction in headache frequency from 1x/wk to </= 0 headaches per week over the last four weekend to indicate reduction in suboccipital muscle tension and improved posture.  -CS     LTG 4 Progress Ongoing  -CS     LTG 5 Patient will report pain at 7/10 at worst compared to 9/10 at worst at initial evaluation to indicate reduction in pain intensity and improved activity tolerance.  -CS     LTG 5 Progress Ongoing;Progressing  -CS               User Key  (r) = Recorded By, (t) = Taken By, (c) = Cosigned By      Initials Name Provider Type    CS Raj Corral, PT Physical Therapist                    Therapy Education  Education Details: Updated HEP, posture/T-spine mobility  Given: Pain management, Posture/body mechanics  Program: Reinforced, New  How Provided: Verbal, Written, Demonstration  Provided to: Patient  Level of Understanding: Teach back education performed, Verbalized              Time Calculation:   Start Time: 1144  Stop Time: 1233  Time Calculation (min): 49 min  Timed Charges  63609 - PT Therapeutic Exercise Minutes: 34  75007 - PT Manual Therapy Minutes: 15  Total Minutes  Timed Charges Total Minutes: 49   Total Minutes: 49  Therapy Charges for Today       Code Description Service Date Service Provider Modifiers Qty    18343649404  PT THER PROC EA 15 MIN 7/7/2025 aRj Corral, PT GP 2    [id number]  PT MANUAL THERAPY EA 15 MIN 7/7/2025 Raj Corral, PT GP 1                      Raj Corral PT  7/7/2025

## 2025-07-08 ENCOUNTER — TELEPHONE (OUTPATIENT)
Dept: PAIN MEDICINE | Facility: CLINIC | Age: 70
End: 2025-07-08
Payer: MEDICARE

## 2025-07-08 NOTE — TELEPHONE ENCOUNTER
SPOKE TO PATIENT, WANTED TO CANCEL PROCEDURE ON 7/10. PATIENT WILL CALL US BACK TO RESCHEDULE PROCEDURE IF NEEDED.

## 2025-07-08 NOTE — TELEPHONE ENCOUNTER
"Caller: Kenneth Jean Baptiste \"MEAGHAN\"    Relationship to patient: Self    Best call back number: [phone]*    Patient is needing: PT IS CALLING TO CANCEL THE APPOINTMENT FOR 07/10/25 .. PLEASE ADVISE..          "

## 2025-07-09 ENCOUNTER — HOSPITAL ENCOUNTER (OUTPATIENT)
Dept: PHYSICAL THERAPY | Facility: HOSPITAL | Age: 70
Setting detail: THERAPIES SERIES
Discharge: HOME | End: 2025-07-09
Payer: MEDICARE

## 2025-07-09 DIAGNOSIS — R29.3 POOR POSTURE: ICD-10-CM

## 2025-07-09 DIAGNOSIS — G89.29 CHRONIC NECK PAIN: Primary | ICD-10-CM

## 2025-07-09 DIAGNOSIS — M54.2 CHRONIC NECK PAIN: Primary | ICD-10-CM

## 2025-07-09 PROCEDURE — 97110 THERAPEUTIC EXERCISES: CPT

## 2025-07-09 NOTE — THERAPY PROGRESS REPORT/RE-CERT
Outpatient Physical Therapy Ortho Progress Note  [facility]     Patient Name: Kenneth Jean Baptiste  : 1955  MRN: [id number]  Today's Date: 2025      Visit Date: 2025    Visit Dx:    ICD-10-CM ICD-9-CM   1. Chronic neck pain  M54.2 723.1    G89.29 338.29   2. Poor posture  R29.3 781.92       Patient Active Problem List   Diagnosis    Degeneration of intervertebral disc of mid-cervical region    Left facial numbness    Migraine without aura and without status migrainosus, not intractable    Graves' disease    Postoperative hypothyroidism    Heart palpitations    Chest pain    Thrombocytopenia    Bilateral leg weakness    Cervical disc disorder at C5-C6 level with radiculopathy    Chronic bilateral thoracic back pain    DDD (degenerative disc disease), lumbar    Chronic neck pain    Neck muscle spasm    Scapular dysfunction    Abnormal MRI, shoulder    Cervical spinal stenosis    Cervical disc disorder at C6-C7 level with radiculopathy    Cervical spondylosis without myelopathy    DDD (degenerative disc disease), cervical        Past Medical History:   Diagnosis Date    Abnormal ECG     ALS (amyotrophic lateral sclerosis)     Anxiety     Heart aretyhmia, sleeplessness    Arthritis     OSTEO    Avascular necrosis     HISTORY OF BOTH LEFT AND RIGHT HIPS    Bilateral inguinal hernia     Cervical disc disorder     Chronic constipation     Chronic neck pain     Colon polyps     HISTORY OF    Diverticulitis     Diverticulosis     Fibromyalgia, primary     GERD (gastroesophageal reflux disease)     Gout     Headache, tension-type     HL (hearing loss)     Hypertension     MED SHORT PERIOD OF TIME. NOTHING RECENTLY    IBS (irritable bowel syndrome)     WITH CONSTIPATION    Insomnia     Loss of sensation     BOWEL.    Low back pain     Lumbosacral disc disease     Memory loss     Migraine     Palpitation     DURING THE NIGHT TIMES    Peripheral neuropathy     GUSTAVO BALLS OF FEET    Personal history of  COVID-19     SEPT 13 2021. [facility]ULT [facility] [place]    PONV (postoperative nausea and vomiting)     Thoracic disc disorder     Thyroid disease     GOITER REMOVED-VASCULAR MASS. BENIGN,PARTIAL THYROIDECTOMY    Umbilical hernia         Past Surgical History:   Procedure Laterality Date    APPENDECTOMY  03/2013    Dr. Granado    BICEPS TENDON REPAIR Left 08/28/2012    COLONOSCOPY N/A 02/2021    EPIDURAL BLOCK      HAND SURGERY Left     FRACTURED HAND    HIP SURGERY Left 1982    NERVE SURGERY,     HIP SURGERY      LEFT AND RIGHT BONFIGLIO BONE GRAFT 11/82 AND 1/83    INGUINAL HERNIA REPAIR Right 2000    Dr. Mckenna    INGUINAL HERNIA REPAIR Bilateral 12/1/2021    Procedure: BILATERAL INGUINAL HERNIA REPAIR LAPAROSCOPIC WITH [employer] ROBOT UMBILICAL HERNIA REPAIR;  Surgeon: Zbigniew Starr MD;  Location: [facility] OR;  Service: Rob[employer] - [employer];  Laterality: Bilateral;    ORIF ULNA/RADIUS FRACTURES Right     WITH HARDWARE. SINCE REMOVED    PARTIAL HIP ARTHROPLASTY Left 1984    THYROIDECTOMY, PARTIAL  06/08/2016    TOTAL HIP ARTHROPLASTY REVISION Left     SWITCHED TO FULL TOTAL        PT Ortho       Row Name 07/09/25 1400       Cervical/Shoulder ROM Screen    Cervical rotation Normal  68 deg L, 65 deg R  -JS              User Key  (r) = Recorded By, (t) = Taken By, (c) = Cosigned By      Initials Name Provider Type    Julee Garcia, PT Physical Therapist                                 PT Assessment/Plan       Row Name 07/09/25 1400          PT Assessment    Functional Limitations Limitation in home management;Limitations in community activities;Limitations in functional capacity and performance;Performance in leisure activities;Performance in self-care ADL  -JS     Impairments Balance;Impaired flexibility;Impaired muscle length;Impaired muscle power;Joint mobility;Pain;Muscle strength;Poor body mechanics;Range of motion;Posture;Peripheral nerve integrity  -     Assessment Comments Kenneth Jean Baptiste  "has been seen for 11 physical therapy sessions for L sided neck/shoulder blade pain and mid thoracic pain that is chronic in nature. Patient reports overall 10-15% improvement since the start of PT. He expresses improvement in cervical ROM, reduction in \"pinching\" sensation within his mid back, and reduction in headache frequency/intensity. He was previously scheduled for ablation procedure but elected to cancel due to risk factors and \"masking\" his symptoms. Treatment has included therapeutic exercise, manual therapy, and patient education with home exercise program. Progress to physical therapy goals is good. Pt has met 3/4 STG and 2/5 LTG and slowly progressing towards remaining goals. Pt continues to demonstrate significant cervical/thoracic facet hypomobility and associated decrease ROM. He demonstrates improvement in L cervical ROM from 38 deg of mobility to 68 deg. He also expresses not experiencing a headache within the last week. He has previously benefited from thoracic P/As leading to reduction tension and pain at end of sessions. We have began progressing away from manual therapy techniques and focusing on muscle strength/stability for long-term management. He will benefit from continued skilled physical therapy to address remaining impairments and functional limitations.  -JS     Please refer to paper survey for additional self-reported information No  -JS     Rehab Potential Fair  -JS     Patient/caregiver participated in establishment of treatment plan and goals Yes  -JS     Patient would benefit from skilled therapy intervention Yes  -JS        PT Plan    PT Frequency 1x/week;2x/week  -JS     Predicted Duration of Therapy Intervention (PT) 4-6 visits  -JS     Planned CPT's? PT RE-EVAL: 71155;PT THER PROC EA 15 MIN: 71002;PT THER ACT EA 15 MIN: 04007;PT MANUAL THERAPY EA 15 MIN: 56799;PT NEUROMUSC RE-EDUCATION EA 15 MIN: 71131;PT SELF CARE/HOME MGMT/TRAIN EA 15: 48009;PT HOT OR COLD PACK TREAT " PIERO;PT ELECTRICAL STIM UNATTEND:   -JS     PT Plan Comments Wall walks w/ TB, prone Is, Ts, and Ys? begin finalizing hEP  -JS               User Key  (r) = Recorded By, (t) = Taken By, (c) = Cosigned By      Initials Name Provider Type    Julee Garcia, PT Physical Therapist                       OP Exercises       Row Name 07/09/25 1400             Subjective    Subjective Comments things seem to be okay today  -JS         Subjective Pain    Able to rate subjective pain? yes  -JS      Pre-Treatment Pain Level 4  -JS      Post-Treatment Pain Level 5  -JS         Total Minutes    41720 - PT Therapeutic Exercise Minutes 41  -JS         Exercise 1    Exercise Name 1 UBE  -JS      Time 1 4 mins  -JS         Exercise 3    Exercise Name 3 door stretch  -JS      Cueing 3 Verbal;Demo  -JS      Sets 3 1  -JS      Reps 3 4  -JS      Time 3 20sec  -JS         Exercise 4    Exercise Name 4 Supine chin tucks w/ HA  -JS      Cueing 4 Verbal;Demo  -JS      Sets 4 2  -JS      Reps 4 10  -JS      Time 4 3s  -JS         Exercise 8    Exercise Name 8 standing star  -JS      Reps 8 10  -JS      Time 8 each  -JS      Additional Comments RTB (isolating one side of diagonals and not alternating)  -JS         Exercise 9    Exercise Name 9 Thread the needle in Qped  -JS      Cueing 9 Verbal;Demo  -JS      Sets 9 1  -JS      Reps 9 15ea  -JS      Time 9 3-5s  -JS         Exercise 10    Exercise Name 10 TRX rows  -JS      Cueing 10 Verbal;Demo  -JS      Sets 10 2  -JS      Reps 10 15  -JS         Exercise 11    Exercise Name 11 TRX Y walkouts  -JS      Cueing 11 Verbal;Demo  -JS      Sets 11 1  -JS      Reps 11 15  -JS      Additional Comments T-spine ext  -JS         Exercise 13    Exercise Name 13 SAPs  -JS      Cueing 13 Verbal;Tactile  -JS      Sets 13 2  -JS      Reps 13 10  -JS      Additional Comments 3# DB  -JS                User Key  (r) = Recorded By, (t) = Taken By, (c) = Cosigned By      Initials Name Provider Type     Julee Garcia, PT Physical Therapist                                  PT OP Goals       Row Name 07/09/25 1400          PT Short Term Goals    STG Date to Achieve 06/13/25  -     STG 1 Pt will be independent with initial HEP and postural awareness to improve pain and symptoms.  -     STG 1 Progress Met  -     STG 2 Pt to be educated in/verbalize understanding of the importance of posture/ergonomics in association with their condition to facilitate self management of their condition.  -     STG 2 Progress Met  -     STG 2 Progress Comments verbalizes understanding  -     STG 3 Pt will improve cervical L rotation AROM from 38 degrees to 45 degrees to improve ability to drive safely.  -     STG 3 Progress Met  -     STG 4 Patient will report 50% reduction in mid thoracic pain leading to greater ease with ADLs, functional tasks and yard work.  -     STG 4 Progress Ongoing  -        Long Term Goals    LTG Date to Achieve 07/13/25  -     LTG 1 Pt will be independent with advance HEP and postural awareness for continued management of pain and symptoms.  -     LTG 1 Progress Ongoing  -     LTG 2 Pt will improve cervical L rotation AROM from 38 degrees to 50 degrees to improve ability to drive safely.  -     LTG 2 Progress Met  -     LTG 2 Progress Comments L 68 deg, R 65 deg  -     LTG 3 Patient will report 75% reduction in mid thoracic pain leading to greater ease with ADLs, functional tasks and yard work.  -     LTG 3 Progress Ongoing  -     LTG 4 Patient will report reduction in headache frequency from 1x/wk to </= 0 headaches per week over the last four weekend to indicate reduction in suboccipital muscle tension and improved posture.  -     LTG 4 Progress Met  -     LTG 4 Progress Comments denies HA in the last week  -     LTG 5 Patient will report pain at 7/10 at worst compared to 9/10 at worst at initial evaluation to indicate reduction in pain intensity and  improved activity tolerance.  -ISA     LTG 5 Progress Ongoing;Progressing  -ISA     LTG 5 Progress Comments 8/10  -ISA               User Key  (r) = Recorded By, (t) = Taken By, (c) = Cosigned By      Initials Name Provider Type    Julee Garcia, PT Physical Therapist                    Therapy Education  Given: HEP, Symptoms/condition management, Pain management, Posture/body mechanics  Program: Reinforced  How Provided: Verbal, Demonstration  Provided to: Patient  Level of Understanding: Teach back education performed, Verbalized, Demonstrated              Time Calculation:   Start Time: 1400  Stop Time: 1441  Time Calculation (min): 41 min  Timed Charges  72921 - PT Therapeutic Exercise Minutes: 41  Total Minutes  Timed Charges Total Minutes: 41   Total Minutes: 41  Therapy Charges for Today       Code Description Service Date Service Provider Modifiers Qty    [id number]  PT THER PROC EA 15 MIN 7/9/2025 Julee Ramírez, PT GP 3                      Julee Ramírez PT  7/9/2025

## 2025-07-14 ENCOUNTER — HOSPITAL ENCOUNTER (OUTPATIENT)
Dept: PHYSICAL THERAPY | Facility: HOSPITAL | Age: 70
Setting detail: THERAPIES SERIES
Discharge: HOME | End: 2025-07-14
Payer: MEDICARE

## 2025-07-14 DIAGNOSIS — G89.29 CHRONIC NECK PAIN: Primary | ICD-10-CM

## 2025-07-14 DIAGNOSIS — R29.3 POOR POSTURE: ICD-10-CM

## 2025-07-14 DIAGNOSIS — M54.2 CHRONIC NECK PAIN: Primary | ICD-10-CM

## 2025-07-14 PROCEDURE — 97110 THERAPEUTIC EXERCISES: CPT

## 2025-07-14 NOTE — THERAPY TREATMENT NOTE
Outpatient Physical Therapy Ortho Treatment Note  [facility]     Patient Name: Kenneth Jean Baptiste  : 1955  MRN: [id number]  Today's Date: 2025      Visit Date: 2025    Visit Dx:    ICD-10-CM ICD-9-CM   1. Chronic neck pain  M54.2 723.1    G89.29 338.29   2. Poor posture  R29.3 781.92       Patient Active Problem List   Diagnosis    Degeneration of intervertebral disc of mid-cervical region    Left facial numbness    Migraine without aura and without status migrainosus, not intractable    Graves' disease    Postoperative hypothyroidism    Heart palpitations    Chest pain    Thrombocytopenia    Bilateral leg weakness    Cervical disc disorder at C5-C6 level with radiculopathy    Chronic bilateral thoracic back pain    DDD (degenerative disc disease), lumbar    Chronic neck pain    Neck muscle spasm    Scapular dysfunction    Abnormal MRI, shoulder    Cervical spinal stenosis    Cervical disc disorder at C6-C7 level with radiculopathy    Cervical spondylosis without myelopathy    DDD (degenerative disc disease), cervical        Past Medical History:   Diagnosis Date    Abnormal ECG     ALS (amyotrophic lateral sclerosis)     Anxiety     Heart aretyhmia, sleeplessness    Arthritis     OSTEO    Avascular necrosis     HISTORY OF BOTH LEFT AND RIGHT HIPS    Bilateral inguinal hernia     Cervical disc disorder     Chronic constipation     Chronic neck pain     Colon polyps     HISTORY OF    Diverticulitis     Diverticulosis     Fibromyalgia, primary     GERD (gastroesophageal reflux disease)     Gout     Headache, tension-type     HL (hearing loss)     Hypertension     MED SHORT PERIOD OF TIME. NOTHING RECENTLY    IBS (irritable bowel syndrome)     WITH CONSTIPATION    Insomnia     Loss of sensation     BOWEL.    Low back pain     Lumbosacral disc disease     Memory loss     Migraine     Palpitation     DURING THE NIGHT TIMES    Peripheral neuropathy     GUSTAVO BALLS OF FEET    Personal history  of COVID-19     SEPT 13 2021. RESULT FROM [facility] [place]    PONV (postoperative nausea and vomiting)     Thoracic disc disorder     Thyroid disease     GOITER REMOVED-VASCULAR MASS. BENIGN,PARTIAL THYROIDECTOMY    Umbilical hernia         Past Surgical History:   Procedure Laterality Date    APPENDECTOMY  03/2013    Dr. Granado    BICEPS TENDON REPAIR Left 08/28/2012    COLONOSCOPY N/A 02/2021    EPIDURAL BLOCK      HAND SURGERY Left     FRACTURED HAND    HIP SURGERY Left 1982    NERVE SURGERY,     HIP SURGERY      LEFT AND RIGHT BONFIGLIO BONE GRAFT 11/82 AND 1/83    INGUINAL HERNIA REPAIR Right 2000    Dr. Mckenna    INGUINAL HERNIA REPAIR Bilateral 12/1/2021    Procedure: BILATERAL INGUINAL HERNIA REPAIR LAPAROSCOPIC WITH [employer] ROBOT UMBILICAL HERNIA REPAIR;  Surgeon: Zbigniew Starr MD;  Location: [facility] OR;  Service: [employer];  Laterality: Bilateral;    ORIF ULNA/RADIUS FRACTURES Right     WITH HARDWARE. SINCE REMOVED    PARTIAL HIP ARTHROPLASTY Left 1984    THYROIDECTOMY, PARTIAL  06/08/2016    TOTAL HIP ARTHROPLASTY REVISION Left     SWITCHED TO FULL TOTAL                        PT Assessment/Plan       Row Name 07/14/25 1100          PT Assessment    Assessment Comments Pt reports after his last session he had severe increase in pain in multiple joints that he relates to a gout flare-up, unsure of the cause of his flare-up and unsure if it was due to PT session. Pt states it took about 3 days to fully recover and only just yesterday had a good day. Pt having a difficult time w/ scapular movement during his TRX rows and showing difficulty coordinating his movements, as he would perform scapular retraction, then elbow flexion then forward trunk lean separately. Added wall walks w/ TB, pt continues w/ stiffness in his B scapulae and showing inability to protract and maintain muscular contraction. Pt tolerating increased strengthening, w/ decreased use of manual for great long term  results, fairly. Having a difficult time w/ any progressions due to increased pain in multiple sites of his body and inability to correct posture despite cueing throughout. Seemed to tolerate prone scapular strengthening until his 2nd set when he began having thoracic spasms. Will continue to strength and assess response.  -CS        PT Plan    PT Plan Comments Pt undecided on continued therapy after his scheduled (would benefit but uncertain of response to increased strengthening), face pulls, lat pulldown  -CS               User Key  (r) = Recorded By, (t) = Taken By, (c) = Cosigned By      Initials Name Provider Type    CS Raj Corral, PT Physical Therapist                       OP Exercises       Row Name 07/14/25 1100             Subjective    Subjective Comments I did really bad after last time I was here  -CS         Subjective Pain    Able to rate subjective pain? yes  -CS      Pre-Treatment Pain Level 2  -CS      Post-Treatment Pain Level 2  -CS         Total Minutes    03156 - PT Therapeutic Exercise Minutes 40  -CS         Exercise 1    Exercise Name 1 UBE  -CS      Time 1 4 mins  -CS         Exercise 5    Exercise Name 5 Wall walks  -CS      Cueing 5 Verbal;Demo  -CS      Reps 5 2 laps  -CS      Time 5 RTB  -CS         Exercise 6    Exercise Name 6 Prone Is,Ts,, and Ys  -CS         Exercise 8    Exercise Name 8 standing star  -CS      Cueing 8 Verbal;Demo  -CS      Sets 8 2  -CS      Reps 8 10  -CS      Time 8 each  -CS      Additional Comments RTB (isos, not alt)  -CS         Exercise 10    Exercise Name 10 TRX rows  -CS      Cueing 10 Verbal;Demo  -CS      Sets 10 2  -CS      Reps 10 15  -CS         Exercise 11    Exercise Name 11 TRX Y walkouts  -CS      Cueing 11 Verbal;Demo  -CS      Sets 11 1  -CS      Reps 11 15  -CS      Additional Comments T-spine ext  -CS         Exercise 13    Exercise Name 13 SAPs  -CS      Cueing 13 Verbal;Tactile  -CS      Sets 13 2  -CS      Reps 13 12  -CS       Additional Comments 3# DB  -CS                User Key  (r) = Recorded By, (t) = Taken By, (c) = Cosigned By      Initials Name Provider Type    Raj Lorenzana, PT Physical Therapist                                  PT OP Goals       Row Name 07/14/25 1100          PT Short Term Goals    STG Date to Achieve 06/13/25  -CS     STG 1 Pt will be independent with initial HEP and postural awareness to improve pain and symptoms.  -CS     STG 1 Progress Met  -CS     STG 2 Pt to be educated in/verbalize understanding of the importance of posture/ergonomics in association with their condition to facilitate self management of their condition.  -CS     STG 2 Progress Met  -CS     STG 3 Pt will improve cervical L rotation AROM from 38 degrees to 45 degrees to improve ability to drive safely.  -CS     STG 3 Progress Met  -CS     STG 4 Patient will report 50% reduction in mid thoracic pain leading to greater ease with ADLs, functional tasks and yard work.  -     STG 4 Progress Ongoing  -CS        Long Term Goals    LTG Date to Achieve 07/13/25  -     LTG 1 Pt will be independent with advance HEP and postural awareness for continued management of pain and symptoms.  -CS     LTG 1 Progress Ongoing  -CS     LTG 2 Pt will improve cervical L rotation AROM from 38 degrees to 50 degrees to improve ability to drive safely.  -CS     LTG 2 Progress Met  -CS     LTG 3 Patient will report 75% reduction in mid thoracic pain leading to greater ease with ADLs, functional tasks and yard work.  -CS     LTG 3 Progress Ongoing  -CS     LTG 4 Patient will report reduction in headache frequency from 1x/wk to </= 0 headaches per week over the last four weekend to indicate reduction in suboccipital muscle tension and improved posture.  -     LTG 4 Progress Met  -CS     LTG 5 Patient will report pain at 7/10 at worst compared to 9/10 at worst at initial evaluation to indicate reduction in pain intensity and improved activity tolerance.  -      LTG 5 Progress Ongoing;Progressing  -CS               User Key  (r) = Recorded By, (t) = Taken By, (c) = Cosigned By      Initials Name Provider Type    CS Raj Corral, PT Physical Therapist                    Therapy Education  Given: HEP, Symptoms/condition management, Posture/body mechanics  Program: Reinforced  How Provided: Verbal, Demonstration  Provided to: Patient  Level of Understanding: Teach back education performed, Verbalized, Demonstrated              Time Calculation:   Start Time: 1147  Stop Time: 1227  Time Calculation (min): 40 min  Timed Charges  51181 - PT Therapeutic Exercise Minutes: 40  Total Minutes  Timed Charges Total Minutes: 40   Total Minutes: 40  Therapy Charges for Today       Code Description Service Date Service Provider Modifiers Qty    26881640257 HC PT THER PROC EA 15 MIN 7/14/2025 Raj Corral, PT GP 3                      Raj Corral, PT  7/14/2025

## 2025-07-16 ENCOUNTER — HOSPITAL ENCOUNTER (OUTPATIENT)
Dept: PHYSICAL THERAPY | Facility: HOSPITAL | Age: 70
Setting detail: THERAPIES SERIES
Discharge: HOME | End: 2025-07-16
Payer: MEDICARE

## 2025-07-16 DIAGNOSIS — M54.2 CHRONIC NECK PAIN: Primary | ICD-10-CM

## 2025-07-16 DIAGNOSIS — R29.3 POOR POSTURE: ICD-10-CM

## 2025-07-16 DIAGNOSIS — G89.29 CHRONIC NECK PAIN: Primary | ICD-10-CM

## 2025-07-16 PROCEDURE — 97110 THERAPEUTIC EXERCISES: CPT

## 2025-07-16 NOTE — THERAPY DISCHARGE NOTE
Outpatient Physical Therapy Ortho Treatment Note/Discharge Summary  [facility]     Patient Name: Kenneth Jean Baptiste  : 1955  MRN: [id number]  Today's Date: 2025      Visit Date: 2025    Visit Dx:    ICD-10-CM ICD-9-CM   1. Chronic neck pain  M54.2 723.1    G89.29 338.29   2. Poor posture  R29.3 781.92       Patient Active Problem List   Diagnosis    Degeneration of intervertebral disc of mid-cervical region    Left facial numbness    Migraine without aura and without status migrainosus, not intractable    Graves' disease    Postoperative hypothyroidism    Heart palpitations    Chest pain    Thrombocytopenia    Bilateral leg weakness    Cervical disc disorder at C5-C6 level with radiculopathy    Chronic bilateral thoracic back pain    DDD (degenerative disc disease), lumbar    Chronic neck pain    Neck muscle spasm    Scapular dysfunction    Abnormal MRI, shoulder    Cervical spinal stenosis    Cervical disc disorder at C6-C7 level with radiculopathy    Cervical spondylosis without myelopathy    DDD (degenerative disc disease), cervical        Past Medical History:   Diagnosis Date    Abnormal ECG     ALS (amyotrophic lateral sclerosis)     Anxiety     Heart aretyhmia, sleeplessness    Arthritis     OSTEO    Avascular necrosis     HISTORY OF BOTH LEFT AND RIGHT HIPS    Bilateral inguinal hernia     Cervical disc disorder     Chronic constipation     Chronic neck pain     Colon polyps     HISTORY OF    Diverticulitis     Diverticulosis     Fibromyalgia, primary     GERD (gastroesophageal reflux disease)     Gout     Headache, tension-type     HL (hearing loss)     Hypertension     MED SHORT PERIOD OF TIME. NOTHING RECENTLY    IBS (irritable bowel syndrome)     WITH CONSTIPATION    Insomnia     Loss of sensation     BOWEL.    Low back pain     Lumbosacral disc disease     Memory loss     Migraine     Palpitation     DURING THE NIGHT TIMES    Peripheral neuropathy     GUSTAVO BALLS OF FEET  "   Personal history of COVID-19     SEPT 13 2021. RESULT FROM [facility] [place]    PONV (postoperative nausea and vomiting)     Thoracic disc disorder     Thyroid disease     GOITER REMOVED-VASCULAR MASS. BENIGN,PARTIAL THYROIDECTOMY    Umbilical hernia         Past Surgical History:   Procedure Laterality Date    APPENDECTOMY  03/2013    Dr. Granado    BICEPS TENDON REPAIR Left 08/28/2012    COLONOSCOPY N/A 02/2021    EPIDURAL BLOCK      HAND SURGERY Left     FRACTURED HAND    HIP SURGERY Left 1982    NERVE SURGERY,     HIP SURGERY      LEFT AND RIGHT BONFIGLIO BONE GRAFT 11/82 AND 1/83    INGUINAL HERNIA REPAIR Right 2000    Dr. Mckenna    INGUINAL HERNIA REPAIR Bilateral 12/1/2021    Procedure: BILATERAL INGUINAL HERNIA REPAIR LAPAROSCOPIC WITH [employer] ROBOT UMBILICAL HERNIA REPAIR;  Surgeon: Zbigniew Starr MD;  Location: [facility];  Service: Robotics - [employer];  Laterality: Bilateral;    ORIF ULNA/RADIUS FRACTURES Right     WITH HARDWARE. SINCE REMOVED    PARTIAL HIP ARTHROPLASTY Left 1984    THYROIDECTOMY, PARTIAL  06/08/2016    TOTAL HIP ARTHROPLASTY REVISION Left     SWITCHED TO FULL TOTAL        PT Ortho       Row Name 07/16/25 1200       Vital Signs    Pre Systolic BP Rehab 135  -JS    Pre Treatment Diastolic BP 77  -JS    Pretreatment Heart Rate (beats/min) 59  -JS              User Key  (r) = Recorded By, (t) = Taken By, (c) = Cosigned By      Initials Name Provider Type    Julee Garcia, PT Physical Therapist                                 PT Assessment/Plan       Row Name 07/16/25 1200          PT Assessment    Assessment Comments Kenneth Jean Baptiste has been seen for 11 physical therapy sessions for L sided neck/shoulder blade pain and mid thoracic pain that is chronic in nature. Patient reports overall 10-15% improvement since the start of PT. He expresses improvement in cervical ROM, thoracic mobility, and reduction in \"pinching\" sensation within his mid back. Treatment has included " therapeutic exercise, manual therapy, and patient education with home exercise program. Progress to physical therapy goals is fair/good. Pt has met 3/4 STG and 3/5 LTG. Pt continues to demonstrate significant cervical/thoracic facet hypomobility and associated decrease ROM. Time spent discussing advanced HEP and appropriate progressions/modifications to make based on response following discharge and optimal frequency/duration to complete HEP over next several weeks-months. Patient verbalized understanding. He was discharged to an independent HEP and provided patient education to self-manage condition.  -JS        PT Plan    PT Plan Comments discharged  -JS               User Key  (r) = Recorded By, (t) = Taken By, (c) = Cosigned By      Initials Name Provider Type    Julee Garcia PT Physical Therapist                         OP Exercises       Row Name 07/16/25 1200             Subjective    Subjective Comments I am feeling kind of off today but not bad. I think I can cancel the rest of my appts and work on my HEP at home  -JS         Subjective Pain    Able to rate subjective pain? yes  -JS      Pre-Treatment Pain Level 3  -JS      Post-Treatment Pain Level 3  -JS         Total Minutes    04958 - PT Therapeutic Exercise Minutes 29  -JS         Exercise 1    Exercise Name 1 UBE  -JS      Time 1 4 mins  -JS         Exercise 2    Exercise Name 2 Time spent assessing vitals, discussing goals/POC, advanced HEP and appropriate progressions/modifications to make to HEP pending patient response following discharge  -JS      Time 2 25 mins  -JS                User Key  (r) = Recorded By, (t) = Taken By, (c) = Cosigned By      Initials Name Provider Type    Julee Garcia, PT Physical Therapist                                    PT OP Goals       Row Name 07/16/25 1200          PT Short Term Goals    STG Date to Achieve 06/13/25  -JS     STG 1 Pt will be independent with initial HEP and postural awareness to  improve pain and symptoms.  -JS     STG 1 Progress Met  -JS     STG 2 Pt to be educated in/verbalize understanding of the importance of posture/ergonomics in association with their condition to facilitate self management of their condition.  -JS     STG 2 Progress Met  -JS     STG 3 Pt will improve cervical L rotation AROM from 38 degrees to 45 degrees to improve ability to drive safely.  -JS     STG 3 Progress Met  -JS     STG 4 Patient will report 50% reduction in mid thoracic pain leading to greater ease with ADLs, functional tasks and yard work.  -JS     STG 4 Progress Not Met  -JS     STG 4 Progress Comments 10-15%  -        Long Term Goals    LTG Date to Achieve 07/13/25  -JS     LTG 1 Pt will be independent with advance HEP and postural awareness for continued management of pain and symptoms.  -JS     LTG 1 Progress Met  -JS     LTG 2 Pt will improve cervical L rotation AROM from 38 degrees to 50 degrees to improve ability to drive safely.  -JS     LTG 2 Progress Met  -JS     LTG 3 Patient will report 75% reduction in mid thoracic pain leading to greater ease with ADLs, functional tasks and yard work.  -JS     LTG 3 Progress Not Met  -JS     LTG 3 Progress Comments 10-15%  -JS     LTG 4 Patient will report reduction in headache frequency from 1x/wk to </= 0 headaches per week over the last four weekend to indicate reduction in suboccipital muscle tension and improved posture.  -JS     LTG 4 Progress Met  -JS     LTG 5 Patient will report pain at 7/10 at worst compared to 9/10 at worst at initial evaluation to indicate reduction in pain intensity and improved activity tolerance.  -     LTG 5 Progress Not Met  -JS     LTG 5 Progress Comments 9/10  -               User Key  (r) = Recorded By, (t) = Taken By, (c) = Cosigned By      Initials Name Provider Type    Julee Garcia, PT Physical Therapist                    Therapy Education  Education Details: finalized HEP, discussed various  progression/modifications based on response following discharge  Given: HEP, Symptoms/condition management, Pain management, Posture/body mechanics  Program: Reinforced, Progressed  How Provided: Verbal, Demonstration, Written  Provided to: Patient  Level of Understanding: Verbalized, Demonstrated              Time Calculation:   Start Time: 1215  Stop Time: 1244  Time Calculation (min): 29 min  Timed Charges  89010 - PT Therapeutic Exercise Minutes: 29  Total Minutes  Timed Charges Total Minutes: 29   Total Minutes: 29  Therapy Charges for Today       Code Description Service Date Service Provider Modifiers Qty    84976188160  PT THER PROC EA 15 MIN 7/16/2025 Julee Ramírez, PT GP 2                         Julee Ramírez, PT  7/16/2025

## 2025-07-17 ENCOUNTER — TELEPHONE (OUTPATIENT)
Dept: NEUROLOGY | Facility: CLINIC | Age: 70
End: 2025-07-17
Payer: MEDICARE

## 2025-07-17 NOTE — TELEPHONE ENCOUNTER
Spoke with pt regarding appt time change due to Dr. Gamez' template changing. Appt time changed from 1:00 PM to 1:20 PM with pt's consent.    Will send mail reminder with updated time.

## 2025-07-22 ENCOUNTER — APPOINTMENT (OUTPATIENT)
Dept: PHYSICAL THERAPY | Facility: HOSPITAL | Age: 70
End: 2025-07-22
Payer: MEDICARE

## 2025-07-24 ENCOUNTER — TELEPHONE (OUTPATIENT)
Dept: NEUROLOGY | Facility: CLINIC | Age: 70
End: 2025-07-24
Payer: MEDICARE

## 2025-07-24 NOTE — TELEPHONE ENCOUNTER
Pt came in today stating that he had an appt with  Dr Gamez.  I advised that his appt is on 8/8/2025 at 1:20.  Pt stated that Dr Ramírez's office said they were moving the appt with Dr Ramírez to 7/24/25 @ 1:20 with Dr Gamez. Advised  pt that he never had an appt with Dr Gaemz for today.  I looked and there was no available appt to move him to today.  After further investigation on his my chart,  pt stated that Dr Ramírez's office discharged him stating that he no longer needed to be seen there.   He then walked toward the elevator to exit.  Before getting on the elevator, he stated that Dr Gamez' office should have called him to let him know that he didn't have an appt until 8/8/25.  Advised pt that this office would not have known that he was being discharged from another provider office.

## 2025-07-31 ENCOUNTER — APPOINTMENT (OUTPATIENT)
Dept: PHYSICAL THERAPY | Facility: HOSPITAL | Age: 70
End: 2025-07-31
Payer: MEDICARE

## 2025-08-20 ENCOUNTER — OFFICE VISIT (OUTPATIENT)
Dept: NEUROLOGY | Facility: CLINIC | Age: 70
End: 2025-08-20
Payer: MEDICARE

## 2025-08-20 VITALS
SYSTOLIC BLOOD PRESSURE: 112 MMHG | HEIGHT: 68 IN | WEIGHT: 150.5 LBS | DIASTOLIC BLOOD PRESSURE: 68 MMHG | HEART RATE: 60 BPM | BODY MASS INDEX: 22.81 KG/M2 | OXYGEN SATURATION: 98 %

## 2025-08-20 DIAGNOSIS — R41.89 COGNITIVE CHANGES: Primary | ICD-10-CM

## 2025-08-20 DIAGNOSIS — R52 GENERALIZED BODY ACHES: ICD-10-CM

## 2025-08-20 DIAGNOSIS — M62.89 ABNORMAL INCREASED MUSCLE TIGHTNESS: ICD-10-CM

## 2025-08-20 RX ORDER — CREATINE 100 %
POWDER (GRAM) MISCELLANEOUS
COMMUNITY

## (undated) DEVICE — ANTIBACTERIAL UNDYED BRAIDED (POLYGLACTIN 910), SYNTHETIC ABSORBABLE SUTURE: Brand: COATED VICRYL

## (undated) DEVICE — SUT VIC 0 TIES 18IN J912G

## (undated) DEVICE — LOU GENERAL ROBOT: Brand: MEDLINE INDUSTRIES, INC.

## (undated) DEVICE — COLUMN DRAPE

## (undated) DEVICE — ARM DRAPE

## (undated) DEVICE — BLADELESS OBTURATOR: Brand: WECK VISTA

## (undated) DEVICE — SOL ANTISTICK CAUTRY ELECTROLUBE LF

## (undated) DEVICE — ADHS SKIN SURG TISS VISC PREMIERPRO EXOFIN HI/VISC FAST/DRY

## (undated) DEVICE — TROCAR SITE CLOSURE DEVICE: Brand: ENDO CLOSE

## (undated) DEVICE — TIP COVER ACCESSORY

## (undated) DEVICE — COVER,MAYO STAND,STERILE: Brand: MEDLINE

## (undated) DEVICE — SUT MNCRYL PLS ANTIB UD 4/0 PS2 18IN

## (undated) DEVICE — CANNULA SEAL

## (undated) DEVICE — GLV SURG BIOGEL LTX PF 7 1/2

## (undated) DEVICE — STPLR SKIN VISISTAT WD 35CT

## (undated) DEVICE — LAPAROSCOPIC SMOKE FILTRATION SYSTEM: Brand: PALL LAPAROSHIELD® PLUS LAPAROSCOPIC SMOKE FILTRATION SYSTEM